# Patient Record
Sex: FEMALE | Race: WHITE | Employment: OTHER
[De-identification: names, ages, dates, MRNs, and addresses within clinical notes are randomized per-mention and may not be internally consistent; named-entity substitution may affect disease eponyms.]

---

## 2017-05-16 ENCOUNTER — TELEPHONE (OUTPATIENT)
Dept: OTHER | Facility: CLINIC | Age: 82
End: 2017-05-16

## 2020-01-01 ENCOUNTER — HOSPITAL ENCOUNTER (OUTPATIENT)
Age: 85
Discharge: HOME | End: 2020-07-18
Payer: MEDICARE

## 2020-01-01 ENCOUNTER — HOSPITAL ENCOUNTER (OUTPATIENT)
Age: 85
Discharge: HOME | End: 2020-09-16

## 2020-01-01 ENCOUNTER — HOSPITAL ENCOUNTER (OUTPATIENT)
Age: 85
Discharge: HOME | End: 2020-10-29
Payer: MEDICARE

## 2020-01-01 ENCOUNTER — APPOINTMENT (OUTPATIENT)
Dept: GENERAL RADIOLOGY | Age: 85
DRG: 872 | End: 2020-01-01
Payer: MEDICARE

## 2020-01-01 ENCOUNTER — HOSPITAL ENCOUNTER (OUTPATIENT)
Age: 85
Discharge: HOME | End: 2020-10-18
Payer: MEDICARE

## 2020-01-01 ENCOUNTER — HOSPITAL ENCOUNTER (OUTPATIENT)
Age: 85
Discharge: HOME | End: 2020-07-16
Payer: MEDICARE

## 2020-01-01 ENCOUNTER — HOSPITAL ENCOUNTER (OUTPATIENT)
Age: 85
Discharge: HOME | End: 2020-10-08
Payer: MEDICARE

## 2020-01-01 ENCOUNTER — HOSPITAL ENCOUNTER (INPATIENT)
Age: 85
LOS: 10 days | Discharge: OTHER INSTITUTION | DRG: 641 | End: 2020-08-28
Attending: EMERGENCY MEDICINE | Admitting: INTERNAL MEDICINE
Payer: MEDICARE

## 2020-01-01 ENCOUNTER — HOSPITAL ENCOUNTER (OUTPATIENT)
Age: 85
Discharge: HOME | End: 2020-09-06

## 2020-01-01 ENCOUNTER — HOSPITAL ENCOUNTER (OUTPATIENT)
Age: 85
Discharge: HOME | End: 2020-10-04
Payer: MEDICARE

## 2020-01-01 ENCOUNTER — HOSPITAL ENCOUNTER (OUTPATIENT)
Age: 85
Discharge: HOME | End: 2020-09-10

## 2020-01-01 ENCOUNTER — HOSPITAL ENCOUNTER (OUTPATIENT)
Age: 85
Discharge: HOME | End: 2020-08-13
Payer: MEDICARE

## 2020-01-01 ENCOUNTER — APPOINTMENT (OUTPATIENT)
Dept: CT IMAGING | Age: 85
DRG: 872 | End: 2020-01-01
Payer: MEDICARE

## 2020-01-01 ENCOUNTER — HOSPITAL ENCOUNTER (OUTPATIENT)
Age: 85
Discharge: HOME | End: 2020-11-01
Payer: MEDICARE

## 2020-01-01 ENCOUNTER — HOSPITAL ENCOUNTER (INPATIENT)
Age: 85
LOS: 3 days | Discharge: NURSING, REHAB OR LONG-TERM CARE | DRG: 872 | End: 2020-12-31
Attending: EMERGENCY MEDICINE | Admitting: INTERNAL MEDICINE
Payer: MEDICARE

## 2020-01-01 ENCOUNTER — HOSPITAL ENCOUNTER (OUTPATIENT)
Age: 85
Discharge: HOME | End: 2020-10-11
Payer: MEDICARE

## 2020-01-01 ENCOUNTER — HOSPITAL ENCOUNTER (OUTPATIENT)
Age: 85
Discharge: HOME | End: 2020-09-25
Payer: MEDICARE

## 2020-01-01 ENCOUNTER — ANESTHESIA EVENT (OUTPATIENT)
Dept: ENDOSCOPY | Age: 85
DRG: 641 | End: 2020-01-01
Payer: MEDICARE

## 2020-01-01 ENCOUNTER — APPOINTMENT (OUTPATIENT)
Dept: CT IMAGING | Age: 85
DRG: 641 | End: 2020-01-01
Payer: MEDICARE

## 2020-01-01 ENCOUNTER — HOSPITAL ENCOUNTER (OUTPATIENT)
Age: 85
Discharge: HOME | End: 2020-08-20
Payer: MEDICARE

## 2020-01-01 ENCOUNTER — ANESTHESIA (OUTPATIENT)
Dept: ENDOSCOPY | Age: 85
DRG: 641 | End: 2020-01-01
Payer: MEDICARE

## 2020-01-01 ENCOUNTER — HOSPITAL ENCOUNTER (OUTPATIENT)
Age: 85
Discharge: HOME | End: 2020-09-03

## 2020-01-01 VITALS
OXYGEN SATURATION: 99 % | RESPIRATION RATE: 5 BRPM | DIASTOLIC BLOOD PRESSURE: 52 MMHG | SYSTOLIC BLOOD PRESSURE: 89 MMHG

## 2020-01-01 VITALS
HEIGHT: 63 IN | WEIGHT: 172 LBS | RESPIRATION RATE: 18 BRPM | TEMPERATURE: 97.5 F | BODY MASS INDEX: 30.48 KG/M2 | SYSTOLIC BLOOD PRESSURE: 110 MMHG | OXYGEN SATURATION: 94 % | DIASTOLIC BLOOD PRESSURE: 56 MMHG | HEART RATE: 95 BPM

## 2020-01-01 VITALS
OXYGEN SATURATION: 95 % | RESPIRATION RATE: 16 BRPM | BODY MASS INDEX: 30.65 KG/M2 | SYSTOLIC BLOOD PRESSURE: 110 MMHG | DIASTOLIC BLOOD PRESSURE: 60 MMHG | WEIGHT: 173 LBS | HEIGHT: 63 IN | HEART RATE: 76 BPM | TEMPERATURE: 98.6 F

## 2020-01-01 DIAGNOSIS — R41.82 ALTERED MENTAL STATUS, UNSPECIFIED ALTERED MENTAL STATUS TYPE: ICD-10-CM

## 2020-01-01 DIAGNOSIS — N30.00 ACUTE CYSTITIS WITHOUT HEMATURIA: Primary | ICD-10-CM

## 2020-01-01 LAB
ALBUMIN SERPL-MCNC: 2.6 G/DL (ref 3.5–5.2)
ALBUMIN SERPL-MCNC: 2.7 G/DL (ref 3.5–5.2)
ALBUMIN SERPL-MCNC: 2.7 G/DL (ref 3.5–5.2)
ALBUMIN SERPL-MCNC: 2.8 G/DL (ref 3.5–5.2)
ALBUMIN SERPL-MCNC: 2.9 G/DL (ref 3.5–5.2)
ALBUMIN SERPL-MCNC: 2.9 G/DL (ref 3.5–5.2)
ALBUMIN SERPL-MCNC: 3 G/DL (ref 3.5–5.2)
ALBUMIN SERPL-MCNC: 3 G/DL (ref 3.5–5.2)
ALBUMIN SERPL-MCNC: 3.1 G/DL (ref 3.5–5.2)
ALBUMIN SERPL-MCNC: 3.3 G/DL (ref 3.5–5.2)
ALBUMIN SERPL-MCNC: 3.5 G/DL (ref 3.5–5.2)
ALBUMIN SERPL-MCNC: 3.8 G/DL (ref 3.5–5.2)
ALBUMIN SERPL-MCNC: 3.9 G/DL (ref 3.5–5.2)
ALBUMIN SERPL-MCNC: 4 G/DL (ref 3.5–5.2)
ALP BLD-CCNC: 100 U/L (ref 35–104)
ALP BLD-CCNC: 101 U/L (ref 35–104)
ALP BLD-CCNC: 101 U/L (ref 35–104)
ALP BLD-CCNC: 102 U/L (ref 35–104)
ALP BLD-CCNC: 106 U/L (ref 35–104)
ALP BLD-CCNC: 111 U/L (ref 35–104)
ALP BLD-CCNC: 87 U/L (ref 35–104)
ALP BLD-CCNC: 91 U/L (ref 35–104)
ALP BLD-CCNC: 91 U/L (ref 35–104)
ALP BLD-CCNC: 92 U/L (ref 35–104)
ALP BLD-CCNC: 92 U/L (ref 35–104)
ALP BLD-CCNC: 93 U/L (ref 35–104)
ALP BLD-CCNC: 98 U/L (ref 35–104)
ALP BLD-CCNC: 98 U/L (ref 35–104)
ALT SERPL-CCNC: 10 U/L (ref 0–32)
ALT SERPL-CCNC: 11 U/L (ref 0–32)
ALT SERPL-CCNC: 12 U/L (ref 0–32)
ALT SERPL-CCNC: 12 U/L (ref 0–32)
ALT SERPL-CCNC: 13 U/L (ref 0–32)
ALT SERPL-CCNC: 14 U/L (ref 0–32)
ALT SERPL-CCNC: 14 U/L (ref 0–32)
ALT SERPL-CCNC: 16 U/L (ref 0–32)
ALT SERPL-CCNC: 16 U/L (ref 0–32)
ALT SERPL-CCNC: 18 U/L (ref 0–32)
ALT SERPL-CCNC: 19 U/L (ref 0–32)
ALT SERPL-CCNC: 37 U/L (ref 0–32)
ANION GAP SERPL CALCULATED.3IONS-SCNC: 10 MMOL/L (ref 7–16)
ANION GAP SERPL CALCULATED.3IONS-SCNC: 12 MMOL/L (ref 7–16)
ANION GAP SERPL CALCULATED.3IONS-SCNC: 13 MMOL/L (ref 7–16)
ANION GAP SERPL CALCULATED.3IONS-SCNC: 14 MMOL/L (ref 7–16)
ANION GAP SERPL CALCULATED.3IONS-SCNC: 14 MMOL/L (ref 7–16)
ANION GAP SERPL CALCULATED.3IONS-SCNC: 15 MMOL/L (ref 7–16)
ANION GAP SERPL CALCULATED.3IONS-SCNC: 16 MMOL/L (ref 7–16)
ANION GAP SERPL CALCULATED.3IONS-SCNC: 17 MMOL/L (ref 7–16)
ANION GAP SERPL CALCULATED.3IONS-SCNC: 18 MMOL/L (ref 7–16)
ANION GAP SERPL CALCULATED.3IONS-SCNC: 20 MMOL/L (ref 7–16)
ANION GAP SERPL CALCULATED.3IONS-SCNC: 20 MMOL/L (ref 7–16)
ANION GAP SERPL CALCULATED.3IONS-SCNC: 21 MMOL/L (ref 7–16)
ANION GAP SERPL CALCULATED.3IONS-SCNC: 22 MMOL/L (ref 7–16)
ANION GAP SERPL CALCULATED.3IONS-SCNC: 24 MMOL/L (ref 7–16)
ANION GAP SERPL CALCULATED.3IONS-SCNC: 24 MMOL/L (ref 7–16)
ANISOCYTOSIS: ABNORMAL
AST SERPL-CCNC: 15 U/L (ref 0–31)
AST SERPL-CCNC: 16 U/L (ref 0–31)
AST SERPL-CCNC: 17 U/L (ref 0–31)
AST SERPL-CCNC: 19 U/L (ref 0–31)
AST SERPL-CCNC: 19 U/L (ref 0–31)
AST SERPL-CCNC: 22 U/L (ref 0–31)
AST SERPL-CCNC: 26 U/L (ref 0–31)
AST SERPL-CCNC: 27 U/L (ref 0–31)
AST SERPL-CCNC: 27 U/L (ref 0–31)
AST SERPL-CCNC: 29 U/L (ref 0–31)
AST SERPL-CCNC: 30 U/L (ref 0–31)
AST SERPL-CCNC: 31 U/L (ref 0–31)
AST SERPL-CCNC: 36 U/L (ref 0–31)
AST SERPL-CCNC: 37 U/L (ref 0–31)
B.E.: -7.8 MMOL/L (ref -3–3)
BACTERIA: ABNORMAL /HPF
BASOPHILS ABSOLUTE: 0 E9/L (ref 0–0.2)
BASOPHILS ABSOLUTE: 0.02 E9/L (ref 0–0.2)
BASOPHILS ABSOLUTE: 0.03 E9/L (ref 0–0.2)
BASOPHILS ABSOLUTE: 0.04 E9/L (ref 0–0.2)
BASOPHILS ABSOLUTE: 0.05 E9/L (ref 0–0.2)
BASOPHILS RELATIVE PERCENT: 0.2 % (ref 0–2)
BASOPHILS RELATIVE PERCENT: 0.2 % (ref 0–2)
BASOPHILS RELATIVE PERCENT: 0.3 % (ref 0–2)
BASOPHILS RELATIVE PERCENT: 0.3 % (ref 0–2)
BASOPHILS RELATIVE PERCENT: 0.4 % (ref 0–2)
BASOPHILS RELATIVE PERCENT: 0.5 % (ref 0–2)
BASOPHILS RELATIVE PERCENT: 0.6 % (ref 0–2)
BASOPHILS RELATIVE PERCENT: 0.6 % (ref 0–2)
BASOPHILS RELATIVE PERCENT: 0.7 % (ref 0–2)
BASOPHILS RELATIVE PERCENT: 0.7 % (ref 0–2)
BASOPHILS RELATIVE PERCENT: 0.8 % (ref 0–2)
BASOPHILS RELATIVE PERCENT: 0.8 % (ref 0–2)
BILIRUB SERPL-MCNC: 0.3 MG/DL (ref 0–1.2)
BILIRUB SERPL-MCNC: 0.3 MG/DL (ref 0–1.2)
BILIRUB SERPL-MCNC: 0.4 MG/DL (ref 0–1.2)
BILIRUB SERPL-MCNC: 0.5 MG/DL (ref 0–1.2)
BILIRUB SERPL-MCNC: 0.6 MG/DL (ref 0–1.2)
BILIRUBIN DIRECT: 0.2 MG/DL (ref 0–0.3)
BILIRUBIN DIRECT: <0.2 MG/DL (ref 0–0.3)
BILIRUBIN URINE: NEGATIVE
BILIRUBIN, INDIRECT: 0.3 MG/DL (ref 0–1)
BILIRUBIN, INDIRECT: ABNORMAL MG/DL (ref 0–1)
BLOOD CULTURE, ROUTINE: NORMAL
BLOOD, URINE: ABNORMAL
BLOOD, URINE: ABNORMAL
BLOOD, URINE: NEGATIVE
BLOOD, URINE: NEGATIVE
BUN BLDV-MCNC: 102 MG/DL (ref 8–23)
BUN BLDV-MCNC: 102 MG/DL (ref 8–23)
BUN BLDV-MCNC: 118 MG/DL (ref 8–23)
BUN BLDV-MCNC: 125 MG/DL (ref 8–23)
BUN BLDV-MCNC: 129 MG/DL (ref 8–23)
BUN BLDV-MCNC: 130 MG/DL (ref 8–23)
BUN BLDV-MCNC: 132 MG/DL (ref 8–23)
BUN BLDV-MCNC: 19 MG/DL (ref 8–23)
BUN BLDV-MCNC: 31 MG/DL (ref 8–23)
BUN BLDV-MCNC: 37 MG/DL (ref 8–23)
BUN BLDV-MCNC: 40 MG/DL (ref 8–23)
BUN BLDV-MCNC: 40 MG/DL (ref 8–23)
BUN BLDV-MCNC: 45 MG/DL (ref 8–23)
BUN BLDV-MCNC: 51 MG/DL (ref 8–23)
BUN BLDV-MCNC: 51 MG/DL (ref 8–23)
BUN BLDV-MCNC: 52 MG/DL (ref 8–23)
BUN BLDV-MCNC: 58 MG/DL (ref 8–23)
BUN BLDV-MCNC: 63 MG/DL (ref 8–23)
BUN BLDV-MCNC: 66 MG/DL (ref 8–23)
BUN BLDV-MCNC: 69 MG/DL (ref 8–23)
BUN BLDV-MCNC: 73 MG/DL (ref 8–23)
BUN BLDV-MCNC: 78 MG/DL (ref 8–23)
BUN BLDV-MCNC: 88 MG/DL (ref 8–23)
BUN BLDV-MCNC: 92 MG/DL (ref 8–23)
C-REACTIVE PROTEIN: 1.2 MG/DL (ref 0–0.4)
C-REACTIVE PROTEIN: 1.3 MG/DL (ref 0–0.4)
C-REACTIVE PROTEIN: 1.4 MG/DL (ref 0–0.4)
C-REACTIVE PROTEIN: 1.7 MG/DL (ref 0–0.4)
C-REACTIVE PROTEIN: 1.7 MG/DL (ref 0–0.4)
C-REACTIVE PROTEIN: 4.5 MG/DL (ref 0–0.4)
C-REACTIVE PROTEIN: 5 MG/DL (ref 0–0.4)
C-REACTIVE PROTEIN: 6 MG/DL (ref 0–0.4)
CALCIUM SERPL-MCNC: 10.1 MG/DL (ref 8.6–10.2)
CALCIUM SERPL-MCNC: 10.2 MG/DL (ref 8.6–10.2)
CALCIUM SERPL-MCNC: 10.5 MG/DL (ref 8.6–10.2)
CALCIUM SERPL-MCNC: 10.8 MG/DL (ref 8.6–10.2)
CALCIUM SERPL-MCNC: 10.8 MG/DL (ref 8.6–10.2)
CALCIUM SERPL-MCNC: 11 MG/DL (ref 8.6–10.2)
CALCIUM SERPL-MCNC: 8.2 MG/DL (ref 8.6–10.2)
CALCIUM SERPL-MCNC: 8.3 MG/DL (ref 8.6–10.2)
CALCIUM SERPL-MCNC: 8.3 MG/DL (ref 8.6–10.2)
CALCIUM SERPL-MCNC: 8.4 MG/DL (ref 8.6–10.2)
CALCIUM SERPL-MCNC: 8.5 MG/DL (ref 8.6–10.2)
CALCIUM SERPL-MCNC: 8.5 MG/DL (ref 8.6–10.2)
CALCIUM SERPL-MCNC: 8.6 MG/DL (ref 8.6–10.2)
CALCIUM SERPL-MCNC: 8.6 MG/DL (ref 8.6–10.2)
CALCIUM SERPL-MCNC: 8.7 MG/DL (ref 8.6–10.2)
CALCIUM SERPL-MCNC: 8.9 MG/DL (ref 8.6–10.2)
CALCIUM SERPL-MCNC: 9.3 MG/DL (ref 8.6–10.2)
CALCIUM SERPL-MCNC: 9.4 MG/DL (ref 8.6–10.2)
CALCIUM SERPL-MCNC: 9.5 MG/DL (ref 8.6–10.2)
CALCIUM SERPL-MCNC: 9.8 MG/DL (ref 8.6–10.2)
CALCIUM SERPL-MCNC: 9.9 MG/DL (ref 8.6–10.2)
CALCIUM SERPL-MCNC: 9.9 MG/DL (ref 8.6–10.2)
CHLORIDE BLD-SCNC: 100 MMOL/L (ref 98–107)
CHLORIDE BLD-SCNC: 101 MMOL/L (ref 98–107)
CHLORIDE BLD-SCNC: 102 MMOL/L (ref 98–107)
CHLORIDE BLD-SCNC: 103 MMOL/L (ref 98–107)
CHLORIDE BLD-SCNC: 103 MMOL/L (ref 98–107)
CHLORIDE BLD-SCNC: 104 MMOL/L (ref 98–107)
CHLORIDE BLD-SCNC: 105 MMOL/L (ref 98–107)
CHLORIDE BLD-SCNC: 106 MMOL/L (ref 98–107)
CHLORIDE BLD-SCNC: 106 MMOL/L (ref 98–107)
CHLORIDE BLD-SCNC: 110 MMOL/L (ref 98–107)
CHLORIDE BLD-SCNC: 86 MMOL/L (ref 98–107)
CHLORIDE BLD-SCNC: 88 MMOL/L (ref 98–107)
CHLORIDE BLD-SCNC: 89 MMOL/L (ref 98–107)
CHLORIDE BLD-SCNC: 89 MMOL/L (ref 98–107)
CHLORIDE BLD-SCNC: 90 MMOL/L (ref 98–107)
CHLORIDE BLD-SCNC: 94 MMOL/L (ref 98–107)
CHLORIDE BLD-SCNC: 95 MMOL/L (ref 98–107)
CHLORIDE BLD-SCNC: 97 MMOL/L (ref 98–107)
CHLORIDE BLD-SCNC: 97 MMOL/L (ref 98–107)
CHLORIDE BLD-SCNC: 98 MMOL/L (ref 98–107)
CHLORIDE BLD-SCNC: 98 MMOL/L (ref 98–107)
CHLORIDE BLD-SCNC: 99 MMOL/L (ref 98–107)
CHLORIDE URINE RANDOM: 55 MMOL/L
CHOLESTEROL, TOTAL: 186 MG/DL (ref 0–199)
CLARITY: ABNORMAL
CLARITY: CLEAR
CO2: 14 MMOL/L (ref 22–29)
CO2: 15 MMOL/L (ref 22–29)
CO2: 17 MMOL/L (ref 22–29)
CO2: 18 MMOL/L (ref 22–29)
CO2: 19 MMOL/L (ref 22–29)
CO2: 20 MMOL/L (ref 22–29)
CO2: 21 MMOL/L (ref 22–29)
CO2: 21 MMOL/L (ref 22–29)
CO2: 23 MMOL/L (ref 22–29)
CO2: 23 MMOL/L (ref 22–29)
CO2: 25 MMOL/L (ref 22–29)
CO2: 27 MMOL/L (ref 22–29)
CO2: 27 MMOL/L (ref 22–29)
CO2: 28 MMOL/L (ref 22–29)
CO2: 30 MMOL/L (ref 22–29)
CO2: 30 MMOL/L (ref 22–29)
CO2: 32 MMOL/L (ref 22–29)
COHB: 0.9 % (ref 0–1.5)
COLOR: YELLOW
CREAT SERPL-MCNC: 0.7 MG/DL (ref 0.5–1)
CREAT SERPL-MCNC: 0.8 MG/DL (ref 0.5–1)
CREAT SERPL-MCNC: 0.8 MG/DL (ref 0.5–1)
CREAT SERPL-MCNC: 0.9 MG/DL (ref 0.5–1)
CREAT SERPL-MCNC: 0.9 MG/DL (ref 0.5–1)
CREAT SERPL-MCNC: 1.1 MG/DL (ref 0.5–1)
CREAT SERPL-MCNC: 1.3 MG/DL (ref 0.5–1)
CREAT SERPL-MCNC: 1.4 MG/DL (ref 0.5–1)
CREAT SERPL-MCNC: 1.6 MG/DL (ref 0.5–1)
CREAT SERPL-MCNC: 1.8 MG/DL (ref 0.5–1)
CREAT SERPL-MCNC: 2.1 MG/DL (ref 0.5–1)
CREAT SERPL-MCNC: 2.3 MG/DL (ref 0.5–1)
CREAT SERPL-MCNC: 2.7 MG/DL (ref 0.5–1)
CREAT SERPL-MCNC: 2.9 MG/DL (ref 0.5–1)
CREAT SERPL-MCNC: 3.6 MG/DL (ref 0.5–1)
CREAT SERPL-MCNC: 3.9 MG/DL (ref 0.5–1)
CREAT SERPL-MCNC: 4.5 MG/DL (ref 0.5–1)
CREAT SERPL-MCNC: 5.2 MG/DL (ref 0.5–1)
CREAT SERPL-MCNC: 5.6 MG/DL (ref 0.5–1)
CREAT SERPL-MCNC: 6.5 MG/DL (ref 0.5–1)
CREAT SERPL-MCNC: 6.5 MG/DL (ref 0.5–1)
CREAT SERPL-MCNC: 7 MG/DL (ref 0.5–1)
CREAT SERPL-MCNC: 7.1 MG/DL (ref 0.5–1)
CREAT SERPL-MCNC: 7.1 MG/DL (ref 0.5–1)
CREATININE URINE: 65 MG/DL (ref 29–226)
CRITICAL: ABNORMAL
CRYSTALS, UA: ABNORMAL /HPF
CRYSTALS, UA: ABNORMAL /HPF
CULTURE, BLOOD 2: ABNORMAL
DATE ANALYZED: ABNORMAL
DATE OF COLLECTION: ABNORMAL
EKG ATRIAL RATE: 104 BPM
EKG P AXIS: 42 DEGREES
EKG P-R INTERVAL: 182 MS
EKG Q-T INTERVAL: 362 MS
EKG QRS DURATION: 84 MS
EKG QTC CALCULATION (BAZETT): 476 MS
EKG R AXIS: -18 DEGREES
EKG T AXIS: 59 DEGREES
EKG VENTRICULAR RATE: 104 BPM
EOSINOPHILS ABSOLUTE: 0 E9/L (ref 0.05–0.5)
EOSINOPHILS ABSOLUTE: 0.02 E9/L (ref 0.05–0.5)
EOSINOPHILS ABSOLUTE: 0.03 E9/L (ref 0.05–0.5)
EOSINOPHILS ABSOLUTE: 0.04 E9/L (ref 0.05–0.5)
EOSINOPHILS ABSOLUTE: 0.06 E9/L (ref 0.05–0.5)
EOSINOPHILS ABSOLUTE: 0.06 E9/L (ref 0.05–0.5)
EOSINOPHILS ABSOLUTE: 0.07 E9/L (ref 0.05–0.5)
EOSINOPHILS ABSOLUTE: 0.08 E9/L (ref 0.05–0.5)
EOSINOPHILS ABSOLUTE: 0.09 E9/L (ref 0.05–0.5)
EOSINOPHILS ABSOLUTE: 0.09 E9/L (ref 0.05–0.5)
EOSINOPHILS ABSOLUTE: 0.1 E9/L (ref 0.05–0.5)
EOSINOPHILS ABSOLUTE: 0.11 E9/L (ref 0.05–0.5)
EOSINOPHILS ABSOLUTE: 0.13 E9/L (ref 0.05–0.5)
EOSINOPHILS ABSOLUTE: 0.17 E9/L (ref 0.05–0.5)
EOSINOPHILS ABSOLUTE: 0.22 E9/L (ref 0.05–0.5)
EOSINOPHILS RELATIVE PERCENT: 0 % (ref 0–6)
EOSINOPHILS RELATIVE PERCENT: 0.2 % (ref 0–6)
EOSINOPHILS RELATIVE PERCENT: 0.4 % (ref 0–6)
EOSINOPHILS RELATIVE PERCENT: 0.4 % (ref 0–6)
EOSINOPHILS RELATIVE PERCENT: 0.7 % (ref 0–6)
EOSINOPHILS RELATIVE PERCENT: 0.9 % (ref 0–6)
EOSINOPHILS RELATIVE PERCENT: 0.9 % (ref 0–6)
EOSINOPHILS RELATIVE PERCENT: 1.1 % (ref 0–6)
EOSINOPHILS RELATIVE PERCENT: 1.3 % (ref 0–6)
EOSINOPHILS RELATIVE PERCENT: 1.4 % (ref 0–6)
EOSINOPHILS RELATIVE PERCENT: 1.4 % (ref 0–6)
EOSINOPHILS RELATIVE PERCENT: 1.7 % (ref 0–6)
EOSINOPHILS RELATIVE PERCENT: 2.1 % (ref 0–6)
EOSINOPHILS RELATIVE PERCENT: 2.2 % (ref 0–6)
EOSINOPHILS RELATIVE PERCENT: 3.4 % (ref 0–6)
EPITHELIAL CELLS, UA: ABNORMAL /HPF
FERRITIN: 1156 NG/ML
FOLATE: >20 NG/ML (ref 4.8–24.2)
GFR AFRICAN AMERICAN: 11
GFR AFRICAN AMERICAN: 13
GFR AFRICAN AMERICAN: 14
GFR AFRICAN AMERICAN: 19
GFR AFRICAN AMERICAN: 20
GFR AFRICAN AMERICAN: 24
GFR AFRICAN AMERICAN: 27
GFR AFRICAN AMERICAN: 32
GFR AFRICAN AMERICAN: 37
GFR AFRICAN AMERICAN: 43
GFR AFRICAN AMERICAN: 47
GFR AFRICAN AMERICAN: 57
GFR AFRICAN AMERICAN: 7
GFR AFRICAN AMERICAN: 9
GFR AFRICAN AMERICAN: 9
GFR AFRICAN AMERICAN: >60
GFR NON-AFRICAN AMERICAN: 11 ML/MIN/1.73
GFR NON-AFRICAN AMERICAN: 12 ML/MIN/1.73
GFR NON-AFRICAN AMERICAN: 15 ML/MIN/1.73
GFR NON-AFRICAN AMERICAN: 17 ML/MIN/1.73
GFR NON-AFRICAN AMERICAN: 20 ML/MIN/1.73
GFR NON-AFRICAN AMERICAN: 22 ML/MIN/1.73
GFR NON-AFRICAN AMERICAN: 27 ML/MIN/1.73
GFR NON-AFRICAN AMERICAN: 30 ML/MIN/1.73
GFR NON-AFRICAN AMERICAN: 36 ML/MIN/1.73
GFR NON-AFRICAN AMERICAN: 39 ML/MIN/1.73
GFR NON-AFRICAN AMERICAN: 47 ML/MIN/1.73
GFR NON-AFRICAN AMERICAN: 5 ML/MIN/1.73
GFR NON-AFRICAN AMERICAN: 5 ML/MIN/1.73
GFR NON-AFRICAN AMERICAN: 59 ML/MIN/1.73
GFR NON-AFRICAN AMERICAN: 59 ML/MIN/1.73
GFR NON-AFRICAN AMERICAN: 6 ML/MIN/1.73
GFR NON-AFRICAN AMERICAN: 7 ML/MIN/1.73
GFR NON-AFRICAN AMERICAN: 8 ML/MIN/1.73
GFR NON-AFRICAN AMERICAN: 9 ML/MIN/1.73
GFR NON-AFRICAN AMERICAN: >60 ML/MIN/1.73
GLUCOSE BLD-MCNC: 100 MG/DL (ref 74–99)
GLUCOSE BLD-MCNC: 101 MG/DL (ref 74–99)
GLUCOSE BLD-MCNC: 109 MG/DL (ref 74–99)
GLUCOSE BLD-MCNC: 109 MG/DL (ref 74–99)
GLUCOSE BLD-MCNC: 113 MG/DL (ref 74–99)
GLUCOSE BLD-MCNC: 121 MG/DL (ref 74–99)
GLUCOSE BLD-MCNC: 128 MG/DL (ref 74–99)
GLUCOSE BLD-MCNC: 131 MG/DL (ref 74–99)
GLUCOSE BLD-MCNC: 133 MG/DL (ref 74–99)
GLUCOSE BLD-MCNC: 135 MG/DL (ref 74–99)
GLUCOSE BLD-MCNC: 137 MG/DL (ref 74–99)
GLUCOSE BLD-MCNC: 139 MG/DL (ref 74–99)
GLUCOSE BLD-MCNC: 141 MG/DL (ref 74–99)
GLUCOSE BLD-MCNC: 143 MG/DL (ref 74–99)
GLUCOSE BLD-MCNC: 228 MG/DL (ref 74–99)
GLUCOSE BLD-MCNC: 84 MG/DL (ref 74–99)
GLUCOSE BLD-MCNC: 85 MG/DL (ref 74–99)
GLUCOSE BLD-MCNC: 86 MG/DL (ref 74–99)
GLUCOSE BLD-MCNC: 94 MG/DL (ref 74–99)
GLUCOSE BLD-MCNC: 94 MG/DL (ref 74–99)
GLUCOSE BLD-MCNC: 96 MG/DL (ref 74–99)
GLUCOSE BLD-MCNC: 99 MG/DL (ref 74–99)
GLUCOSE URINE: NEGATIVE MG/DL
HBA1C MFR BLD: 5.8 % (ref 4–5.6)
HCO3: 15.5 MMOL/L (ref 22–26)
HCT VFR BLD CALC: 29.3 % (ref 34–48)
HCT VFR BLD CALC: 30 % (ref 34–48)
HCT VFR BLD CALC: 31.2 % (ref 34–48)
HCT VFR BLD CALC: 31.4 % (ref 34–48)
HCT VFR BLD CALC: 31.4 % (ref 34–48)
HCT VFR BLD CALC: 32.5 % (ref 34–48)
HCT VFR BLD CALC: 33.8 % (ref 34–48)
HCT VFR BLD CALC: 34.4 % (ref 34–48)
HCT VFR BLD CALC: 34.6 % (ref 34–48)
HCT VFR BLD CALC: 34.6 % (ref 34–48)
HCT VFR BLD CALC: 34.7 % (ref 34–48)
HCT VFR BLD CALC: 34.8 % (ref 34–48)
HCT VFR BLD CALC: 35.8 % (ref 34–48)
HCT VFR BLD CALC: 36.2 % (ref 34–48)
HCT VFR BLD CALC: 36.8 % (ref 34–48)
HCT VFR BLD CALC: 38.8 % (ref 34–48)
HCT VFR BLD CALC: 39.1 % (ref 34–48)
HDLC SERPL-MCNC: 27 MG/DL
HEMOGLOBIN: 10.2 G/DL (ref 11.5–15.5)
HEMOGLOBIN: 10.2 G/DL (ref 11.5–15.5)
HEMOGLOBIN: 10.5 G/DL (ref 11.5–15.5)
HEMOGLOBIN: 11 G/DL (ref 11.5–15.5)
HEMOGLOBIN: 11 G/DL (ref 11.5–15.5)
HEMOGLOBIN: 11.1 G/DL (ref 11.5–15.5)
HEMOGLOBIN: 11.1 G/DL (ref 11.5–15.5)
HEMOGLOBIN: 11.2 G/DL (ref 11.5–15.5)
HEMOGLOBIN: 11.5 G/DL (ref 11.5–15.5)
HEMOGLOBIN: 11.6 G/DL (ref 11.5–15.5)
HEMOGLOBIN: 11.7 G/DL (ref 11.5–15.5)
HEMOGLOBIN: 12.4 G/DL (ref 11.5–15.5)
HEMOGLOBIN: 12.7 G/DL (ref 11.5–15.5)
HEMOGLOBIN: 9.8 G/DL (ref 11.5–15.5)
HEMOGLOBIN: 9.9 G/DL (ref 11.5–15.5)
HHB: 4.5 % (ref 0–5)
IMMATURE GRANULOCYTES #: 0.01 E9/L
IMMATURE GRANULOCYTES #: 0.02 E9/L
IMMATURE GRANULOCYTES #: 0.03 E9/L
IMMATURE GRANULOCYTES #: 0.04 E9/L
IMMATURE GRANULOCYTES #: 0.05 E9/L
IMMATURE GRANULOCYTES #: 0.07 E9/L
IMMATURE GRANULOCYTES #: 0.07 E9/L
IMMATURE GRANULOCYTES %: 0.1 % (ref 0–5)
IMMATURE GRANULOCYTES %: 0.2 % (ref 0–5)
IMMATURE GRANULOCYTES %: 0.3 % (ref 0–5)
IMMATURE GRANULOCYTES %: 0.4 % (ref 0–5)
IMMATURE GRANULOCYTES %: 0.5 % (ref 0–5)
IMMATURE GRANULOCYTES %: 0.6 % (ref 0–5)
IRON SATURATION: 41 % (ref 15–50)
IRON: 112 MCG/DL (ref 37–145)
KETONES, URINE: NEGATIVE MG/DL
LAB: ABNORMAL
LACTIC ACID, SEPSIS: 0.7 MMOL/L (ref 0.5–1.9)
LACTIC ACID, SEPSIS: 0.7 MMOL/L (ref 0.5–1.9)
LACTIC ACID, SEPSIS: 0.9 MMOL/L (ref 0.5–1.9)
LACTIC ACID, SEPSIS: 0.9 MMOL/L (ref 0.5–1.9)
LACTIC ACID, SEPSIS: 1 MMOL/L (ref 0.5–1.9)
LACTIC ACID, SEPSIS: 1 MMOL/L (ref 0.5–1.9)
LACTIC ACID, SEPSIS: 1.1 MMOL/L (ref 0.5–1.9)
LACTIC ACID, SEPSIS: 1.4 MMOL/L (ref 0.5–1.9)
LACTIC ACID, SEPSIS: 1.4 MMOL/L (ref 0.5–1.9)
LACTIC ACID, SEPSIS: 2 MMOL/L (ref 0.5–1.9)
LACTIC ACID, SEPSIS: 2.4 MMOL/L (ref 0.5–1.9)
LDL CHOLESTEROL CALCULATED: 123 MG/DL (ref 0–99)
LEUKOCYTE ESTERASE, URINE: ABNORMAL
LV EF: 65 %
LVEF MODALITY: NORMAL
LYMPHOCYTES ABSOLUTE: 1.84 E9/L (ref 1.5–4)
LYMPHOCYTES ABSOLUTE: 2.01 E9/L (ref 1.5–4)
LYMPHOCYTES ABSOLUTE: 2.26 E9/L (ref 1.5–4)
LYMPHOCYTES ABSOLUTE: 2.27 E9/L (ref 1.5–4)
LYMPHOCYTES ABSOLUTE: 2.39 E9/L (ref 1.5–4)
LYMPHOCYTES ABSOLUTE: 2.41 E9/L (ref 1.5–4)
LYMPHOCYTES ABSOLUTE: 2.49 E9/L (ref 1.5–4)
LYMPHOCYTES ABSOLUTE: 2.59 E9/L (ref 1.5–4)
LYMPHOCYTES ABSOLUTE: 2.64 E9/L (ref 1.5–4)
LYMPHOCYTES ABSOLUTE: 2.75 E9/L (ref 1.5–4)
LYMPHOCYTES ABSOLUTE: 2.8 E9/L (ref 1.5–4)
LYMPHOCYTES ABSOLUTE: 2.82 E9/L (ref 1.5–4)
LYMPHOCYTES ABSOLUTE: 2.88 E9/L (ref 1.5–4)
LYMPHOCYTES ABSOLUTE: 2.91 E9/L (ref 1.5–4)
LYMPHOCYTES ABSOLUTE: 3.09 E9/L (ref 1.5–4)
LYMPHOCYTES ABSOLUTE: 3.1 E9/L (ref 1.5–4)
LYMPHOCYTES ABSOLUTE: 3.45 E9/L (ref 1.5–4)
LYMPHOCYTES RELATIVE PERCENT: 19.8 % (ref 20–42)
LYMPHOCYTES RELATIVE PERCENT: 25.2 % (ref 20–42)
LYMPHOCYTES RELATIVE PERCENT: 28.7 % (ref 20–42)
LYMPHOCYTES RELATIVE PERCENT: 28.7 % (ref 20–42)
LYMPHOCYTES RELATIVE PERCENT: 30.6 % (ref 20–42)
LYMPHOCYTES RELATIVE PERCENT: 31.4 % (ref 20–42)
LYMPHOCYTES RELATIVE PERCENT: 32.5 % (ref 20–42)
LYMPHOCYTES RELATIVE PERCENT: 33.3 % (ref 20–42)
LYMPHOCYTES RELATIVE PERCENT: 34.8 % (ref 20–42)
LYMPHOCYTES RELATIVE PERCENT: 35.1 % (ref 20–42)
LYMPHOCYTES RELATIVE PERCENT: 37 % (ref 20–42)
LYMPHOCYTES RELATIVE PERCENT: 37.5 % (ref 20–42)
LYMPHOCYTES RELATIVE PERCENT: 37.5 % (ref 20–42)
LYMPHOCYTES RELATIVE PERCENT: 37.6 % (ref 20–42)
LYMPHOCYTES RELATIVE PERCENT: 42.1 % (ref 20–42)
LYMPHOCYTES RELATIVE PERCENT: 43 % (ref 20–42)
LYMPHOCYTES RELATIVE PERCENT: 45.1 % (ref 20–42)
Lab: ABNORMAL
MAGNESIUM: 1 MG/DL (ref 1.6–2.6)
MAGNESIUM: 1.2 MG/DL (ref 1.6–2.6)
MAGNESIUM: 1.5 MG/DL (ref 1.6–2.6)
MAGNESIUM: 1.5 MG/DL (ref 1.6–2.6)
MAGNESIUM: 1.7 MG/DL (ref 1.6–2.6)
MAGNESIUM: 1.7 MG/DL (ref 1.6–2.6)
MAGNESIUM: 2 MG/DL (ref 1.6–2.6)
MAGNESIUM: 2.2 MG/DL (ref 1.6–2.6)
MAGNESIUM: 2.3 MG/DL (ref 1.6–2.6)
MAGNESIUM: 2.5 MG/DL (ref 1.6–2.6)
MCH RBC QN AUTO: 33.6 PG (ref 26–35)
MCH RBC QN AUTO: 34.1 PG (ref 26–35)
MCH RBC QN AUTO: 34.1 PG (ref 26–35)
MCH RBC QN AUTO: 34.2 PG (ref 26–35)
MCH RBC QN AUTO: 34.2 PG (ref 26–35)
MCH RBC QN AUTO: 34.3 PG (ref 26–35)
MCH RBC QN AUTO: 34.4 PG (ref 26–35)
MCH RBC QN AUTO: 34.5 PG (ref 26–35)
MCH RBC QN AUTO: 34.6 PG (ref 26–35)
MCH RBC QN AUTO: 34.8 PG (ref 26–35)
MCH RBC QN AUTO: 34.9 PG (ref 26–35)
MCH RBC QN AUTO: 35.6 PG (ref 26–35)
MCH RBC QN AUTO: 35.8 PG (ref 26–35)
MCH RBC QN AUTO: 36.4 PG (ref 26–35)
MCH RBC QN AUTO: 36.8 PG (ref 26–35)
MCHC RBC AUTO-ENTMCNC: 31.8 % (ref 32–34.5)
MCHC RBC AUTO-ENTMCNC: 31.9 % (ref 32–34.5)
MCHC RBC AUTO-ENTMCNC: 32.1 % (ref 32–34.5)
MCHC RBC AUTO-ENTMCNC: 32.4 % (ref 32–34.5)
MCHC RBC AUTO-ENTMCNC: 32.5 % (ref 32–34.5)
MCHC RBC AUTO-ENTMCNC: 32.7 % (ref 32–34.5)
MCHC RBC AUTO-ENTMCNC: 33 % (ref 32–34.5)
MCHC RBC AUTO-ENTMCNC: 33.1 % (ref 32–34.5)
MCHC RBC AUTO-ENTMCNC: 33.4 % (ref 32–34.5)
MCHC RBC AUTO-ENTMCNC: 33.7 % (ref 32–34.5)
MCHC RBC AUTO-ENTMCNC: 33.7 % (ref 32–34.5)
MCHC RBC AUTO-ENTMCNC: 34 % (ref 32–34.5)
MCHC RBC AUTO-ENTMCNC: 34.5 % (ref 32–34.5)
MCHC RBC AUTO-ENTMCNC: 35.1 % (ref 32–34.5)
MCV RBC AUTO: 100.3 FL (ref 80–99.9)
MCV RBC AUTO: 102.2 FL (ref 80–99.9)
MCV RBC AUTO: 103 FL (ref 80–99.9)
MCV RBC AUTO: 103.8 FL (ref 80–99.9)
MCV RBC AUTO: 103.9 FL (ref 80–99.9)
MCV RBC AUTO: 104.3 FL (ref 80–99.9)
MCV RBC AUTO: 104.9 FL (ref 80–99.9)
MCV RBC AUTO: 105 FL (ref 80–99.9)
MCV RBC AUTO: 105.5 FL (ref 80–99.9)
MCV RBC AUTO: 105.8 FL (ref 80–99.9)
MCV RBC AUTO: 106.4 FL (ref 80–99.9)
MCV RBC AUTO: 106.9 FL (ref 80–99.9)
MCV RBC AUTO: 107.2 FL (ref 80–99.9)
MCV RBC AUTO: 107.4 FL (ref 80–99.9)
MCV RBC AUTO: 107.5 FL (ref 80–99.9)
MCV RBC AUTO: 107.5 FL (ref 80–99.9)
MCV RBC AUTO: 110.2 FL (ref 80–99.9)
METHB: 0.2 % (ref 0–1.5)
MODE: ABNORMAL
MONOCYTES ABSOLUTE: 0.32 E9/L (ref 0.1–0.95)
MONOCYTES ABSOLUTE: 0.32 E9/L (ref 0.1–0.95)
MONOCYTES ABSOLUTE: 0.33 E9/L (ref 0.1–0.95)
MONOCYTES ABSOLUTE: 0.34 E9/L (ref 0.1–0.95)
MONOCYTES ABSOLUTE: 0.35 E9/L (ref 0.1–0.95)
MONOCYTES ABSOLUTE: 0.42 E9/L (ref 0.1–0.95)
MONOCYTES ABSOLUTE: 0.42 E9/L (ref 0.1–0.95)
MONOCYTES ABSOLUTE: 0.43 E9/L (ref 0.1–0.95)
MONOCYTES ABSOLUTE: 0.43 E9/L (ref 0.1–0.95)
MONOCYTES ABSOLUTE: 0.44 E9/L (ref 0.1–0.95)
MONOCYTES ABSOLUTE: 0.46 E9/L (ref 0.1–0.95)
MONOCYTES ABSOLUTE: 0.48 E9/L (ref 0.1–0.95)
MONOCYTES ABSOLUTE: 0.48 E9/L (ref 0.1–0.95)
MONOCYTES ABSOLUTE: 0.5 E9/L (ref 0.1–0.95)
MONOCYTES ABSOLUTE: 0.52 E9/L (ref 0.1–0.95)
MONOCYTES ABSOLUTE: 0.57 E9/L (ref 0.1–0.95)
MONOCYTES ABSOLUTE: 0.73 E9/L (ref 0.1–0.95)
MONOCYTES RELATIVE PERCENT: 4 % (ref 2–12)
MONOCYTES RELATIVE PERCENT: 4.3 % (ref 2–12)
MONOCYTES RELATIVE PERCENT: 4.9 % (ref 2–12)
MONOCYTES RELATIVE PERCENT: 5 % (ref 2–12)
MONOCYTES RELATIVE PERCENT: 5.2 % (ref 2–12)
MONOCYTES RELATIVE PERCENT: 5.3 % (ref 2–12)
MONOCYTES RELATIVE PERCENT: 5.3 % (ref 2–12)
MONOCYTES RELATIVE PERCENT: 5.6 % (ref 2–12)
MONOCYTES RELATIVE PERCENT: 5.9 % (ref 2–12)
MONOCYTES RELATIVE PERCENT: 6.3 % (ref 2–12)
MONOCYTES RELATIVE PERCENT: 6.4 % (ref 2–12)
MONOCYTES RELATIVE PERCENT: 6.5 % (ref 2–12)
MONOCYTES RELATIVE PERCENT: 6.6 % (ref 2–12)
MONOCYTES RELATIVE PERCENT: 6.6 % (ref 2–12)
MONOCYTES RELATIVE PERCENT: 6.7 % (ref 2–12)
NEUTROPHILS ABSOLUTE: 2.28 E9/L (ref 1.8–7.3)
NEUTROPHILS ABSOLUTE: 3.03 E9/L (ref 1.8–7.3)
NEUTROPHILS ABSOLUTE: 3.4 E9/L (ref 1.8–7.3)
NEUTROPHILS ABSOLUTE: 3.43 E9/L (ref 1.8–7.3)
NEUTROPHILS ABSOLUTE: 4.1 E9/L (ref 1.8–7.3)
NEUTROPHILS ABSOLUTE: 4.1 E9/L (ref 1.8–7.3)
NEUTROPHILS ABSOLUTE: 4.18 E9/L (ref 1.8–7.3)
NEUTROPHILS ABSOLUTE: 4.24 E9/L (ref 1.8–7.3)
NEUTROPHILS ABSOLUTE: 4.54 E9/L (ref 1.8–7.3)
NEUTROPHILS ABSOLUTE: 4.71 E9/L (ref 1.8–7.3)
NEUTROPHILS ABSOLUTE: 4.77 E9/L (ref 1.8–7.3)
NEUTROPHILS ABSOLUTE: 4.88 E9/L (ref 1.8–7.3)
NEUTROPHILS ABSOLUTE: 5.04 E9/L (ref 1.8–7.3)
NEUTROPHILS ABSOLUTE: 5.09 E9/L (ref 1.8–7.3)
NEUTROPHILS ABSOLUTE: 6.88 E9/L (ref 1.8–7.3)
NEUTROPHILS ABSOLUTE: 7.01 E9/L (ref 1.8–7.3)
NEUTROPHILS ABSOLUTE: 8.36 E9/L (ref 1.8–7.3)
NEUTROPHILS RELATIVE PERCENT: 45.3 % (ref 43–80)
NEUTROPHILS RELATIVE PERCENT: 46.1 % (ref 43–80)
NEUTROPHILS RELATIVE PERCENT: 51 % (ref 43–80)
NEUTROPHILS RELATIVE PERCENT: 53 % (ref 43–80)
NEUTROPHILS RELATIVE PERCENT: 53.3 % (ref 43–80)
NEUTROPHILS RELATIVE PERCENT: 53.8 % (ref 43–80)
NEUTROPHILS RELATIVE PERCENT: 55.1 % (ref 43–80)
NEUTROPHILS RELATIVE PERCENT: 57.6 % (ref 43–80)
NEUTROPHILS RELATIVE PERCENT: 59.3 % (ref 43–80)
NEUTROPHILS RELATIVE PERCENT: 60.9 % (ref 43–80)
NEUTROPHILS RELATIVE PERCENT: 61 % (ref 43–80)
NEUTROPHILS RELATIVE PERCENT: 61.6 % (ref 43–80)
NEUTROPHILS RELATIVE PERCENT: 62 % (ref 43–80)
NEUTROPHILS RELATIVE PERCENT: 64.7 % (ref 43–80)
NEUTROPHILS RELATIVE PERCENT: 65 % (ref 43–80)
NEUTROPHILS RELATIVE PERCENT: 67.9 % (ref 43–80)
NEUTROPHILS RELATIVE PERCENT: 73.9 % (ref 43–80)
NITRITE, URINE: POSITIVE
O2 SATURATION: 95.7 % (ref 92–98.5)
O2HB: 94.4 % (ref 94–97)
OPERATOR ID: 925
ORGANISM: ABNORMAL
PATIENT TEMP: 37 C
PCO2: 26 MMHG (ref 35–45)
PDW BLD-RTO: 13.3 FL (ref 11.5–15)
PDW BLD-RTO: 13.5 FL (ref 11.5–15)
PDW BLD-RTO: 13.6 FL (ref 11.5–15)
PDW BLD-RTO: 13.7 FL (ref 11.5–15)
PDW BLD-RTO: 13.8 FL (ref 11.5–15)
PDW BLD-RTO: 14.6 FL (ref 11.5–15)
PDW BLD-RTO: 14.7 FL (ref 11.5–15)
PDW BLD-RTO: 14.7 FL (ref 11.5–15)
PDW BLD-RTO: 15.3 FL (ref 11.5–15)
PDW BLD-RTO: 16 FL (ref 11.5–15)
PDW BLD-RTO: 16 FL (ref 11.5–15)
PH BLOOD GAS: 7.39 (ref 7.35–7.45)
PH UA: 6 (ref 5–9)
PH UA: 7 (ref 5–9)
PH UA: 7.5 (ref 5–9)
PH UA: >=9 (ref 5–9)
PHOSPHORUS: 2 MG/DL (ref 2.5–4.5)
PHOSPHORUS: 2.9 MG/DL (ref 2.5–4.5)
PHOSPHORUS: 2.9 MG/DL (ref 2.5–4.5)
PHOSPHORUS: 3 MG/DL (ref 2.5–4.5)
PHOSPHORUS: 3.5 MG/DL (ref 2.5–4.5)
PHOSPHORUS: 3.5 MG/DL (ref 2.5–4.5)
PHOSPHORUS: 3.7 MG/DL (ref 2.5–4.5)
PHOSPHORUS: 3.8 MG/DL (ref 2.5–4.5)
PHOSPHORUS: 3.9 MG/DL (ref 2.5–4.5)
PHOSPHORUS: 4.3 MG/DL (ref 2.5–4.5)
PHOSPHORUS: 4.3 MG/DL (ref 2.5–4.5)
PHOSPHORUS: 5.9 MG/DL (ref 2.5–4.5)
PLATELET # BLD: 226 E9/L (ref 130–450)
PLATELET # BLD: 237 E9/L (ref 130–450)
PLATELET # BLD: 252 E9/L (ref 130–450)
PLATELET # BLD: 256 E9/L (ref 130–450)
PLATELET # BLD: 267 E9/L (ref 130–450)
PLATELET # BLD: 282 E9/L (ref 130–450)
PLATELET # BLD: 286 E9/L (ref 130–450)
PLATELET # BLD: 301 E9/L (ref 130–450)
PLATELET # BLD: 337 E9/L (ref 130–450)
PLATELET # BLD: 398 E9/L (ref 130–450)
PLATELET # BLD: 418 E9/L (ref 130–450)
PLATELET # BLD: 429 E9/L (ref 130–450)
PLATELET # BLD: 465 E9/L (ref 130–450)
PLATELET # BLD: 483 E9/L (ref 130–450)
PLATELET # BLD: 505 E9/L (ref 130–450)
PLATELET # BLD: 512 E9/L (ref 130–450)
PLATELET # BLD: 535 E9/L (ref 130–450)
PMV BLD AUTO: 10 FL (ref 7–12)
PMV BLD AUTO: 10.1 FL (ref 7–12)
PMV BLD AUTO: 10.2 FL (ref 7–12)
PMV BLD AUTO: 10.2 FL (ref 7–12)
PMV BLD AUTO: 10.3 FL (ref 7–12)
PMV BLD AUTO: 10.4 FL (ref 7–12)
PMV BLD AUTO: 10.6 FL (ref 7–12)
PMV BLD AUTO: 10.6 FL (ref 7–12)
PMV BLD AUTO: 9.6 FL (ref 7–12)
PMV BLD AUTO: 9.9 FL (ref 7–12)
PO2: 78.2 MMHG (ref 75–100)
POLYCHROMASIA: ABNORMAL
POTASSIUM REFLEX MAGNESIUM: 3 MMOL/L (ref 3.5–5)
POTASSIUM REFLEX MAGNESIUM: 3.3 MMOL/L (ref 3.5–5)
POTASSIUM SERPL-SCNC: 2.6 MMOL/L (ref 3.5–5)
POTASSIUM SERPL-SCNC: 3.1 MMOL/L (ref 3.5–5)
POTASSIUM SERPL-SCNC: 3.1 MMOL/L (ref 3.5–5)
POTASSIUM SERPL-SCNC: 3.2 MMOL/L (ref 3.5–5)
POTASSIUM SERPL-SCNC: 3.3 MMOL/L (ref 3.5–5)
POTASSIUM SERPL-SCNC: 3.4 MMOL/L (ref 3.5–5)
POTASSIUM SERPL-SCNC: 3.5 MMOL/L (ref 3.5–5)
POTASSIUM SERPL-SCNC: 3.6 MMOL/L (ref 3.5–5)
POTASSIUM SERPL-SCNC: 3.8 MMOL/L (ref 3.5–5)
POTASSIUM SERPL-SCNC: 3.9 MMOL/L (ref 3.5–5)
POTASSIUM SERPL-SCNC: 4 MMOL/L (ref 3.5–5)
POTASSIUM SERPL-SCNC: 4.6 MMOL/L (ref 3.5–5)
POTASSIUM, UR: 25.7 MMOL/L
PRO-BNP: 331 PG/ML (ref 0–450)
PRO-BNP: 760 PG/ML (ref 0–450)
PROCALCITONIN: 0.4 NG/ML (ref 0–0.08)
PROTEIN UA: 30 MG/DL
PROTEIN UA: ABNORMAL MG/DL
PROTEIN UA: NEGATIVE MG/DL
PROTEIN UA: NEGATIVE MG/DL
RBC # BLD: 2.82 E12/L (ref 3.5–5.5)
RBC # BLD: 2.85 E12/L (ref 3.5–5.5)
RBC # BLD: 2.89 E12/L (ref 3.5–5.5)
RBC # BLD: 2.95 E12/L (ref 3.5–5.5)
RBC # BLD: 2.95 E12/L (ref 3.5–5.5)
RBC # BLD: 3.21 E12/L (ref 3.5–5.5)
RBC # BLD: 3.22 E12/L (ref 3.5–5.5)
RBC # BLD: 3.24 E12/L (ref 3.5–5.5)
RBC # BLD: 3.3 E12/L (ref 3.5–5.5)
RBC # BLD: 3.35 E12/L (ref 3.5–5.5)
RBC # BLD: 3.37 E12/L (ref 3.5–5.5)
RBC # BLD: 3.45 E12/L (ref 3.5–5.5)
RBC # BLD: 3.6 E12/L (ref 3.5–5.5)
RBC # BLD: 3.64 E12/L (ref 3.5–5.5)
RBC # BLD: 3.72 E12/L (ref 3.5–5.5)
RBC UA: ABNORMAL /HPF (ref 0–2)
REASON FOR REJECTION: NORMAL
REJECTED TEST: NORMAL
SARS-COV-2, NAAT: NOT DETECTED
SARS-COV-2: NOT DETECTED
SEDIMENTATION RATE, ERYTHROCYTE: 102 MM/HR (ref 0–20)
SEDIMENTATION RATE, ERYTHROCYTE: 57 MM/HR (ref 0–20)
SEDIMENTATION RATE, ERYTHROCYTE: 81 MM/HR (ref 0–20)
SEDIMENTATION RATE, ERYTHROCYTE: 81 MM/HR (ref 0–20)
SEDIMENTATION RATE, ERYTHROCYTE: 85 MM/HR (ref 0–20)
SEDIMENTATION RATE, ERYTHROCYTE: 86 MM/HR (ref 0–20)
SEDIMENTATION RATE, ERYTHROCYTE: 88 MM/HR (ref 0–20)
SEDIMENTATION RATE, ERYTHROCYTE: 91 MM/HR (ref 0–20)
SEDIMENTATION RATE, ERYTHROCYTE: 96 MM/HR (ref 0–20)
SEDIMENTATION RATE, ERYTHROCYTE: 97 MM/HR (ref 0–20)
SODIUM BLD-SCNC: 130 MMOL/L (ref 132–146)
SODIUM BLD-SCNC: 131 MMOL/L (ref 132–146)
SODIUM BLD-SCNC: 133 MMOL/L (ref 132–146)
SODIUM BLD-SCNC: 133 MMOL/L (ref 132–146)
SODIUM BLD-SCNC: 135 MMOL/L (ref 132–146)
SODIUM BLD-SCNC: 136 MMOL/L (ref 132–146)
SODIUM BLD-SCNC: 136 MMOL/L (ref 132–146)
SODIUM BLD-SCNC: 137 MMOL/L (ref 132–146)
SODIUM BLD-SCNC: 138 MMOL/L (ref 132–146)
SODIUM BLD-SCNC: 138 MMOL/L (ref 132–146)
SODIUM BLD-SCNC: 139 MMOL/L (ref 132–146)
SODIUM BLD-SCNC: 139 MMOL/L (ref 132–146)
SODIUM BLD-SCNC: 141 MMOL/L (ref 132–146)
SODIUM BLD-SCNC: 141 MMOL/L (ref 132–146)
SODIUM BLD-SCNC: 142 MMOL/L (ref 132–146)
SODIUM BLD-SCNC: 142 MMOL/L (ref 132–146)
SODIUM BLD-SCNC: 143 MMOL/L (ref 132–146)
SODIUM BLD-SCNC: 144 MMOL/L (ref 132–146)
SODIUM BLD-SCNC: 145 MMOL/L (ref 132–146)
SODIUM BLD-SCNC: 147 MMOL/L (ref 132–146)
SODIUM URINE: 73 MMOL/L
SOURCE, BLOOD GAS: ABNORMAL
SOURCE: 1
SOURCE: NORMAL
SPECIFIC GRAVITY UA: 1.01 (ref 1–1.03)
SPECIFIC GRAVITY UA: <=1.005 (ref 1–1.03)
THB: 13.4 G/DL (ref 11.5–16.5)
TIME ANALYZED: 1509
TOTAL CK: 101 U/L (ref 20–180)
TOTAL CK: 25 U/L (ref 20–180)
TOTAL CK: 27 U/L (ref 20–180)
TOTAL CK: 28 U/L (ref 20–180)
TOTAL CK: 308 U/L (ref 20–180)
TOTAL CK: 31 U/L (ref 20–180)
TOTAL CK: 45 U/L (ref 20–180)
TOTAL CK: 53 U/L (ref 20–180)
TOTAL CK: 60 U/L (ref 20–180)
TOTAL CK: 88 U/L (ref 20–180)
TOTAL IRON BINDING CAPACITY: 270 MCG/DL (ref 250–450)
TOTAL PROTEIN: 5.7 G/DL (ref 6.4–8.3)
TOTAL PROTEIN: 5.9 G/DL (ref 6.4–8.3)
TOTAL PROTEIN: 6.2 G/DL (ref 6.4–8.3)
TOTAL PROTEIN: 6.3 G/DL (ref 6.4–8.3)
TOTAL PROTEIN: 6.3 G/DL (ref 6.4–8.3)
TOTAL PROTEIN: 6.4 G/DL (ref 6.4–8.3)
TOTAL PROTEIN: 6.4 G/DL (ref 6.4–8.3)
TOTAL PROTEIN: 6.5 G/DL (ref 6.4–8.3)
TOTAL PROTEIN: 6.5 G/DL (ref 6.4–8.3)
TOTAL PROTEIN: 6.7 G/DL (ref 6.4–8.3)
TOTAL PROTEIN: 7.2 G/DL (ref 6.4–8.3)
TOTAL PROTEIN: 7.2 G/DL (ref 6.4–8.3)
TOTAL PROTEIN: 7.6 G/DL (ref 6.4–8.3)
TOTAL PROTEIN: 7.7 G/DL (ref 6.4–8.3)
TRIGL SERPL-MCNC: 181 MG/DL (ref 0–149)
TROPONIN: <0.01 NG/ML (ref 0–0.03)
TSH SERPL DL<=0.05 MIU/L-ACNC: 2.24 UIU/ML (ref 0.27–4.2)
TSH SERPL DL<=0.05 MIU/L-ACNC: 4.2 UIU/ML (ref 0.27–4.2)
URIC ACID, SERUM: 13.2 MG/DL (ref 2.4–5.7)
URIC ACID, SERUM: 7.2 MG/DL (ref 2.4–5.7)
URINE CULTURE, ROUTINE: ABNORMAL
UROBILINOGEN, URINE: 0.2 E.U./DL
UROBILINOGEN, URINE: 1 E.U./DL
VITAMIN B-12: 1547 PG/ML (ref 211–946)
VITAMIN B-12: 1583 PG/ML (ref 211–946)
VITAMIN B-12: >2000 PG/ML (ref 211–946)
VITAMIN D 25-HYDROXY: 30 NG/ML (ref 30–100)
VLDLC SERPL CALC-MCNC: 36 MG/DL
WBC # BLD: 10.8 E9/L (ref 4.5–11.5)
WBC # BLD: 12.3 E9/L (ref 4.5–11.5)
WBC # BLD: 5 E9/L (ref 4.5–11.5)
WBC # BLD: 6.4 E9/L (ref 4.5–11.5)
WBC # BLD: 6.4 E9/L (ref 4.5–11.5)
WBC # BLD: 6.6 E9/L (ref 4.5–11.5)
WBC # BLD: 7 E9/L (ref 4.5–11.5)
WBC # BLD: 7 E9/L (ref 4.5–11.5)
WBC # BLD: 7.4 E9/L (ref 4.5–11.5)
WBC # BLD: 7.7 E9/L (ref 4.5–11.5)
WBC # BLD: 7.9 E9/L (ref 4.5–11.5)
WBC # BLD: 8 E9/L (ref 4.5–11.5)
WBC # BLD: 8.1 E9/L (ref 4.5–11.5)
WBC # BLD: 8.2 E9/L (ref 4.5–11.5)
WBC # BLD: 8.3 E9/L (ref 4.5–11.5)
WBC # BLD: 8.3 E9/L (ref 4.5–11.5)
WBC # BLD: 9.3 E9/L (ref 4.5–11.5)
WBC UA: >20 /HPF (ref 0–5)
WBC UA: >20 /HPF (ref 0–5)
WBC UA: ABNORMAL /HPF (ref 0–5)
WBC UA: ABNORMAL /HPF (ref 0–5)

## 2020-01-01 PROCEDURE — 99232 SBSQ HOSP IP/OBS MODERATE 35: CPT | Performed by: FAMILY MEDICINE

## 2020-01-01 PROCEDURE — 97535 SELF CARE MNGMENT TRAINING: CPT

## 2020-01-01 PROCEDURE — 6360000002 HC RX W HCPCS: Performed by: CLINICAL NURSE SPECIALIST

## 2020-01-01 PROCEDURE — 6370000000 HC RX 637 (ALT 250 FOR IP): Performed by: INTERNAL MEDICINE

## 2020-01-01 PROCEDURE — U0003 INFECTIOUS AGENT DETECTION BY NUCLEIC ACID (DNA OR RNA); SEVERE ACUTE RESPIRATORY SYNDROME CORONAVIRUS 2 (SARS-COV-2) (CORONAVIRUS DISEASE [COVID-19]), AMPLIFIED PROBE TECHNIQUE, MAKING USE OF HIGH THROUGHPUT TECHNOLOGIES AS DESCRIBED BY CMS-2020-01-R: HCPCS

## 2020-01-01 PROCEDURE — 87077 CULTURE AEROBIC IDENTIFY: CPT

## 2020-01-01 PROCEDURE — 36415 COLL VENOUS BLD VENIPUNCTURE: CPT

## 2020-01-01 PROCEDURE — 83605 ASSAY OF LACTIC ACID: CPT

## 2020-01-01 PROCEDURE — 83735 ASSAY OF MAGNESIUM: CPT

## 2020-01-01 PROCEDURE — G0378 HOSPITAL OBSERVATION PER HR: HCPCS

## 2020-01-01 PROCEDURE — 80048 BASIC METABOLIC PNL TOTAL CA: CPT

## 2020-01-01 PROCEDURE — 82306 VITAMIN D 25 HYDROXY: CPT

## 2020-01-01 PROCEDURE — 80053 COMPREHEN METABOLIC PANEL: CPT

## 2020-01-01 PROCEDURE — 99231 SBSQ HOSP IP/OBS SF/LOW 25: CPT | Performed by: PHYSICIAN ASSISTANT

## 2020-01-01 PROCEDURE — 84443 ASSAY THYROID STIM HORMONE: CPT

## 2020-01-01 PROCEDURE — 2580000003 HC RX 258: Performed by: SURGERY

## 2020-01-01 PROCEDURE — 2140000000 HC CCU INTERMEDIATE R&B

## 2020-01-01 PROCEDURE — 84300 ASSAY OF URINE SODIUM: CPT

## 2020-01-01 PROCEDURE — 6360000002 HC RX W HCPCS: Performed by: GENERAL PRACTICE

## 2020-01-01 PROCEDURE — 82805 BLOOD GASES W/O2 SATURATION: CPT

## 2020-01-01 PROCEDURE — 82550 ASSAY OF CK (CPK): CPT

## 2020-01-01 PROCEDURE — 3609012400 HC EGD TRANSORAL BIOPSY SINGLE/MULTIPLE: Performed by: SURGERY

## 2020-01-01 PROCEDURE — 97110 THERAPEUTIC EXERCISES: CPT

## 2020-01-01 PROCEDURE — 83880 ASSAY OF NATRIURETIC PEPTIDE: CPT

## 2020-01-01 PROCEDURE — 6370000000 HC RX 637 (ALT 250 FOR IP): Performed by: PHYSICIAN ASSISTANT

## 2020-01-01 PROCEDURE — 86140 C-REACTIVE PROTEIN: CPT

## 2020-01-01 PROCEDURE — 2580000003 HC RX 258: Performed by: GENERAL PRACTICE

## 2020-01-01 PROCEDURE — 85025 COMPLETE CBC W/AUTO DIFF WBC: CPT

## 2020-01-01 PROCEDURE — 84484 ASSAY OF TROPONIN QUANT: CPT

## 2020-01-01 PROCEDURE — 85651 RBC SED RATE NONAUTOMATED: CPT

## 2020-01-01 PROCEDURE — 70450 CT HEAD/BRAIN W/O DYE: CPT

## 2020-01-01 PROCEDURE — 80076 HEPATIC FUNCTION PANEL: CPT

## 2020-01-01 PROCEDURE — 87040 BLOOD CULTURE FOR BACTERIA: CPT

## 2020-01-01 PROCEDURE — 6370000000 HC RX 637 (ALT 250 FOR IP): Performed by: FAMILY MEDICINE

## 2020-01-01 PROCEDURE — 2580000003 HC RX 258: Performed by: INTERNAL MEDICINE

## 2020-01-01 PROCEDURE — 2580000003 HC RX 258: Performed by: NURSE PRACTITIONER

## 2020-01-01 PROCEDURE — 84100 ASSAY OF PHOSPHORUS: CPT

## 2020-01-01 PROCEDURE — 82248 BILIRUBIN DIRECT: CPT

## 2020-01-01 PROCEDURE — 2500000003 HC RX 250 WO HCPCS: Performed by: SURGERY

## 2020-01-01 PROCEDURE — 97161 PT EVAL LOW COMPLEX 20 MIN: CPT

## 2020-01-01 PROCEDURE — 6360000002 HC RX W HCPCS: Performed by: INTERNAL MEDICINE

## 2020-01-01 PROCEDURE — 82607 VITAMIN B-12: CPT

## 2020-01-01 PROCEDURE — 6360000002 HC RX W HCPCS: Performed by: NURSE PRACTITIONER

## 2020-01-01 PROCEDURE — 74176 CT ABD & PELVIS W/O CONTRAST: CPT

## 2020-01-01 PROCEDURE — 97530 THERAPEUTIC ACTIVITIES: CPT

## 2020-01-01 PROCEDURE — 84550 ASSAY OF BLOOD/URIC ACID: CPT

## 2020-01-01 PROCEDURE — 2500000003 HC RX 250 WO HCPCS: Performed by: INTERNAL MEDICINE

## 2020-01-01 PROCEDURE — 1200000000 HC SEMI PRIVATE

## 2020-01-01 PROCEDURE — 93306 TTE W/DOPPLER COMPLETE: CPT

## 2020-01-01 PROCEDURE — 6370000000 HC RX 637 (ALT 250 FOR IP): Performed by: SURGERY

## 2020-01-01 PROCEDURE — 96376 TX/PRO/DX INJ SAME DRUG ADON: CPT

## 2020-01-01 PROCEDURE — 87088 URINE BACTERIA CULTURE: CPT

## 2020-01-01 PROCEDURE — 6360000002 HC RX W HCPCS: Performed by: SURGERY

## 2020-01-01 PROCEDURE — 96361 HYDRATE IV INFUSION ADD-ON: CPT

## 2020-01-01 PROCEDURE — 84145 PROCALCITONIN (PCT): CPT

## 2020-01-01 PROCEDURE — 82436 ASSAY OF URINE CHLORIDE: CPT

## 2020-01-01 PROCEDURE — 3609013300 HC EGD TUBE PLACEMENT: Performed by: SURGERY

## 2020-01-01 PROCEDURE — 6370000000 HC RX 637 (ALT 250 FOR IP): Performed by: NURSE PRACTITIONER

## 2020-01-01 PROCEDURE — C9113 INJ PANTOPRAZOLE SODIUM, VIA: HCPCS | Performed by: INTERNAL MEDICINE

## 2020-01-01 PROCEDURE — 74018 RADEX ABDOMEN 1 VIEW: CPT

## 2020-01-01 PROCEDURE — 83550 IRON BINDING TEST: CPT

## 2020-01-01 PROCEDURE — 6360000002 HC RX W HCPCS: Performed by: EMERGENCY MEDICINE

## 2020-01-01 PROCEDURE — 87186 SC STD MICRODIL/AGAR DIL: CPT

## 2020-01-01 PROCEDURE — 96372 THER/PROPH/DIAG INJ SC/IM: CPT

## 2020-01-01 PROCEDURE — 3700000000 HC ANESTHESIA ATTENDED CARE: Performed by: SURGERY

## 2020-01-01 PROCEDURE — 97112 NEUROMUSCULAR REEDUCATION: CPT

## 2020-01-01 PROCEDURE — 81001 URINALYSIS AUTO W/SCOPE: CPT

## 2020-01-01 PROCEDURE — 99221 1ST HOSP IP/OBS SF/LOW 40: CPT | Performed by: PHYSICIAN ASSISTANT

## 2020-01-01 PROCEDURE — 84133 ASSAY OF URINE POTASSIUM: CPT

## 2020-01-01 PROCEDURE — 88305 TISSUE EXAM BY PATHOLOGIST: CPT

## 2020-01-01 PROCEDURE — 2580000003 HC RX 258: Performed by: EMERGENCY MEDICINE

## 2020-01-01 PROCEDURE — 82728 ASSAY OF FERRITIN: CPT

## 2020-01-01 PROCEDURE — 2500000003 HC RX 250 WO HCPCS: Performed by: NURSE PRACTITIONER

## 2020-01-01 PROCEDURE — 2580000003 HC RX 258: Performed by: NURSE ANESTHETIST, CERTIFIED REGISTERED

## 2020-01-01 PROCEDURE — 0DB98ZX EXCISION OF DUODENUM, VIA NATURAL OR ARTIFICIAL OPENING ENDOSCOPIC, DIAGNOSTIC: ICD-10-PCS | Performed by: INTERNAL MEDICINE

## 2020-01-01 PROCEDURE — 2709999900 HC NON-CHARGEABLE SUPPLY: Performed by: SURGERY

## 2020-01-01 PROCEDURE — 99285 EMERGENCY DEPT VISIT HI MDM: CPT

## 2020-01-01 PROCEDURE — 82746 ASSAY OF FOLIC ACID SERUM: CPT

## 2020-01-01 PROCEDURE — 6360000002 HC RX W HCPCS: Performed by: NURSE ANESTHETIST, CERTIFIED REGISTERED

## 2020-01-01 PROCEDURE — 83036 HEMOGLOBIN GLYCOSYLATED A1C: CPT

## 2020-01-01 PROCEDURE — 51702 INSERT TEMP BLADDER CATH: CPT

## 2020-01-01 PROCEDURE — 80061 LIPID PANEL: CPT

## 2020-01-01 PROCEDURE — 7100000000 HC PACU RECOVERY - FIRST 15 MIN: Performed by: SURGERY

## 2020-01-01 PROCEDURE — 2700000000 HC OXYGEN THERAPY PER DAY

## 2020-01-01 PROCEDURE — 82570 ASSAY OF URINE CREATININE: CPT

## 2020-01-01 PROCEDURE — 3700000001 HC ADD 15 MINUTES (ANESTHESIA): Performed by: SURGERY

## 2020-01-01 PROCEDURE — 96374 THER/PROPH/DIAG INJ IV PUSH: CPT

## 2020-01-01 PROCEDURE — 2500000003 HC RX 250 WO HCPCS: Performed by: CLINICAL NURSE SPECIALIST

## 2020-01-01 PROCEDURE — 99284 EMERGENCY DEPT VISIT MOD MDM: CPT

## 2020-01-01 PROCEDURE — 93010 ELECTROCARDIOGRAM REPORT: CPT | Performed by: INTERNAL MEDICINE

## 2020-01-01 PROCEDURE — 71045 X-RAY EXAM CHEST 1 VIEW: CPT

## 2020-01-01 PROCEDURE — 2580000003 HC RX 258

## 2020-01-01 PROCEDURE — 99283 EMERGENCY DEPT VISIT LOW MDM: CPT

## 2020-01-01 PROCEDURE — 97166 OT EVAL MOD COMPLEX 45 MIN: CPT

## 2020-01-01 PROCEDURE — 93005 ELECTROCARDIOGRAM TRACING: CPT | Performed by: GENERAL PRACTICE

## 2020-01-01 PROCEDURE — 6360000004 HC RX CONTRAST MEDICATION: Performed by: INTERNAL MEDICINE

## 2020-01-01 PROCEDURE — 97165 OT EVAL LOW COMPLEX 30 MIN: CPT

## 2020-01-01 PROCEDURE — U0002 COVID-19 LAB TEST NON-CDC: HCPCS

## 2020-01-01 PROCEDURE — 7100000001 HC PACU RECOVERY - ADDTL 15 MIN: Performed by: SURGERY

## 2020-01-01 PROCEDURE — 83540 ASSAY OF IRON: CPT

## 2020-01-01 PROCEDURE — 2580000003 HC RX 258: Performed by: CLINICAL NURSE SPECIALIST

## 2020-01-01 PROCEDURE — 0DH63UZ INSERTION OF FEEDING DEVICE INTO STOMACH, PERCUTANEOUS APPROACH: ICD-10-PCS | Performed by: INTERNAL MEDICINE

## 2020-01-01 RX ORDER — 0.9 % SODIUM CHLORIDE 0.9 %
500 INTRAVENOUS SOLUTION INTRAVENOUS ONCE
Status: COMPLETED | OUTPATIENT
Start: 2020-01-01 | End: 2020-01-01

## 2020-01-01 RX ORDER — SODIUM CHLORIDE AND POTASSIUM CHLORIDE .9; .15 G/100ML; G/100ML
SOLUTION INTRAVENOUS CONTINUOUS
Status: DISCONTINUED | OUTPATIENT
Start: 2020-01-01 | End: 2020-01-01

## 2020-01-01 RX ORDER — SODIUM CHLORIDE 0.9 % (FLUSH) 0.9 %
SYRINGE (ML) INJECTION
Status: COMPLETED
Start: 2020-01-01 | End: 2020-01-01

## 2020-01-01 RX ORDER — HEPARIN SODIUM 10000 [USP'U]/ML
5000 INJECTION, SOLUTION INTRAVENOUS; SUBCUTANEOUS EVERY 8 HOURS
Status: DISCONTINUED | OUTPATIENT
Start: 2020-01-01 | End: 2020-01-01

## 2020-01-01 RX ORDER — RISPERIDONE 0.5 MG/1
0.5 TABLET, FILM COATED ORAL 2 TIMES DAILY
Status: DISCONTINUED | OUTPATIENT
Start: 2020-01-01 | End: 2020-01-01 | Stop reason: HOSPADM

## 2020-01-01 RX ORDER — MAGNESIUM SULFATE IN WATER 40 MG/ML
4 INJECTION, SOLUTION INTRAVENOUS ONCE
Status: COMPLETED | OUTPATIENT
Start: 2020-01-01 | End: 2020-01-01

## 2020-01-01 RX ORDER — SENNA AND DOCUSATE SODIUM 50; 8.6 MG/1; MG/1
2 TABLET, FILM COATED ORAL 2 TIMES DAILY
DISCHARGE
Start: 2020-01-01 | End: 2020-01-01

## 2020-01-01 RX ORDER — RISPERIDONE 0.5 MG/1
1 TABLET, FILM COATED ORAL 2 TIMES DAILY
COMMUNITY

## 2020-01-01 RX ORDER — MIDODRINE HYDROCHLORIDE 5 MG/1
5 TABLET ORAL
Status: DISCONTINUED | OUTPATIENT
Start: 2020-01-01 | End: 2020-01-01

## 2020-01-01 RX ORDER — ONDANSETRON 2 MG/ML
4 INJECTION INTRAMUSCULAR; INTRAVENOUS EVERY 6 HOURS PRN
Status: DISCONTINUED | OUTPATIENT
Start: 2020-01-01 | End: 2020-01-01 | Stop reason: HOSPADM

## 2020-01-01 RX ORDER — POLYETHYLENE GLYCOL 3350 17 G/17G
17 POWDER, FOR SOLUTION ORAL DAILY PRN
COMMUNITY

## 2020-01-01 RX ORDER — PROPOFOL 10 MG/ML
INJECTION, EMULSION INTRAVENOUS PRN
Status: DISCONTINUED | OUTPATIENT
Start: 2020-01-01 | End: 2020-01-01 | Stop reason: SDUPTHER

## 2020-01-01 RX ORDER — SPIRONOLACTONE AND HYDROCHLOROTHIAZIDE 25; 25 MG/1; MG/1
1 TABLET ORAL DAILY
Status: ON HOLD | COMMUNITY
End: 2020-01-01 | Stop reason: HOSPADM

## 2020-01-01 RX ORDER — SODIUM CHLORIDE 9 MG/ML
10 INJECTION INTRAVENOUS ONCE
Status: DISCONTINUED | OUTPATIENT
Start: 2020-01-01 | End: 2020-01-01 | Stop reason: ALTCHOICE

## 2020-01-01 RX ORDER — CEPHALEXIN 500 MG/1
500 CAPSULE ORAL 2 TIMES DAILY
Refills: 0 | DISCHARGE
Start: 2020-01-01 | End: 2021-01-01

## 2020-01-01 RX ORDER — SODIUM CHLORIDE 0.9 % (FLUSH) 0.9 %
10 SYRINGE (ML) INJECTION EVERY 12 HOURS SCHEDULED
Status: DISCONTINUED | OUTPATIENT
Start: 2020-01-01 | End: 2020-01-01 | Stop reason: SDUPTHER

## 2020-01-01 RX ORDER — HYDROCODONE BITARTRATE AND ACETAMINOPHEN 5; 325 MG/1; MG/1
1 TABLET ORAL EVERY 6 HOURS PRN
Status: DISCONTINUED | OUTPATIENT
Start: 2020-01-01 | End: 2020-01-01 | Stop reason: HOSPADM

## 2020-01-01 RX ORDER — SODIUM CHLORIDE 9 MG/ML
INJECTION, SOLUTION INTRAVENOUS CONTINUOUS
Status: DISCONTINUED | OUTPATIENT
Start: 2020-01-01 | End: 2020-01-01 | Stop reason: HOSPADM

## 2020-01-01 RX ORDER — POTASSIUM CHLORIDE 20 MEQ/1
20 TABLET, EXTENDED RELEASE ORAL DAILY
Status: ON HOLD | COMMUNITY
End: 2021-01-01 | Stop reason: HOSPADM

## 2020-01-01 RX ORDER — RIVASTIGMINE 4.6 MG/24H
1 PATCH, EXTENDED RELEASE TRANSDERMAL DAILY
Status: DISCONTINUED | OUTPATIENT
Start: 2020-01-01 | End: 2020-01-01 | Stop reason: HOSPADM

## 2020-01-01 RX ORDER — SENNA AND DOCUSATE SODIUM 50; 8.6 MG/1; MG/1
2 TABLET, FILM COATED ORAL 2 TIMES DAILY
Status: DISCONTINUED | OUTPATIENT
Start: 2020-01-01 | End: 2020-01-01

## 2020-01-01 RX ORDER — ALLOPURINOL 100 MG/1
100 TABLET ORAL DAILY
Status: DISCONTINUED | OUTPATIENT
Start: 2020-01-01 | End: 2020-01-01

## 2020-01-01 RX ORDER — SODIUM CHLORIDE 9 MG/ML
INJECTION, SOLUTION INTRAVENOUS CONTINUOUS PRN
Status: DISCONTINUED | OUTPATIENT
Start: 2020-01-01 | End: 2020-01-01 | Stop reason: SDUPTHER

## 2020-01-01 RX ORDER — SODIUM BICARBONATE 650 MG/1
650 TABLET ORAL DAILY
Qty: 30 TABLET | Refills: 0 | DISCHARGE
Start: 2020-01-01 | End: 2020-01-01

## 2020-01-01 RX ORDER — MAGNESIUM SULFATE 1 G/100ML
1 INJECTION INTRAVENOUS ONCE
Status: DISCONTINUED | OUTPATIENT
Start: 2020-01-01 | End: 2020-01-01

## 2020-01-01 RX ORDER — ERGOCALCIFEROL 1.25 MG/1
50000 CAPSULE ORAL
Status: DISCONTINUED | OUTPATIENT
Start: 2021-01-01 | End: 2020-01-01 | Stop reason: HOSPADM

## 2020-01-01 RX ORDER — RIVASTIGMINE 13.3 MG/24H
1 PATCH, EXTENDED RELEASE TRANSDERMAL DAILY
COMMUNITY

## 2020-01-01 RX ORDER — POTASSIUM CHLORIDE 7.45 MG/ML
10 INJECTION INTRAVENOUS
Status: COMPLETED | OUTPATIENT
Start: 2020-01-01 | End: 2020-01-01

## 2020-01-01 RX ORDER — ATENOLOL 50 MG/1
50 TABLET ORAL DAILY
Status: DISCONTINUED | OUTPATIENT
Start: 2020-01-01 | End: 2020-01-01

## 2020-01-01 RX ORDER — METOPROLOL SUCCINATE 50 MG/1
50 TABLET, EXTENDED RELEASE ORAL DAILY
Status: ON HOLD | COMMUNITY
End: 2020-01-01 | Stop reason: HOSPADM

## 2020-01-01 RX ORDER — SODIUM CHLORIDE 9 MG/ML
INJECTION, SOLUTION INTRAVENOUS CONTINUOUS
Status: DISCONTINUED | OUTPATIENT
Start: 2020-01-01 | End: 2020-01-01 | Stop reason: SDUPTHER

## 2020-01-01 RX ORDER — HYDROCODONE BITARTRATE AND ACETAMINOPHEN 5; 325 MG/1; MG/1
1 TABLET ORAL EVERY 6 HOURS PRN
Status: DISCONTINUED | OUTPATIENT
Start: 2020-01-01 | End: 2020-01-01

## 2020-01-01 RX ORDER — MAGNESIUM SULFATE 1 G/100ML
1 INJECTION INTRAVENOUS ONCE
Status: COMPLETED | OUTPATIENT
Start: 2020-01-01 | End: 2020-01-01

## 2020-01-01 RX ORDER — METOPROLOL SUCCINATE 50 MG/1
50 TABLET, EXTENDED RELEASE ORAL DAILY
Status: DISCONTINUED | OUTPATIENT
Start: 2020-01-01 | End: 2020-01-01 | Stop reason: HOSPADM

## 2020-01-01 RX ORDER — METOPROLOL SUCCINATE 50 MG/1
50 TABLET, EXTENDED RELEASE ORAL DAILY
Status: ON HOLD | COMMUNITY
End: 2021-01-01 | Stop reason: HOSPADM

## 2020-01-01 RX ORDER — SODIUM CHLORIDE, SODIUM LACTATE, POTASSIUM CHLORIDE, AND CALCIUM CHLORIDE .6; .31; .03; .02 G/100ML; G/100ML; G/100ML; G/100ML
1000 INJECTION, SOLUTION INTRAVENOUS ONCE
Status: COMPLETED | OUTPATIENT
Start: 2020-01-01 | End: 2020-01-01

## 2020-01-01 RX ORDER — SODIUM CHLORIDE 0.9 % (FLUSH) 0.9 %
10 SYRINGE (ML) INJECTION PRN
Status: DISCONTINUED | OUTPATIENT
Start: 2020-01-01 | End: 2020-01-01 | Stop reason: HOSPADM

## 2020-01-01 RX ORDER — SODIUM CHLORIDE 450 MG/100ML
INJECTION, SOLUTION INTRAVENOUS CONTINUOUS
Status: DISCONTINUED | OUTPATIENT
Start: 2020-01-01 | End: 2020-01-01

## 2020-01-01 RX ORDER — MAGNESIUM SULFATE IN WATER 40 MG/ML
2 INJECTION, SOLUTION INTRAVENOUS ONCE
Status: COMPLETED | OUTPATIENT
Start: 2020-01-01 | End: 2020-01-01

## 2020-01-01 RX ORDER — RISPERIDONE 0.5 MG/1
0.5 TABLET, FILM COATED ORAL DAILY
Status: DISCONTINUED | OUTPATIENT
Start: 2020-01-01 | End: 2020-01-01

## 2020-01-01 RX ORDER — RISPERIDONE 1 MG/1
1 TABLET, FILM COATED ORAL DAILY
Status: DISCONTINUED | OUTPATIENT
Start: 2020-01-01 | End: 2020-01-01

## 2020-01-01 RX ORDER — MIDODRINE HYDROCHLORIDE 5 MG/1
5 TABLET ORAL
Status: DISCONTINUED | OUTPATIENT
Start: 2020-01-01 | End: 2020-01-01 | Stop reason: HOSPADM

## 2020-01-01 RX ORDER — RISPERIDONE 0.5 MG/1
0.5 TABLET, FILM COATED ORAL ONCE
Status: DISCONTINUED | OUTPATIENT
Start: 2020-01-01 | End: 2020-01-01 | Stop reason: HOSPADM

## 2020-01-01 RX ORDER — ERGOCALCIFEROL 1.25 MG/1
50000 CAPSULE ORAL
COMMUNITY

## 2020-01-01 RX ORDER — PANTOPRAZOLE SODIUM 40 MG/10ML
40 INJECTION, POWDER, LYOPHILIZED, FOR SOLUTION INTRAVENOUS DAILY
Status: DISCONTINUED | OUTPATIENT
Start: 2020-01-01 | End: 2020-01-01 | Stop reason: HOSPADM

## 2020-01-01 RX ORDER — POTASSIUM CHLORIDE 20 MEQ/1
40 TABLET, EXTENDED RELEASE ORAL 2 TIMES DAILY WITH MEALS
Status: DISCONTINUED | OUTPATIENT
Start: 2020-01-01 | End: 2020-01-01

## 2020-01-01 RX ORDER — DIPHENHYDRAMINE HCL 25 MG
25 TABLET ORAL EVERY 6 HOURS PRN
DISCHARGE
Start: 2020-01-01 | End: 2020-01-01

## 2020-01-01 RX ORDER — MIRTAZAPINE 15 MG/1
15 TABLET, FILM COATED ORAL NIGHTLY
COMMUNITY
End: 2020-01-01

## 2020-01-01 RX ORDER — SODIUM BICARBONATE 650 MG/1
650 TABLET ORAL DAILY
Status: DISCONTINUED | OUTPATIENT
Start: 2020-01-01 | End: 2020-01-01 | Stop reason: HOSPADM

## 2020-01-01 RX ORDER — ACETAMINOPHEN 325 MG/1
650 TABLET ORAL EVERY 4 HOURS PRN
Status: DISCONTINUED | OUTPATIENT
Start: 2020-01-01 | End: 2020-01-01 | Stop reason: HOSPADM

## 2020-01-01 RX ORDER — SODIUM CHLORIDE 9 MG/ML
10 INJECTION INTRAVENOUS DAILY
Status: DISCONTINUED | OUTPATIENT
Start: 2020-01-01 | End: 2020-01-01 | Stop reason: HOSPADM

## 2020-01-01 RX ORDER — FERROUS SULFATE 325(65) MG
325 TABLET ORAL
Status: DISCONTINUED | OUTPATIENT
Start: 2020-01-01 | End: 2020-01-01

## 2020-01-01 RX ORDER — PETROLATUM 42 G/100G
OINTMENT TOPICAL
Refills: 0 | DISCHARGE
Start: 2020-01-01 | End: 2020-01-01

## 2020-01-01 RX ORDER — FERROUS SULFATE 325(65) MG
325 TABLET ORAL 2 TIMES DAILY
Status: DISCONTINUED | OUTPATIENT
Start: 2020-01-01 | End: 2020-01-01 | Stop reason: HOSPADM

## 2020-01-01 RX ORDER — PANTOPRAZOLE SODIUM 40 MG/1
40 TABLET, DELAYED RELEASE ORAL DAILY
Status: DISCONTINUED | OUTPATIENT
Start: 2020-01-01 | End: 2020-01-01 | Stop reason: HOSPADM

## 2020-01-01 RX ORDER — DIPHENHYDRAMINE HCL 25 MG
25 TABLET ORAL EVERY 6 HOURS PRN
Status: DISCONTINUED | OUTPATIENT
Start: 2020-01-01 | End: 2020-01-01

## 2020-01-01 RX ORDER — POLYETHYLENE GLYCOL 3350 17 G/17G
17 POWDER, FOR SOLUTION ORAL DAILY PRN
Status: DISCONTINUED | OUTPATIENT
Start: 2020-01-01 | End: 2020-01-01 | Stop reason: HOSPADM

## 2020-01-01 RX ORDER — LIDOCAINE HYDROCHLORIDE 10 MG/ML
INJECTION, SOLUTION INFILTRATION; PERINEURAL PRN
Status: DISCONTINUED | OUTPATIENT
Start: 2020-01-01 | End: 2020-01-01 | Stop reason: ALTCHOICE

## 2020-01-01 RX ORDER — BISACODYL 10 MG
10 SUPPOSITORY, RECTAL RECTAL DAILY PRN
Status: DISCONTINUED | OUTPATIENT
Start: 2020-01-01 | End: 2020-01-01 | Stop reason: HOSPADM

## 2020-01-01 RX ORDER — PHENOL 1.4 %
2 AEROSOL, SPRAY (ML) MUCOUS MEMBRANE DAILY
Status: ON HOLD | COMMUNITY
End: 2020-01-01 | Stop reason: HOSPADM

## 2020-01-01 RX ORDER — FERROUS SULFATE 325(65) MG
325 TABLET ORAL 2 TIMES DAILY
Status: ON HOLD | COMMUNITY
End: 2021-01-01 | Stop reason: HOSPADM

## 2020-01-01 RX ORDER — ALLOPURINOL 100 MG/1
100 TABLET ORAL DAILY
Qty: 30 TABLET | Refills: 0 | DISCHARGE
Start: 2020-01-01 | End: 2020-01-01

## 2020-01-01 RX ORDER — POTASSIUM CHLORIDE 7.45 MG/ML
10 INJECTION INTRAVENOUS
Status: DISPENSED | OUTPATIENT
Start: 2020-01-01 | End: 2020-01-01

## 2020-01-01 RX ORDER — ACETAMINOPHEN 325 MG/1
650 TABLET ORAL EVERY 4 HOURS PRN
Status: DISCONTINUED | OUTPATIENT
Start: 2020-01-01 | End: 2020-01-01

## 2020-01-01 RX ORDER — MINERAL OIL 100 G/100G
1 OIL RECTAL ONCE
Status: COMPLETED | OUTPATIENT
Start: 2020-01-01 | End: 2020-01-01

## 2020-01-01 RX ORDER — SENNA AND DOCUSATE SODIUM 50; 8.6 MG/1; MG/1
2 TABLET, FILM COATED ORAL 2 TIMES DAILY
Status: DISCONTINUED | OUTPATIENT
Start: 2020-01-01 | End: 2020-01-01 | Stop reason: HOSPADM

## 2020-01-01 RX ORDER — POTASSIUM CHLORIDE 750 MG/1
10 TABLET, EXTENDED RELEASE ORAL DAILY
Status: ON HOLD | COMMUNITY
End: 2020-01-01 | Stop reason: HOSPADM

## 2020-01-01 RX ORDER — PROMETHAZINE HYDROCHLORIDE 25 MG/1
12.5 TABLET ORAL EVERY 6 HOURS PRN
Status: DISCONTINUED | OUTPATIENT
Start: 2020-01-01 | End: 2020-01-01 | Stop reason: HOSPADM

## 2020-01-01 RX ORDER — FERROUS SULFATE 325(65) MG
325 TABLET ORAL
Status: DISCONTINUED | OUTPATIENT
Start: 2020-01-01 | End: 2020-01-01 | Stop reason: HOSPADM

## 2020-01-01 RX ORDER — SODIUM CHLORIDE 9 MG/ML
INJECTION, SOLUTION INTRAVENOUS CONTINUOUS
Status: DISCONTINUED | OUTPATIENT
Start: 2020-01-01 | End: 2020-01-01

## 2020-01-01 RX ORDER — PETROLATUM 42 G/100G
OINTMENT TOPICAL 2 TIMES DAILY PRN
Status: DISCONTINUED | OUTPATIENT
Start: 2020-01-01 | End: 2020-01-01 | Stop reason: HOSPADM

## 2020-01-01 RX ORDER — SODIUM CHLORIDE 0.9 % (FLUSH) 0.9 %
10 SYRINGE (ML) INJECTION PRN
Status: DISCONTINUED | OUTPATIENT
Start: 2020-01-01 | End: 2020-01-01 | Stop reason: SDUPTHER

## 2020-01-01 RX ORDER — POTASSIUM CHLORIDE 20 MEQ/1
20 TABLET, EXTENDED RELEASE ORAL DAILY
Status: DISCONTINUED | OUTPATIENT
Start: 2020-01-01 | End: 2020-01-01

## 2020-01-01 RX ORDER — PANTOPRAZOLE SODIUM 40 MG/1
40 TABLET, DELAYED RELEASE ORAL
Status: DISCONTINUED | OUTPATIENT
Start: 2020-01-01 | End: 2020-01-01

## 2020-01-01 RX ORDER — PETROLATUM 42 G/100G
OINTMENT TOPICAL
Refills: 0 | DISCHARGE
Start: 2020-01-01 | End: 2021-01-01

## 2020-01-01 RX ORDER — RISPERIDONE 1 MG/1
1 TABLET, FILM COATED ORAL DAILY
Status: DISCONTINUED | OUTPATIENT
Start: 2020-01-01 | End: 2020-01-01 | Stop reason: HOSPADM

## 2020-01-01 RX ORDER — SODIUM CHLORIDE 0.9 % (FLUSH) 0.9 %
10 SYRINGE (ML) INJECTION EVERY 12 HOURS SCHEDULED
Status: DISCONTINUED | OUTPATIENT
Start: 2020-01-01 | End: 2020-01-01 | Stop reason: HOSPADM

## 2020-01-01 RX ORDER — METOPROLOL SUCCINATE 50 MG/1
50 TABLET, EXTENDED RELEASE ORAL DAILY
Status: DISCONTINUED | OUTPATIENT
Start: 2020-01-01 | End: 2020-01-01

## 2020-01-01 RX ORDER — CEFAZOLIN SODIUM 1 G/3ML
1 INJECTION, POWDER, FOR SOLUTION INTRAMUSCULAR; INTRAVENOUS ONCE
Status: DISCONTINUED | OUTPATIENT
Start: 2020-01-01 | End: 2020-01-01 | Stop reason: SDUPTHER

## 2020-01-01 RX ORDER — BISACODYL 10 MG
10 SUPPOSITORY, RECTAL RECTAL ONCE
Status: COMPLETED | OUTPATIENT
Start: 2020-01-01 | End: 2020-01-01

## 2020-01-01 RX ORDER — ALLOPURINOL 100 MG/1
100 TABLET ORAL DAILY
Status: DISCONTINUED | OUTPATIENT
Start: 2020-01-01 | End: 2020-01-01 | Stop reason: HOSPADM

## 2020-01-01 RX ORDER — DIPHENHYDRAMINE HCL 25 MG
25 TABLET ORAL EVERY 6 HOURS PRN
Status: DISCONTINUED | OUTPATIENT
Start: 2020-01-01 | End: 2020-01-01 | Stop reason: HOSPADM

## 2020-01-01 RX ORDER — MIDODRINE HYDROCHLORIDE 5 MG/1
5 TABLET ORAL
Qty: 90 TABLET | Refills: 0 | DISCHARGE
Start: 2020-01-01 | End: 2020-01-01

## 2020-01-01 RX ORDER — HYDROCODONE BITARTRATE AND ACETAMINOPHEN 5; 325 MG/1; MG/1
1 TABLET ORAL EVERY 12 HOURS PRN
Status: ON HOLD | COMMUNITY
End: 2020-01-01 | Stop reason: HOSPADM

## 2020-01-01 RX ORDER — FERROUS SULFATE 325(65) MG
325 TABLET ORAL
Qty: 30 TABLET | Refills: 0 | DISCHARGE
Start: 2020-01-01 | End: 2020-01-01

## 2020-01-01 RX ADMIN — HEPARIN SODIUM 5000 UNITS: 10000 INJECTION INTRAVENOUS; SUBCUTANEOUS at 14:30

## 2020-01-01 RX ADMIN — SODIUM CHLORIDE, PRESERVATIVE FREE 10 ML: 5 INJECTION INTRAVENOUS at 20:16

## 2020-01-01 RX ADMIN — METOPROLOL SUCCINATE 50 MG: 50 TABLET, EXTENDED RELEASE ORAL at 12:04

## 2020-01-01 RX ADMIN — CEFAZOLIN 1 G: 1 INJECTION, POWDER, FOR SOLUTION INTRAMUSCULAR; INTRAVENOUS at 07:05

## 2020-01-01 RX ADMIN — FERROUS SULFATE TAB 325 MG (65 MG ELEMENTAL FE) 325 MG: 325 (65 FE) TAB at 09:25

## 2020-01-01 RX ADMIN — CALCIUM GLUCONATE 1 G: 98 INJECTION, SOLUTION INTRAVENOUS at 17:54

## 2020-01-01 RX ADMIN — POTASSIUM CHLORIDE 20 MEQ: 1500 TABLET, EXTENDED RELEASE ORAL at 09:54

## 2020-01-01 RX ADMIN — FERROUS SULFATE TAB 325 MG (65 MG ELEMENTAL FE) 325 MG: 325 (65 FE) TAB at 09:53

## 2020-01-01 RX ADMIN — ENOXAPARIN SODIUM 40 MG: 40 INJECTION SUBCUTANEOUS at 09:56

## 2020-01-01 RX ADMIN — MIDODRINE HYDROCHLORIDE 5 MG: 5 TABLET ORAL at 16:17

## 2020-01-01 RX ADMIN — PANTOPRAZOLE SODIUM 40 MG: 40 TABLET, DELAYED RELEASE ORAL at 05:50

## 2020-01-01 RX ADMIN — ALLOPURINOL 100 MG: 100 TABLET ORAL at 10:06

## 2020-01-01 RX ADMIN — METOPROLOL SUCCINATE 50 MG: 50 TABLET, EXTENDED RELEASE ORAL at 11:01

## 2020-01-01 RX ADMIN — HEPARIN SODIUM 5000 UNITS: 10000 INJECTION INTRAVENOUS; SUBCUTANEOUS at 14:10

## 2020-01-01 RX ADMIN — SODIUM CHLORIDE: 9 INJECTION, SOLUTION INTRAVENOUS at 14:56

## 2020-01-01 RX ADMIN — MAGNESIUM SULFATE 2 G: 2 INJECTION INTRAVENOUS at 14:11

## 2020-01-01 RX ADMIN — FERROUS SULFATE TAB 325 MG (65 MG ELEMENTAL FE) 325 MG: 325 (65 FE) TAB at 10:06

## 2020-01-01 RX ADMIN — SODIUM CHLORIDE 500 ML: 9 INJECTION, SOLUTION INTRAVENOUS at 10:36

## 2020-01-01 RX ADMIN — PANTOPRAZOLE SODIUM 40 MG: 40 TABLET, DELAYED RELEASE ORAL at 08:49

## 2020-01-01 RX ADMIN — SODIUM CHLORIDE, POTASSIUM CHLORIDE, SODIUM LACTATE AND CALCIUM CHLORIDE 1000 ML: 600; 310; 30; 20 INJECTION, SOLUTION INTRAVENOUS at 14:43

## 2020-01-01 RX ADMIN — HEPARIN SODIUM 5000 UNITS: 10000 INJECTION INTRAVENOUS; SUBCUTANEOUS at 05:37

## 2020-01-01 RX ADMIN — POTASSIUM CHLORIDE 10 MEQ: 10 INJECTION, SOLUTION INTRAVENOUS at 16:21

## 2020-01-01 RX ADMIN — RISPERIDONE 1 MG: 1 TABLET, FILM COATED ORAL at 11:33

## 2020-01-01 RX ADMIN — FERROUS SULFATE TAB 325 MG (65 MG ELEMENTAL FE) 325 MG: 325 (65 FE) TAB at 12:04

## 2020-01-01 RX ADMIN — MIDODRINE HYDROCHLORIDE 5 MG: 5 TABLET ORAL at 10:01

## 2020-01-01 RX ADMIN — SODIUM CHLORIDE: 4.5 INJECTION, SOLUTION INTRAVENOUS at 13:03

## 2020-01-01 RX ADMIN — METOPROLOL SUCCINATE 50 MG: 50 TABLET, EXTENDED RELEASE ORAL at 09:25

## 2020-01-01 RX ADMIN — FERROUS SULFATE TAB 325 MG (65 MG ELEMENTAL FE) 325 MG: 325 (65 FE) TAB at 10:01

## 2020-01-01 RX ADMIN — SODIUM CHLORIDE: 4.5 INJECTION, SOLUTION INTRAVENOUS at 03:22

## 2020-01-01 RX ADMIN — METOPROLOL TARTRATE 25 MG: 25 TABLET, FILM COATED ORAL at 09:39

## 2020-01-01 RX ADMIN — METOPROLOL TARTRATE 25 MG: 25 TABLET, FILM COATED ORAL at 11:32

## 2020-01-01 RX ADMIN — HYDROCODONE BITARTRATE AND ACETAMINOPHEN 1 TABLET: 5; 325 TABLET ORAL at 13:20

## 2020-01-01 RX ADMIN — HEPARIN SODIUM 5000 UNITS: 10000 INJECTION INTRAVENOUS; SUBCUTANEOUS at 13:53

## 2020-01-01 RX ADMIN — POTASSIUM CHLORIDE 40 MEQ: 1500 TABLET, EXTENDED RELEASE ORAL at 18:23

## 2020-01-01 RX ADMIN — DOCUSATE SODIUM 50 MG AND SENNOSIDES 8.6 MG 2 TABLET: 8.6; 5 TABLET, FILM COATED ORAL at 18:02

## 2020-01-01 RX ADMIN — METOPROLOL SUCCINATE 50 MG: 50 TABLET, EXTENDED RELEASE ORAL at 08:02

## 2020-01-01 RX ADMIN — FERROUS SULFATE TAB 325 MG (65 MG ELEMENTAL FE) 325 MG: 325 (65 FE) TAB at 21:56

## 2020-01-01 RX ADMIN — MIDODRINE HYDROCHLORIDE 5 MG: 5 TABLET ORAL at 13:03

## 2020-01-01 RX ADMIN — SODIUM CHLORIDE, PRESERVATIVE FREE 10 ML: 5 INJECTION INTRAVENOUS at 09:25

## 2020-01-01 RX ADMIN — FERROUS SULFATE TAB 325 MG (65 MG ELEMENTAL FE) 325 MG: 325 (65 FE) TAB at 08:02

## 2020-01-01 RX ADMIN — POTASSIUM CHLORIDE AND SODIUM CHLORIDE: 900; 150 INJECTION, SOLUTION INTRAVENOUS at 05:39

## 2020-01-01 RX ADMIN — SODIUM BICARBONATE 650 MG: 650 TABLET ORAL at 11:33

## 2020-01-01 RX ADMIN — RISPERIDONE 0.5 MG: 0.5 TABLET, FILM COATED ORAL at 09:21

## 2020-01-01 RX ADMIN — MIDODRINE HYDROCHLORIDE 5 MG: 5 TABLET ORAL at 13:57

## 2020-01-01 RX ADMIN — SODIUM CHLORIDE: 9 INJECTION, SOLUTION INTRAVENOUS at 03:46

## 2020-01-01 RX ADMIN — METOPROLOL SUCCINATE 50 MG: 50 TABLET, EXTENDED RELEASE ORAL at 11:48

## 2020-01-01 RX ADMIN — MAGNESIUM GLUCONATE 500 MG ORAL TABLET 400 MG: 500 TABLET ORAL at 09:39

## 2020-01-01 RX ADMIN — DOCUSATE SODIUM 50MG AND SENNOSIDES 8.6MG 2 TABLET: 8.6; 5 TABLET, FILM COATED ORAL at 10:00

## 2020-01-01 RX ADMIN — PANTOPRAZOLE SODIUM 40 MG: 40 TABLET, DELAYED RELEASE ORAL at 06:15

## 2020-01-01 RX ADMIN — POTASSIUM PHOSPHATE, MONOBASIC AND POTASSIUM PHOSPHATE, DIBASIC 30 MMOL: 224; 236 INJECTION, SOLUTION, CONCENTRATE INTRAVENOUS at 16:09

## 2020-01-01 RX ADMIN — PANTOPRAZOLE SODIUM 40 MG: 40 TABLET, DELAYED RELEASE ORAL at 09:21

## 2020-01-01 RX ADMIN — HYDROCODONE BITARTRATE AND ACETAMINOPHEN 1 TABLET: 5; 325 TABLET ORAL at 03:02

## 2020-01-01 RX ADMIN — MIDODRINE HYDROCHLORIDE 5 MG: 5 TABLET ORAL at 08:49

## 2020-01-01 RX ADMIN — POTASSIUM CHLORIDE 10 MEQ: 10 INJECTION, SOLUTION INTRAVENOUS at 18:18

## 2020-01-01 RX ADMIN — MIDODRINE HYDROCHLORIDE 5 MG: 5 TABLET ORAL at 11:53

## 2020-01-01 RX ADMIN — POTASSIUM BICARBONATE 40 MEQ: 782 TABLET, EFFERVESCENT ORAL at 14:47

## 2020-01-01 RX ADMIN — FERROUS SULFATE TAB 325 MG (65 MG ELEMENTAL FE) 325 MG: 325 (65 FE) TAB at 09:21

## 2020-01-01 RX ADMIN — SODIUM CHLORIDE: 9 INJECTION, SOLUTION INTRAVENOUS at 22:07

## 2020-01-01 RX ADMIN — PANTOPRAZOLE SODIUM 40 MG: 40 TABLET, DELAYED RELEASE ORAL at 06:07

## 2020-01-01 RX ADMIN — POTASSIUM CHLORIDE 10 MEQ: 10 INJECTION, SOLUTION INTRAVENOUS at 10:19

## 2020-01-01 RX ADMIN — SKIN PROTECTANT: 44 OINTMENT TOPICAL at 17:00

## 2020-01-01 RX ADMIN — RISPERIDONE 0.5 MG: 0.5 TABLET, FILM COATED ORAL at 11:12

## 2020-01-01 RX ADMIN — HEPARIN SODIUM 5000 UNITS: 10000 INJECTION INTRAVENOUS; SUBCUTANEOUS at 06:15

## 2020-01-01 RX ADMIN — POTASSIUM PHOSPHATE, MONOBASIC AND POTASSIUM PHOSPHATE, DIBASIC 15 MMOL: 224; 236 INJECTION, SOLUTION, CONCENTRATE INTRAVENOUS at 20:17

## 2020-01-01 RX ADMIN — ALLOPURINOL 100 MG: 100 TABLET ORAL at 09:39

## 2020-01-01 RX ADMIN — DOCUSATE SODIUM 50 MG AND SENNOSIDES 8.6 MG 2 TABLET: 8.6; 5 TABLET, FILM COATED ORAL at 21:23

## 2020-01-01 RX ADMIN — SODIUM CHLORIDE: 9 INJECTION, SOLUTION INTRAVENOUS at 17:06

## 2020-01-01 RX ADMIN — MAGNESIUM GLUCONATE 500 MG ORAL TABLET 400 MG: 500 TABLET ORAL at 21:23

## 2020-01-01 RX ADMIN — RISPERIDONE 1 MG: 1 TABLET, FILM COATED ORAL at 09:39

## 2020-01-01 RX ADMIN — RISPERIDONE 0.5 MG: 0.5 TABLET, FILM COATED ORAL at 21:50

## 2020-01-01 RX ADMIN — ALLOPURINOL 100 MG: 100 TABLET ORAL at 08:02

## 2020-01-01 RX ADMIN — ALLOPURINOL 100 MG: 100 TABLET ORAL at 10:01

## 2020-01-01 RX ADMIN — PANTOPRAZOLE SODIUM 40 MG: 40 TABLET, DELAYED RELEASE ORAL at 05:43

## 2020-01-01 RX ADMIN — ALLOPURINOL 100 MG: 100 TABLET ORAL at 11:52

## 2020-01-01 RX ADMIN — HEPARIN SODIUM 5000 UNITS: 10000 INJECTION INTRAVENOUS; SUBCUTANEOUS at 22:15

## 2020-01-01 RX ADMIN — SODIUM BICARBONATE: 84 INJECTION, SOLUTION INTRAVENOUS at 14:10

## 2020-01-01 RX ADMIN — ENOXAPARIN SODIUM 40 MG: 40 INJECTION SUBCUTANEOUS at 09:21

## 2020-01-01 RX ADMIN — HEPARIN SODIUM 5000 UNITS: 10000 INJECTION INTRAVENOUS; SUBCUTANEOUS at 22:09

## 2020-01-01 RX ADMIN — MINERAL OIL 1 ENEMA: 100 ENEMA RECTAL at 18:24

## 2020-01-01 RX ADMIN — HEPARIN SODIUM 5000 UNITS: 10000 INJECTION INTRAVENOUS; SUBCUTANEOUS at 22:50

## 2020-01-01 RX ADMIN — SODIUM CHLORIDE 10 ML: 9 INJECTION, SOLUTION INTRAMUSCULAR; INTRAVENOUS; SUBCUTANEOUS at 09:39

## 2020-01-01 RX ADMIN — MIDODRINE HYDROCHLORIDE 5 MG: 5 TABLET ORAL at 16:50

## 2020-01-01 RX ADMIN — FERROUS SULFATE TAB 325 MG (65 MG ELEMENTAL FE) 325 MG: 325 (65 FE) TAB at 09:40

## 2020-01-01 RX ADMIN — DIATRIZOATE MEGLUMINE AND DIATRIZOATE SODIUM 30 ML: 600; 100 SOLUTION ORAL; RECTAL at 22:14

## 2020-01-01 RX ADMIN — METOPROLOL SUCCINATE 50 MG: 50 TABLET, EXTENDED RELEASE ORAL at 09:21

## 2020-01-01 RX ADMIN — METOPROLOL TARTRATE 25 MG: 25 TABLET, FILM COATED ORAL at 21:24

## 2020-01-01 RX ADMIN — MIDODRINE HYDROCHLORIDE 5 MG: 5 TABLET ORAL at 09:39

## 2020-01-01 RX ADMIN — PHENYLEPHRINE HYDROCHLORIDE 100 MCG: 10 INJECTION INTRAVENOUS at 07:10

## 2020-01-01 RX ADMIN — PANTOPRAZOLE SODIUM 40 MG: 40 TABLET, DELAYED RELEASE ORAL at 09:55

## 2020-01-01 RX ADMIN — ENOXAPARIN SODIUM 40 MG: 40 INJECTION SUBCUTANEOUS at 17:06

## 2020-01-01 RX ADMIN — RISPERIDONE 0.5 MG: 0.5 TABLET, FILM COATED ORAL at 21:27

## 2020-01-01 RX ADMIN — MIDODRINE HYDROCHLORIDE 5 MG: 5 TABLET ORAL at 08:02

## 2020-01-01 RX ADMIN — SODIUM BICARBONATE 650 MG: 650 TABLET ORAL at 11:00

## 2020-01-01 RX ADMIN — PANTOPRAZOLE SODIUM 40 MG: 40 INJECTION, POWDER, FOR SOLUTION INTRAVENOUS at 11:32

## 2020-01-01 RX ADMIN — DOCUSATE SODIUM 50MG AND SENNOSIDES 8.6MG 2 TABLET: 8.6; 5 TABLET, FILM COATED ORAL at 17:51

## 2020-01-01 RX ADMIN — POTASSIUM BICARBONATE 20 MEQ: 782 TABLET, EFFERVESCENT ORAL at 21:24

## 2020-01-01 RX ADMIN — SODIUM CHLORIDE: 9 INJECTION, SOLUTION INTRAVENOUS at 16:07

## 2020-01-01 RX ADMIN — SODIUM CHLORIDE: 4.5 INJECTION, SOLUTION INTRAVENOUS at 04:13

## 2020-01-01 RX ADMIN — MAGNESIUM SULFATE HEPTAHYDRATE 1 G: 1 INJECTION, SOLUTION INTRAVENOUS at 02:36

## 2020-01-01 RX ADMIN — FERROUS SULFATE TAB 325 MG (65 MG ELEMENTAL FE) 325 MG: 325 (65 FE) TAB at 21:27

## 2020-01-01 RX ADMIN — PANTOPRAZOLE SODIUM 40 MG: 40 TABLET, DELAYED RELEASE ORAL at 09:25

## 2020-01-01 RX ADMIN — PANTOPRAZOLE SODIUM 40 MG: 40 TABLET, DELAYED RELEASE ORAL at 05:37

## 2020-01-01 RX ADMIN — RISPERIDONE 0.5 MG: 0.5 TABLET, FILM COATED ORAL at 09:25

## 2020-01-01 RX ADMIN — SODIUM CHLORIDE 10 ML: 9 INJECTION, SOLUTION INTRAMUSCULAR; INTRAVENOUS; SUBCUTANEOUS at 11:32

## 2020-01-01 RX ADMIN — MIDODRINE HYDROCHLORIDE 5 MG: 5 TABLET ORAL at 13:19

## 2020-01-01 RX ADMIN — CEFTRIAXONE SODIUM 1 G: 1 INJECTION, POWDER, FOR SOLUTION INTRAMUSCULAR; INTRAVENOUS at 17:12

## 2020-01-01 RX ADMIN — SODIUM CHLORIDE: 4.5 INJECTION, SOLUTION INTRAVENOUS at 04:27

## 2020-01-01 RX ADMIN — MIDODRINE HYDROCHLORIDE 5 MG: 5 TABLET ORAL at 12:00

## 2020-01-01 RX ADMIN — HEPARIN SODIUM 5000 UNITS: 10000 INJECTION INTRAVENOUS; SUBCUTANEOUS at 01:01

## 2020-01-01 RX ADMIN — RISPERIDONE 1 MG: 1 TABLET, FILM COATED ORAL at 11:53

## 2020-01-01 RX ADMIN — MIDODRINE HYDROCHLORIDE 5 MG: 5 TABLET ORAL at 17:51

## 2020-01-01 RX ADMIN — FERROUS SULFATE TAB 325 MG (65 MG ELEMENTAL FE) 325 MG: 325 (65 FE) TAB at 09:55

## 2020-01-01 RX ADMIN — PROPOFOL 80 MG: 10 INJECTION, EMULSION INTRAVENOUS at 07:04

## 2020-01-01 RX ADMIN — DOCUSATE SODIUM 50 MG AND SENNOSIDES 8.6 MG 2 TABLET: 8.6; 5 TABLET, FILM COATED ORAL at 09:39

## 2020-01-01 RX ADMIN — HEPARIN SODIUM 5000 UNITS: 10000 INJECTION INTRAVENOUS; SUBCUTANEOUS at 06:02

## 2020-01-01 RX ADMIN — BISACODYL 10 MG: 10 SUPPOSITORY RECTAL at 22:00

## 2020-01-01 RX ADMIN — FERROUS SULFATE TAB 325 MG (65 MG ELEMENTAL FE) 325 MG: 325 (65 FE) TAB at 11:32

## 2020-01-01 RX ADMIN — HEPARIN SODIUM 5000 UNITS: 10000 INJECTION INTRAVENOUS; SUBCUTANEOUS at 05:50

## 2020-01-01 RX ADMIN — MIDODRINE HYDROCHLORIDE 5 MG: 5 TABLET ORAL at 20:00

## 2020-01-01 RX ADMIN — DOCUSATE SODIUM 50MG AND SENNOSIDES 8.6MG 2 TABLET: 8.6; 5 TABLET, FILM COATED ORAL at 16:17

## 2020-01-01 RX ADMIN — POTASSIUM BICARBONATE 20 MEQ: 782 TABLET, EFFERVESCENT ORAL at 22:05

## 2020-01-01 RX ADMIN — PANTOPRAZOLE SODIUM 40 MG: 40 INJECTION, POWDER, FOR SOLUTION INTRAVENOUS at 09:39

## 2020-01-01 RX ADMIN — DOCUSATE SODIUM 50 MG AND SENNOSIDES 8.6 MG 2 TABLET: 8.6; 5 TABLET, FILM COATED ORAL at 11:32

## 2020-01-01 RX ADMIN — POTASSIUM BICARBONATE 20 MEQ: 782 TABLET, EFFERVESCENT ORAL at 08:49

## 2020-01-01 RX ADMIN — ALLOPURINOL 100 MG: 100 TABLET ORAL at 11:33

## 2020-01-01 RX ADMIN — SODIUM CHLORIDE: 4.5 INJECTION, SOLUTION INTRAVENOUS at 15:45

## 2020-01-01 RX ADMIN — HEPARIN SODIUM 5000 UNITS: 10000 INJECTION INTRAVENOUS; SUBCUTANEOUS at 14:15

## 2020-01-01 RX ADMIN — ALLOPURINOL 100 MG: 100 TABLET ORAL at 08:49

## 2020-01-01 RX ADMIN — CEFTRIAXONE SODIUM 1 G: 1 INJECTION, POWDER, FOR SOLUTION INTRAMUSCULAR; INTRAVENOUS at 15:58

## 2020-01-01 RX ADMIN — METOPROLOL SUCCINATE 50 MG: 50 TABLET, EXTENDED RELEASE ORAL at 08:49

## 2020-01-01 RX ADMIN — FERROUS SULFATE TAB 325 MG (65 MG ELEMENTAL FE) 325 MG: 325 (65 FE) TAB at 08:49

## 2020-01-01 RX ADMIN — METOPROLOL SUCCINATE 50 MG: 50 TABLET, EXTENDED RELEASE ORAL at 09:55

## 2020-01-01 RX ADMIN — POTASSIUM BICARBONATE 20 MEQ: 782 TABLET, EFFERVESCENT ORAL at 09:39

## 2020-01-01 RX ADMIN — RISPERIDONE 1 MG: 1 TABLET, FILM COATED ORAL at 10:01

## 2020-01-01 RX ADMIN — POTASSIUM BICARBONATE 40 MEQ: 782 TABLET, EFFERVESCENT ORAL at 21:50

## 2020-01-01 RX ADMIN — MAGNESIUM GLUCONATE 500 MG ORAL TABLET 400 MG: 500 TABLET ORAL at 17:22

## 2020-01-01 RX ADMIN — ALLOPURINOL 100 MG: 100 TABLET ORAL at 09:53

## 2020-01-01 RX ADMIN — HEPARIN SODIUM 5000 UNITS: 10000 INJECTION INTRAVENOUS; SUBCUTANEOUS at 13:20

## 2020-01-01 RX ADMIN — ENOXAPARIN SODIUM 40 MG: 40 INJECTION SUBCUTANEOUS at 09:25

## 2020-01-01 RX ADMIN — PANTOPRAZOLE SODIUM 40 MG: 40 TABLET, DELAYED RELEASE ORAL at 12:01

## 2020-01-01 RX ADMIN — MIDODRINE HYDROCHLORIDE 5 MG: 5 TABLET ORAL at 11:33

## 2020-01-01 RX ADMIN — RISPERIDONE 0.5 MG: 0.5 TABLET, FILM COATED ORAL at 09:55

## 2020-01-01 RX ADMIN — CEFTRIAXONE SODIUM 1 G: 1 INJECTION, POWDER, FOR SOLUTION INTRAMUSCULAR; INTRAVENOUS at 16:07

## 2020-01-01 RX ADMIN — RISPERIDONE 0.5 MG: 0.5 TABLET, FILM COATED ORAL at 08:49

## 2020-01-01 RX ADMIN — POTASSIUM CHLORIDE AND SODIUM CHLORIDE: 900; 150 INJECTION, SOLUTION INTRAVENOUS at 09:53

## 2020-01-01 RX ADMIN — METOPROLOL SUCCINATE 50 MG: 50 TABLET, EXTENDED RELEASE ORAL at 09:53

## 2020-01-01 RX ADMIN — HEPARIN SODIUM 5000 UNITS: 10000 INJECTION INTRAVENOUS; SUBCUTANEOUS at 22:08

## 2020-01-01 RX ADMIN — SODIUM CHLORIDE: 9 INJECTION, SOLUTION INTRAVENOUS at 07:00

## 2020-01-01 RX ADMIN — MAGNESIUM SULFATE HEPTAHYDRATE 4 G: 40 INJECTION, SOLUTION INTRAVENOUS at 13:03

## 2020-01-01 RX ADMIN — DOCUSATE SODIUM 50MG AND SENNOSIDES 8.6MG 2 TABLET: 8.6; 5 TABLET, FILM COATED ORAL at 12:04

## 2020-01-01 RX ADMIN — SODIUM BICARBONATE: 84 INJECTION, SOLUTION INTRAVENOUS at 22:50

## 2020-01-01 RX ADMIN — MIDODRINE HYDROCHLORIDE 5 MG: 5 TABLET ORAL at 17:22

## 2020-01-01 RX ADMIN — HEPARIN SODIUM 5000 UNITS: 10000 INJECTION INTRAVENOUS; SUBCUTANEOUS at 05:05

## 2020-01-01 RX ADMIN — HEPARIN SODIUM 5000 UNITS: 10000 INJECTION INTRAVENOUS; SUBCUTANEOUS at 21:20

## 2020-01-01 ASSESSMENT — PAIN SCALES - PAIN ASSESSMENT IN ADVANCED DEMENTIA (PAINAD)
BODYLANGUAGE: 0
NEGVOCALIZATION: 0
BREATHING: 0
TOTALSCORE: 0
BODYLANGUAGE: 0
FACIALEXPRESSION: 0
CONSOLABILITY: 0
BREATHING: 0
TOTALSCORE: 0
CONSOLABILITY: 0
CONSOLABILITY: 0
BODYLANGUAGE: 0
BODYLANGUAGE: 0
FACIALEXPRESSION: 0
NEGVOCALIZATION: 0
BREATHING: 0
BODYLANGUAGE: 0
CONSOLABILITY: 0
NEGVOCALIZATION: 0
BODYLANGUAGE: 0
FACIALEXPRESSION: 0
TOTALSCORE: 0
CONSOLABILITY: 0
TOTALSCORE: 0
NEGVOCALIZATION: 0
FACIALEXPRESSION: 0
BREATHING: 0
FACIALEXPRESSION: 0
TOTALSCORE: 0
FACIALEXPRESSION: 0
CONSOLABILITY: 0
CONSOLABILITY: 0
BREATHING: 0
NEGVOCALIZATION: 0
BREATHING: 0
TOTALSCORE: 0
NEGVOCALIZATION: 0
FACIALEXPRESSION: 0
NEGVOCALIZATION: 0
TOTALSCORE: 0
BREATHING: 0
BODYLANGUAGE: 0

## 2020-01-01 ASSESSMENT — PAIN SCALES - GENERAL
PAINLEVEL_OUTOF10: 0
PAINLEVEL_OUTOF10: 3
PAINLEVEL_OUTOF10: 0
PAINLEVEL_OUTOF10: 3
PAINLEVEL_OUTOF10: 0

## 2020-03-10 ENCOUNTER — HOSPITAL ENCOUNTER (OUTPATIENT)
Age: 85
Discharge: HOME | End: 2020-03-12
Payer: MEDICARE

## 2020-03-10 LAB
ANION GAP SERPL CALCULATED.3IONS-SCNC: 16 MMOL/L (ref 7–16)
BUN BLDV-MCNC: 29 MG/DL (ref 8–23)
CALCIUM SERPL-MCNC: 9.1 MG/DL (ref 8.6–10.2)
CHLORIDE BLD-SCNC: 98 MMOL/L (ref 98–107)
CO2: 24 MMOL/L (ref 22–29)
CREAT SERPL-MCNC: 0.7 MG/DL (ref 0.5–1)
GFR AFRICAN AMERICAN: >60
GFR NON-AFRICAN AMERICAN: >60 ML/MIN/1.73
GLUCOSE BLD-MCNC: 81 MG/DL (ref 74–99)
POTASSIUM SERPL-SCNC: 3.8 MMOL/L (ref 3.5–5)
SODIUM BLD-SCNC: 138 MMOL/L (ref 132–146)

## 2020-03-10 PROCEDURE — 36415 COLL VENOUS BLD VENIPUNCTURE: CPT

## 2020-03-10 PROCEDURE — 80048 BASIC METABOLIC PNL TOTAL CA: CPT

## 2020-04-14 ENCOUNTER — HOSPITAL ENCOUNTER (OUTPATIENT)
Age: 85
Discharge: HOME | End: 2020-04-16
Payer: MEDICARE

## 2020-04-14 PROCEDURE — 85025 COMPLETE CBC W/AUTO DIFF WBC: CPT

## 2020-04-14 PROCEDURE — 36415 COLL VENOUS BLD VENIPUNCTURE: CPT

## 2020-04-14 PROCEDURE — 80053 COMPREHEN METABOLIC PANEL: CPT

## 2020-05-19 ENCOUNTER — HOSPITAL ENCOUNTER (OUTPATIENT)
Age: 85
Discharge: HOME | End: 2020-05-21
Payer: MEDICARE

## 2020-05-19 LAB
ANION GAP SERPL CALCULATED.3IONS-SCNC: 13 MMOL/L (ref 7–16)
BUN BLDV-MCNC: 25 MG/DL (ref 8–23)
CALCIUM SERPL-MCNC: 9.1 MG/DL (ref 8.6–10.2)
CHLORIDE BLD-SCNC: 97 MMOL/L (ref 98–107)
CO2: 26 MMOL/L (ref 22–29)
CREAT SERPL-MCNC: 0.7 MG/DL (ref 0.5–1)
GFR AFRICAN AMERICAN: >60
GFR NON-AFRICAN AMERICAN: >60 ML/MIN/1.73
GLUCOSE BLD-MCNC: 102 MG/DL (ref 74–99)
POTASSIUM SERPL-SCNC: 3.6 MMOL/L (ref 3.5–5)
SODIUM BLD-SCNC: 136 MMOL/L (ref 132–146)

## 2020-05-19 PROCEDURE — 80048 BASIC METABOLIC PNL TOTAL CA: CPT

## 2020-05-19 PROCEDURE — 36415 COLL VENOUS BLD VENIPUNCTURE: CPT

## 2020-05-22 ENCOUNTER — HOSPITAL ENCOUNTER (OUTPATIENT)
Age: 85
Discharge: HOME | End: 2020-05-24
Payer: MEDICARE

## 2020-05-22 LAB
BACTERIA: ABNORMAL /HPF
BILIRUBIN URINE: NEGATIVE
BLOOD, URINE: ABNORMAL
CLARITY: ABNORMAL
COLOR: YELLOW
GLUCOSE URINE: NEGATIVE MG/DL
KETONES, URINE: NEGATIVE MG/DL
LEUKOCYTE ESTERASE, URINE: ABNORMAL
NITRITE, URINE: NEGATIVE
PH UA: 6.5 (ref 5–9)
PROTEIN UA: NEGATIVE MG/DL
RBC UA: ABNORMAL /HPF (ref 0–2)
SPECIFIC GRAVITY UA: 1.02 (ref 1–1.03)
UROBILINOGEN, URINE: 1 E.U./DL
WBC UA: ABNORMAL /HPF (ref 0–5)

## 2020-05-22 PROCEDURE — 81001 URINALYSIS AUTO W/SCOPE: CPT

## 2020-05-22 PROCEDURE — 87077 CULTURE AEROBIC IDENTIFY: CPT

## 2020-05-22 PROCEDURE — 87186 SC STD MICRODIL/AGAR DIL: CPT

## 2020-05-22 PROCEDURE — 87088 URINE BACTERIA CULTURE: CPT

## 2020-05-24 LAB
ORGANISM: ABNORMAL
URINE CULTURE, ROUTINE: ABNORMAL

## 2020-05-27 ENCOUNTER — HOSPITAL ENCOUNTER (OUTPATIENT)
Age: 85
Discharge: HOME | End: 2020-05-29
Payer: MEDICARE

## 2020-05-27 PROCEDURE — U0003 INFECTIOUS AGENT DETECTION BY NUCLEIC ACID (DNA OR RNA); SEVERE ACUTE RESPIRATORY SYNDROME CORONAVIRUS 2 (SARS-COV-2) (CORONAVIRUS DISEASE [COVID-19]), AMPLIFIED PROBE TECHNIQUE, MAKING USE OF HIGH THROUGHPUT TECHNOLOGIES AS DESCRIBED BY CMS-2020-01-R: HCPCS

## 2020-05-28 ENCOUNTER — HOSPITAL ENCOUNTER (OUTPATIENT)
Age: 85
Discharge: HOME | End: 2020-05-30
Payer: MEDICARE

## 2020-05-28 LAB
SARS-COV-2: DETECTED
SOURCE: ABNORMAL

## 2020-05-28 PROCEDURE — U0003 INFECTIOUS AGENT DETECTION BY NUCLEIC ACID (DNA OR RNA); SEVERE ACUTE RESPIRATORY SYNDROME CORONAVIRUS 2 (SARS-COV-2) (CORONAVIRUS DISEASE [COVID-19]), AMPLIFIED PROBE TECHNIQUE, MAKING USE OF HIGH THROUGHPUT TECHNOLOGIES AS DESCRIBED BY CMS-2020-01-R: HCPCS

## 2020-05-31 LAB
SARS-COV-2: DETECTED
SOURCE: ABNORMAL

## 2020-06-05 ENCOUNTER — HOSPITAL ENCOUNTER (OUTPATIENT)
Age: 85
Discharge: HOME | End: 2020-06-07
Payer: MEDICARE

## 2020-06-05 LAB
ALBUMIN SERPL-MCNC: 3.7 G/DL (ref 3.5–5.2)
ALP BLD-CCNC: 240 U/L (ref 35–104)
ALT SERPL-CCNC: 66 U/L (ref 0–32)
AMMONIA: 43 UMOL/L (ref 11–51)
ANION GAP SERPL CALCULATED.3IONS-SCNC: 15 MMOL/L (ref 7–16)
AST SERPL-CCNC: 100 U/L (ref 0–31)
BASOPHILS ABSOLUTE: 0.03 E9/L (ref 0–0.2)
BASOPHILS RELATIVE PERCENT: 0.5 % (ref 0–2)
BILIRUB SERPL-MCNC: 0.8 MG/DL (ref 0–1.2)
BUN BLDV-MCNC: 20 MG/DL (ref 8–23)
CALCIUM SERPL-MCNC: 9.3 MG/DL (ref 8.6–10.2)
CHLORIDE BLD-SCNC: 98 MMOL/L (ref 98–107)
CO2: 27 MMOL/L (ref 22–29)
CREAT SERPL-MCNC: 0.6 MG/DL (ref 0.5–1)
EOSINOPHILS ABSOLUTE: 0.06 E9/L (ref 0.05–0.5)
EOSINOPHILS RELATIVE PERCENT: 0.9 % (ref 0–6)
GFR AFRICAN AMERICAN: >60
GFR NON-AFRICAN AMERICAN: >60 ML/MIN/1.73
GLUCOSE BLD-MCNC: 98 MG/DL (ref 74–99)
HCT VFR BLD CALC: 39.8 % (ref 34–48)
HEMOGLOBIN: 13.3 G/DL (ref 11.5–15.5)
IMMATURE GRANULOCYTES #: 0.02 E9/L
IMMATURE GRANULOCYTES %: 0.3 % (ref 0–5)
LYMPHOCYTES ABSOLUTE: 2.32 E9/L (ref 1.5–4)
LYMPHOCYTES RELATIVE PERCENT: 34.8 % (ref 20–42)
MCH RBC QN AUTO: 35 PG (ref 26–35)
MCHC RBC AUTO-ENTMCNC: 33.4 % (ref 32–34.5)
MCV RBC AUTO: 104.7 FL (ref 80–99.9)
MONOCYTES ABSOLUTE: 0.49 E9/L (ref 0.1–0.95)
MONOCYTES RELATIVE PERCENT: 7.4 % (ref 2–12)
NEUTROPHILS ABSOLUTE: 3.74 E9/L (ref 1.8–7.3)
NEUTROPHILS RELATIVE PERCENT: 56.1 % (ref 43–80)
PDW BLD-RTO: 14.4 FL (ref 11.5–15)
PLATELET # BLD: 387 E9/L (ref 130–450)
PMV BLD AUTO: 10.3 FL (ref 7–12)
POTASSIUM SERPL-SCNC: 3.5 MMOL/L (ref 3.5–5)
RBC # BLD: 3.8 E12/L (ref 3.5–5.5)
SODIUM BLD-SCNC: 140 MMOL/L (ref 132–146)
TOTAL PROTEIN: 7.3 G/DL (ref 6.4–8.3)
WBC # BLD: 6.7 E9/L (ref 4.5–11.5)

## 2020-06-05 PROCEDURE — 36415 COLL VENOUS BLD VENIPUNCTURE: CPT

## 2020-06-05 PROCEDURE — 80053 COMPREHEN METABOLIC PANEL: CPT

## 2020-06-05 PROCEDURE — 85025 COMPLETE CBC W/AUTO DIFF WBC: CPT

## 2020-06-05 PROCEDURE — 82140 ASSAY OF AMMONIA: CPT

## 2020-06-12 ENCOUNTER — HOSPITAL ENCOUNTER (OUTPATIENT)
Age: 85
Discharge: HOME | End: 2020-06-14
Payer: MEDICARE

## 2020-06-12 PROCEDURE — U0003 INFECTIOUS AGENT DETECTION BY NUCLEIC ACID (DNA OR RNA); SEVERE ACUTE RESPIRATORY SYNDROME CORONAVIRUS 2 (SARS-COV-2) (CORONAVIRUS DISEASE [COVID-19]), AMPLIFIED PROBE TECHNIQUE, MAKING USE OF HIGH THROUGHPUT TECHNOLOGIES AS DESCRIBED BY CMS-2020-01-R: HCPCS

## 2020-06-16 ENCOUNTER — HOSPITAL ENCOUNTER (OUTPATIENT)
Age: 85
Discharge: HOME | End: 2020-06-18
Payer: MEDICARE

## 2020-06-16 LAB
ANION GAP SERPL CALCULATED.3IONS-SCNC: 12 MMOL/L (ref 7–16)
BUN BLDV-MCNC: 15 MG/DL (ref 8–23)
CALCIUM SERPL-MCNC: 9.2 MG/DL (ref 8.6–10.2)
CHLORIDE BLD-SCNC: 98 MMOL/L (ref 98–107)
CO2: 26 MMOL/L (ref 22–29)
CREAT SERPL-MCNC: 0.7 MG/DL (ref 0.5–1)
GFR AFRICAN AMERICAN: >60
GFR NON-AFRICAN AMERICAN: >60 ML/MIN/1.73
GLUCOSE BLD-MCNC: 102 MG/DL (ref 74–99)
POTASSIUM SERPL-SCNC: 4 MMOL/L (ref 3.5–5)
SARS-COV-2: NOT DETECTED
SODIUM BLD-SCNC: 136 MMOL/L (ref 132–146)
SOURCE: NORMAL

## 2020-06-16 PROCEDURE — 80048 BASIC METABOLIC PNL TOTAL CA: CPT

## 2020-06-16 PROCEDURE — U0003 INFECTIOUS AGENT DETECTION BY NUCLEIC ACID (DNA OR RNA); SEVERE ACUTE RESPIRATORY SYNDROME CORONAVIRUS 2 (SARS-COV-2) (CORONAVIRUS DISEASE [COVID-19]), AMPLIFIED PROBE TECHNIQUE, MAKING USE OF HIGH THROUGHPUT TECHNOLOGIES AS DESCRIBED BY CMS-2020-01-R: HCPCS

## 2020-06-16 PROCEDURE — 36415 COLL VENOUS BLD VENIPUNCTURE: CPT

## 2020-06-19 LAB
SARS-COV-2: NOT DETECTED
SOURCE: NORMAL

## 2020-08-18 PROBLEM — N17.9 AKI (ACUTE KIDNEY INJURY) (HCC): Status: ACTIVE | Noted: 2020-01-01

## 2020-08-18 NOTE — PROGRESS NOTES
Dr Shelby Doherty called the floor to check on Audrey Holloway. Labs reviewed, I updated him that currently Audrey Holloway is very confused and has pulled out her IV and removed her heart monitor repeatedly. Order obtained for bilat soft wrist restraints for patient safety, garcia catheter and IV fluid orders as well.

## 2020-08-18 NOTE — ED PROVIDER NOTES
Department of Emergency Medicine   ED  Provider Note  Admit Date/RoomTime: 8/18/2020  2:12 PM  ED Room: 01/01          History of Present Illness:  8/18/20, Time: 2:27 PM EDT  Chief Complaint   Patient presents with    Abnormal Lab     sent for abnormal creat and BUN, NH states nephrology has no appt                Jackie Bourne is a 80 y.o. female presenting to the ED for renal failure, beginning prior to arrival.  The complaint has been constant, severe in severity, and worsened by nothing. Sent for evaluation of elevated BUN and creatinine. Labs from 5 AM this morning show a BUN of 132 and creatinine of 7.1. She was sent here for further evaluation. She is confused but awake and alert. She denies complaints. She denies abdominal pain. Review of Systems:   Pertinent positives and negatives are stated within HPI, all other systems reviewed and are negative.        --------------------------------------------- PAST HISTORY ---------------------------------------------  Past Medical History:  has a past medical history of Bleeding ulcer, Blood circulation, collateral, GERD (gastroesophageal reflux disease), and Hypertension. Past Surgical History:  has a past surgical history that includes Hysterectomy (early 80's); Endoscopy, colon, diagnostic (colonoscopy); Tonsillectomy (as a child); Wrist fracture surgery (Right, as a child); and fracture surgery (Right, 12/7/2013). Social History:  reports that she has never smoked. She has never used smokeless tobacco. She reports that she does not drink alcohol or use drugs. Family History: family history is not on file. . Unless otherwise noted, family history is non contributory    The patients home medications have been reviewed. Allergies: Patient has no known allergies.     I have reviewed the past medical history, past surgical history, social history, and family history    ---------------------------------------------------PHYSICAL EXAM--------------------------------------    Constitutional/General: Alert and oriented x1  Head: Normocephalic and atraumatic  Eyes: PERRL, EOMI, sclera non icteric  Mouth: Oropharynx clear, handling secretions  Neck: Supple, full ROM, no stridor, no meningeal signs  Respiratory: Lungs clear to auscultation bilaterally, no wheezes, rales, or rhonchi. Not in respiratory distress  Cardiovascular:  Regular rate. Regular rhythm. No murmurs, no aortic murmurs, no gallops, or rubs. 2+ distal pulses. Equal extremity pulses. Chest: No chest wall tenderness  Gastrointestinal:  Abdomen Soft, Non tender, Non distended. No rebound, guarding, or rigidity. No pulsatile masses. Musculoskeletal: Moves all extremities x 4. Warm and well perfused, no clubbing, no cyanosis, no edema. Capillary refill <3 seconds  Skin: skin warm and dry. No rashes. Neurologic: GCS 14, no focal deficits, symmetric strength 5/5 in the upper and lower extremities bilaterally  Psychiatric: Normal Affect          -------------------------------------------------- RESULTS -------------------------------------------------  I have personally reviewed all laboratory and imaging results for this patient. Results are listed below. LABS: (Lab results interpreted by me)  No results found for this visit on 08/18/20.,       RADIOLOGY:  Interpreted by Radiologist unless otherwise specified  CT ABDOMEN PELVIS WO CONTRAST Additional Contrast? None    (Results Pending)               ------------------------- NURSING NOTES AND VITALS REVIEWED ---------------------------   The nursing notes within the ED encounter and vital signs as below have been reviewed by myself  BP (!) 134/99   Pulse 81   Temp 97.6 °F (36.4 °C)   Resp 18   Wt 180 lb (81.6 kg)   SpO2 95%   BMI 32.92 kg/m²     Oxygen Saturation Interpretation: Normal    The patients available past medical records and past encounters were reviewed.         ------------------------------ ED

## 2020-08-18 NOTE — ED NOTES
Bed: 01  Expected date:   Expected time:   Means of arrival:   Comments:     Johnny Shannon RN  08/18/20 5297

## 2020-08-18 NOTE — ED NOTES
Patient refused to give urine specimen at this time. Patient states I dont have to pee and I will not now.      Haily Durán RN  08/18/20 6248

## 2020-08-19 PROBLEM — E79.0 HYPERURICEMIA: Status: ACTIVE | Noted: 2020-01-01

## 2020-08-19 PROBLEM — E87.20 METABOLIC ACIDOSIS: Status: ACTIVE | Noted: 2020-01-01

## 2020-08-19 NOTE — CARE COORDINATION
SOCIAL WORK/DISCHARGE PLANNING;  Pt is a resident at [facility]. She is a long term resident. Per liaison, Hiwot Faith, pt is  private pay there. They will submit for insurance pre-cert but if denied, she can still return there at an ICF level. Pt's brother Pablo Downs is pt's POA and he also resides at [facility]. Per liaison, if something is needed, Pablo Downs can be called at [facility]([phone]). Plan is to return to [facility] at discharge. Envelope in chart.   Calos Mann [facility]  [phone]

## 2020-08-19 NOTE — DISCHARGE INSTR - COC
Continuity of Care Form    Patient Name: Gian Hickey   :  1932  MRN:  [id number]    Admit date:  2020  Discharge date: 2020    Code Status Order: Prior   Advance Directives:   [facility] Documentation     Date/Time Healthcare Directive Type of Healthcare Directive Copy in [address] Agent's Name Healthcare Agent's Phone Number    20  No, patient does not have an advance directive for healthcare treatment -- -- -- -- --          Admitting Physician:  Rakel Burciaga DO  PCP: Dimas Orellana DO    Discharging Nurse: [facility] Unit/Room#: 0022/8595-K  Discharging Unit Phone Number: [phone]    Emergency Contact:   Extended Emergency Contact Information  Primary Emergency Contact: [address] [facility] [address] Phone: [phone]  Relation: Other    Past Surgical History:  Past Surgical History:   Procedure Laterality Date    ENDOSCOPY, COLON, DIAGNOSTIC  colonoscopy    FRACTURE SURGERY Right 2013    ORIF RIGHT FEMUR    HYSTERECTOMY  early     TONSILLECTOMY  as a child    WRIST FRACTURE SURGERY Right as a child       Immunization History:   Immunization History   Administered Date(s) Administered    Influenza Virus Vaccine 2013       Active Problems:  Patient Active Problem List   Diagnosis Code    GI bleed K92.2    Duodenal ulcer K26.9    Hypertension I10    Osteoarthritis M19.90    Anemia due to blood loss     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 ([facility] [address] F02.80    Closed displaced supracondylar fracture with intracondylar extension of lower end of right femur with malunion S72.461P    Osteoarthritis of right knee M17.11    LETHA (acute kidney injury) ([facility] [address] N17.9    PVD (peripheral vascular disease) ([facility] I73.9       Isolation/Infection:   Isolation          Contact        Patient Infection Status     Infection Onset Added Last Indicated Last Indicated By Review Planned Expiration Resolved Resolved By ESBL (Extended Spectrum Beta Lactamase) 20 Culture, Urine        E Coli Urine 20, 20    Resolved    COVID-19 Rule Out 20 Covid-19 Ambulatory (Ordered)   20 Rule-Out Test Resulted    COVID-19 Rule Out 20 Covid-19 Ambulatory (Ordered)   20 Rule-Out Test Resulted    COVID-19 20 Covid-19 Ambulatory   20     COVID-19 Rule Out 20 Covid-19 Ambulatory (Ordered)   20 Rule-Out Test Resulted          Nurse Assessment:  Last Vital Signs: /60   Pulse 94   Temp 97.1 °F (36.2 °C) (Temporal)   Resp 16   Wt 174 lb (78.9 kg)   SpO2 96%   BMI 31.83 kg/m²     Last documented pain score (0-10 scale): Pain Level: 0  Last Weight:   Wt Readings from Last 1 Encounters:   20 174 lb (78.9 kg)     Mental Status:  alert    IV Access:  - None    Nursing Mobility/ADLs:  Walking   Dependent  Transfer  Dependent  Bathing  Dependent  Dressing  Dependent  Toileting  Dependent  Feeding  Dependent  Med Admin  Dependent  Med Delivery   crushed    Wound Care Documentation and Therapy:  Incision 13 Leg Upper; Lower;Right (Active)   Number of days: 2446        Elimination:  Continence:   · Bowel: No  · Bladder: No  Urinary Catheter: None   Colostomy/Ileostomy/Ileal Conduit: No       Date of Last BM: ***    Intake/Output Summary (Last 24 hours) at 2020 0935  Last data filed at 2020 0851  Gross per 24 hour   Intake 60 ml   Output 150 ml   Net -90 ml     I/O last 3 completed shifts: In: 0   Out: 150 [Urine:150]    Safety Concerns:      At Risk for Falls and Aspiration Risk    Impairments/Disabilities:      Vision and Hearing    Nutrition Therapy:  Current Nutrition Therapy:   - Tube Feedings:  Standard with fiber    Routes of Feeding: Gastrostomy Tube, pleasure trays    Liquids: No Restrictions  Daily Fluid Restriction: no  Last Modified Barium Swallow with Video (Video Swallowing Test): not done    Treatments at the Time of Hospital Discharge:   Respiratory Treatments:     Oxygen Therapy:  {Therapy; copd oxygen:47816}  Ventilator:    {MH CC Vent XRYP:859465796}    Rehab Therapies: {THERAPEUTIC INTERVENTION:3773053842}  Weight Bearing Status/Restrictions: [address] Weight Bearin}  Other Medical Equipment (for information only, NOT a DME order):  {EQUIPMENT:428548546}  Other Treatments: ***    Patient's personal belongings (please select all that are sent with patient):  {P DME Belongings:944432902}    RN SIGNATURE:  Electronically signed by En Hines RN on 20 at 11:55 AM EDT    CASE MANAGEMENT/SOCIAL WORK SECTION    Inpatient Status Date: ***    Readmission Risk Assessment Score:  Readmission Risk              Risk of Unplanned Readmission:        14           Discharging to Facility/ Agency   · Name: [facility]  · Address:  · Phone:[phone]  · Fax:    Dialysis Facility (if applicable)   · Name:  · Address:  · Dialysis Schedule:  · Phone:  · Fax:    / signature:Electronically signed by AZUL Marroquin on 2020 at 9:36 AM      PHYSICIAN SECTION    Prognosis: {Prognosis:1175842916}    Condition at Discharge: [address] Patient Condition:699168993}    Rehab Potential (if transferring to Rehab): {Prognosis:4656012379}    Recommended Labs or Other Treatments After Discharge: ***    Physician Certification: I certify the above information and transfer of Jackie Bourne  is necessary for the continuing treatment of the diagnosis listed and that she requires {Admit to Appropriate Level of Care:38236} for {GREATER/LESS:331334077} 30 days.      Update Admission H&P: {CHP DME Changes in [facility]:617706848}    PHYSICIAN SIGNATURE:  Electronically signed by Abel Mcginnis DO on 20 at 12:04 PM EDT

## 2020-08-19 NOTE — PLAN OF CARE
Problem: Falls - Risk of:  Goal: Will remain free from falls  Description: Will remain free from falls  8/19/2020 0134 by Zayra Sanchez RN  Outcome: Met This Shift  8/18/2020 2231 by Zayra Sanchez RN  Outcome: Met This Shift  Goal: Absence of physical injury  Description: Absence of physical injury  Outcome: Met This Shift

## 2020-08-19 NOTE — PROGRESS NOTES
Patient hallucinating that bugs are flying around. She remains confused and trying to pull out IV and garcia. Patient put in bilat soft wrist restraints for safety. Elodia's POA brother Obdulia Garcia notified of need for restraints.

## 2020-08-19 NOTE — PLAN OF CARE
Problem: Restraint Use - Nonviolent/Non-Self-Destructive Behavior:  Goal: Absence of restraint indications  Description: Absence of restraint indications  8/18/2020 2232 by Celia Newton RN  Outcome: Not Met This Shift  8/18/2020 2231 by Celia Newton, RN  Outcome: Met This Shift  Goal: Absence of restraint-related injury  Description: Absence of restraint-related injury  Outcome: Met This Shift

## 2020-08-19 NOTE — PROGRESS NOTES
Patient agitated and continues to pull lines and tubes. Bilateral SWR remain intact at this time. Will continue to monitor.

## 2020-08-19 NOTE — H&P
polyethylene glycol (GLYCOLAX) powder, Take 17 g by mouth daily as needed (constipation)     Allergies:    Patient has no known allergies. Social History:    reports that she has never smoked. She has never used smokeless tobacco. She reports that she does not drink alcohol or use drugs. Family History:   family history is not on file. REVIEW OF SYSTEMS:  As above in the HPI, otherwise negative    PHYSICAL EXAM:    VS: /82   Pulse 93   Temp 98 °F (36.7 °C) (Infrared)   Resp 17   Wt 174 lb (78.9 kg)   SpO2 95%   BMI 31.83 kg/m²     General appearance: Alert, Awake, conversant, pleasantly confused; aware of her name only. No distress  Skin: Warm and dry ; no rashes  Head: Normocephalic. No masses, lesions or tenderness noted  Eyes: Conjunctivae pink, sclera white. PERRL,EOM-I  Ears: External ears normal  Nose/Sinuses: Nares normal. Septum midline. Mucosa normal. No drainage  Oropharynx: Equivocally dry  Neck: Supple. No jugular venous distension, lymphadenopathy or thyromegaly Trachea midline  Lungs: Clear to auscultation bilaterally. No rhonchi, crackles or wheezes  Heart: S1 S2  Regular rate and rhythm. No rub, murmur or gallop  Abdomen: Soft, non-tender. BS normal. No masses, organomegaly; no rebound or guarding  Extremities: Trace edema, Peripheral pulses weak  Musculoskeletal: Muscular strength appears weak  Neuro:  No focal motor defects ; II-XII grossly intact .  LINTON equally  Breast: deferred  Rectal: deferred  Genitalia:  deferred    LABS:  CBC:   Lab Results   Component Value Date    WBC 7.9 08/19/2020    RBC 3.60 08/19/2020    HGB 12.4 08/19/2020    HCT 36.8 08/19/2020    .2 08/19/2020    MCH 34.4 08/19/2020    MCHC 33.7 08/19/2020    RDW 13.5 08/19/2020     08/19/2020    MPV 10.4 08/19/2020     CBC with Differential:    Lab Results   Component Value Date    WBC 7.9 08/19/2020    RBC 3.60 08/19/2020    HGB 12.4 08/19/2020    HCT 36.8 08/19/2020     08/19/2020 .2 08/19/2020    MCH 34.4 08/19/2020    MCHC 33.7 08/19/2020    RDW 13.5 08/19/2020    NRBC 0.9 01/08/2017    SEGSPCT 66 12/06/2013    LYMPHOPCT 33.3 08/19/2020    MONOPCT 5.3 08/19/2020    BASOPCT 0.5 08/19/2020    MONOSABS 0.42 08/19/2020    LYMPHSABS 2.64 08/19/2020    EOSABS 0.10 08/19/2020    BASOSABS 0.04 08/19/2020     Hemoglobin/Hematocrit:    Lab Results   Component Value Date    HGB 12.4 08/19/2020    HCT 36.8 08/19/2020     CMP:    Lab Results   Component Value Date     08/19/2020    K 3.8 08/19/2020     08/19/2020    CO2 14 08/19/2020     08/19/2020    CREATININE 7.0 08/19/2020    GFRAA 7 08/19/2020    LABGLOM 6 08/19/2020    GLUCOSE 109 08/19/2020    GLUCOSE 100 05/21/2012    PROT 7.2 08/19/2020    LABALBU 3.3 08/19/2020    LABALBU 4.2 05/21/2012    CALCIUM 9.9 08/19/2020    BILITOT 0.4 08/19/2020    ALKPHOS 101 08/19/2020    AST 15 08/19/2020    ALT 13 08/19/2020     BMP:    Lab Results   Component Value Date     08/19/2020    K 3.8 08/19/2020     08/19/2020    CO2 14 08/19/2020     08/19/2020    LABALBU 3.3 08/19/2020    LABALBU 4.2 05/21/2012    CREATININE 7.0 08/19/2020    CALCIUM 9.9 08/19/2020    GFRAA 7 08/19/2020    LABGLOM 6 08/19/2020    GLUCOSE 109 08/19/2020    GLUCOSE 100 05/21/2012     Hepatic Function Panel:    Lab Results   Component Value Date    ALKPHOS 101 08/19/2020    ALT 13 08/19/2020    AST 15 08/19/2020    PROT 7.2 08/19/2020    BILITOT 0.4 08/19/2020    BILIDIR <0.2 08/19/2020    IBILI see below 08/19/2020    LABALBU 3.3 08/19/2020    LABALBU 4.2 05/21/2012     Magnesium:    Lab Results   Component Value Date    MG 1.5 08/19/2020     Phosphorus:    Lab Results   Component Value Date    PHOS 4.3 08/19/2020     Uric Acid:    Lab Results   Component Value Date    LABURIC 13.2 08/19/2020     PT/INR:    Lab Results   Component Value Date    PROTIME 11.3 12/06/2013    INR 1.0 12/06/2013     Warfarin PT/INR:  No components found for: labs  Continue oral feedings, renal diet  Soft wrist restraints as needed          Please note that over 50 minutes was spent in evaluating the patient, review of records and results, discussion with staff/family, etc.    [address]  11:33 AM  8/19/2020    Voice recognition software use for dictation    Updated: Hospital Course:  Admitted and treated for LETHA, metabolic acidosis and dehydration. Oral intake remained inadequate and PEG tube placed prior to discharge. Tube feedings tolerated well and renal function approaching baseline. The patient is discharged in improved and stable condition with OP f/u as tolerated. Repeat BMP recommended in 5-7 days.

## 2020-08-19 NOTE — PROGRESS NOTES
Occupational Therapy  OCCUPATIONAL THERAPY INITIAL EVALUATION      Date:2020  Patient Name: Yumi Salas  MRN: [id number]  : 1932  Room: [address] [place] [address]    Evaluating OT: Catherine Berger OTR/L   Referring provider: Bren Preston DO     AM-PAC Daily Activity Raw Score: 10/24  Recommended Adaptive Equipment: continue to assess      Diagnosis: LETHA renal failure    Pertinent Medical History: PVD, LETHA, GERN  Additional information:     Precautions:  Falls, bed alarm, 2pt wrist restaints, confusion     Home Living:  Per chart pt is long term resident at [facility] [place] [place]    Prior Level of Function: unclear pt is poor historian     Pain Level: No c/o pain this session     Cognition: A&O: person only;  Follows simple familiar 1 step directions with cues    Memory: P   Sequencing: F-; through BADLS   Problem solving: P   Judgement/safety: P     Functional Assessment:   Initial Eval Status  Date:  Treatment Status  Date: Short Term Goals  Treatment frequency: 1-3x/week on PRN    Feeding Mod A after setup      Grooming/Hygiene Mod A overall  Min A to comb hair and brush teeth sitting in chair position in bed  Supervision   while seated     UB Dressing Mod A  Supervision after setup  Seated      LB Dressing DEP   Max A      Bathing Max A     Toileting DEP  Max A   Bed Mobility  Supine to sit: DEP  Sit to supine: DEP  DEP to reposition in bed  Max A to long sit in bed  Max A to assist with shea care and dressing   Functional Transfers Sit to stand: NT  Stand to sit: NT  Max A  From varying surfaces with G safety    Functional Mobility NT     Balance Sitting:      Static: NT    Dynamic:NT  *unable to achieve full sitting position before pt refusing-pt able to long sit in bed with Mod A to maintain  Standing: NT     Endurance/Activity Tolerance F-; pt emotionally labile limited by cognition    F+    during 15-20 min ADL task    Visual/  Perceptual Glasses: grossly WFL acuity         UE strengthening    See UE Assessment Below  G tolerance  For BUE AROM/AAROM in all planes to improve overall function for ADL tasks      UE Assessment  Hand dominance: R     Strength ROM  Comments   RUE  Proximal: 3-/5  Distal: 4-/5 Proximal:< 3/4 full range; PROM limited d/t pain and stiffness at shoulder joint   Distal: WFL F  and F FMC/dexterity noted during ADL tasks   LUE Proximal: 3-/5  Distal: 4-/5 Proximal: < 3/4 full range; PROM limited d/t pain and stiffness at shoulder joint   Distal: WFL F  and F FMC/dexterity noted during ADL tasks     Hearing: [facility]  Sensation: not reported   Tone: gen weakness; LE contractures  Edema: moderate in LEs                               Comments:   Upon arrival pt supine in bed, confused and wanting to \"go to sleep\" and to have therapist \"get her in bed\". Pt confused however cooperative with attempts to sit EOB, pt then returned to bed, and repositioned in chair position for grooming task. At end of session, pt left in bed reclined to sleep per pt request, pt left  with all devices within reach, and restraints re-applied, bed alarm on. Overall, patient demonstrates severe difficulties with completion of BADLs and IADLs. Factors contributing to these difficulties include cognition, decreased endurance, and generalized weakness/contractures. Based on patient's functional performance as stated above and level of assistance needed prior to admission, this therapist believes that the patient would benefit from Continued therapy for ADL retraining, strengthening, building endurance/activity tolerance and overall functioning in order to safely return home. Treatment:     ? Bed mobility: Facilitated bed mobility with cues for proper body mechanics and sequencing to prepare for ADL completion at edge of bed  ?  ADL completion: Self-care retraining for the above-mentioned ADLs; training on proper technique, sequencing, and energy conservation techniques during ADLs and while completing bed mobility. ? Cognitive/Perceptual training: retraining exercises to improve attention, mentation, and spatial awareness for ADLs & transfers. ? Skilled positioning: Pt properly positioned using pillows and bed mechanics to improve interaction with environment, overall functioning and decrease/prevent edema and contractures. Eval Complexity:   mod Complexity  · History: Expanded review of medical records and additional review of physical, cognitive, or psychosocial history related to current functional performance  · Exam: 3+ performance deficits  · Assistance/Modification: Min/mod assistance or modifications required to perform tasks. May have comorbidities that affect occupational performance. Assessment of current deficits:   Functional mobility [x]  ADLs [x] Strength [x]  Cognition [x]  Functional transfers  [x] IADLs [x] Safety Awareness [x]  Endurance [x]  Fine Motor Coordination [x] Balance [x] Vision/perception [] Sensation []   Gross Motor Coordination [] ROM [x] Delirium [x]                  Motor Control []    Plan of Care:  ADL retraining [x]   Equipment needs [x]   Neuromuscular re-education [] Energy Conservation Techniques [x]  Functional Transfer training [x] Patient and/or Family Education [x]  Functional Mobility training [x]  Environmental Modifications [x]  Cognitive re-training [x]   Compensatory techniques for ADLs [x]  Splinting Needs []   Positioning to improve overall function [x]   Therapeutic Activity [x]                       Therapeutic Exercise  [x]  Visual/Perceptual: []    Delirium prevention/treatment  [x]   Other:  []    Rehab Potential: Good for established goals    Patient / Family Goal: No goals specifically stated at this time; no family present. Patient and/or family were instructed/educated on diagnosis, prognosis/goals and plan of care. Demonstrated P understanding, further information  needed.     [] Malnutrition indicators have been identified and nursing has been notified to ensure a dietitian consult is ordered. Moderate Evaluation + 25m tx  Time In:1320           Time Out: 1350                Treatment Charges: Mins Units   Ther Ex  20340       Manual Therapy 87439       Thera Activities 94379 10    ADL/Home Mgt 95881  15     Neuro Re-ed 33630       Orthotic manage/training  79303       Non-Billable Time       Total Timed Treatment 25 2      Evaluation time includes thorough review of current medical information, gathering information on past medical history/social history and prior level of function, completion of standardized testing/informal observation of tasks, assessment of data and development of POC/Goals.      Fly Staton, OTR/L   4237

## 2020-08-19 NOTE — PROGRESS NOTES
Physical Therapy  Physical Therapy Initial Assessment     Name: Ta Dodson  : 1932  MRN: [id number]    Referring Provider:  Norman Mosher DO     Date of Service: 2020    Evaluating PT:  Diane Villafana PT, DPT. [id number]    Room #:  1612/4456-A  Diagnosis:  LETHA   Reason for admission:  Abnormal labs   Precautions:  Falls, confusion, 2 pt restraints  Pertinent PMHx: PVD, LETHA   Procedures: none  Equipment Recommendations:  TBD    SUBJECTIVE:  Pt is a poor historian, unable to provide any info. Admitted from NH. Unknown PLOF    OBJECTIVE:   Initial Evaluation  Date:  Treatment   Short Term/ Long Term   Goals   AM-PAC 6 Clicks      Was pt agreeable to Eval/treatment? Yes      Does pt have pain? No report     Bed Mobility  Rolling: Dependent  Supine to sit: NT  Sit to supine: NT  Scooting: Dependent  -pt refused to attempt to sit EOB  ModA   Transfers Sit to stand: NT  Stand to sit: NT  Stand pivot: NT  MaxA   Ambulation    NT    TBD   Stair negotiation: ascended and descended  NT  TBD   ROM BUE:  See OT eval   BLE:  Ankle PF contractures     Strength BUE:  See OT eval   BLE:  knee ext 2/5  Ankle DF 1/5  Increase by 1/3 MMT grade    Balance Sitting EOB:  NT  Dynamic Standing:  NT  Sitting EOB:  Teresa  Dynamic Standing:  MaxA     -Pt is A & O x 1 to self. -Sensation:  unremarkable   -Edema:  unremarkable     Therapeutic Exercises:  functional activity     Patient education  Pt educated on safety, sequencing of transfers, and role of PT    Patient response to education:   Pt verbalized understanding Pt demonstrated skill Pt requires further education in this area   Yes  No  Yes      ASSESSMENT:    Comments:  Pt received supine in bed and agreeable to PT session   Pt initially agreeable to evaluation but when asked to sit up to EOB pt began refusing. unable to encourage or reason with the pt d/t her cognition.  Pt only oriented to self and not understanding logic behind sitting up -- states \"Not tonight, im just not in the mood but I could walk if I wanted to\". Pt with B ankle PF contractures. Rolling and scooting completed in bed for positioning -- pt not assisting and only perseverating on wanting water. Placed in semi upright position to end session. Pt with all needs met and call light in reach. Pt would benefit from continued PT POC to address functional deficits described above. Treatment:  Patient practiced and was instructed in the following treatment:     Patient education provided continuously throughout session for sequencing, safety maintenance, and improving any deficits found during the evaluation.  Bed mobility rolling and scooting with assist as needed and verbal cues to sequence  · Skilled positioning - Pt placed in semi upright position with pillows utilized to facilitate upright posture, joint and skin integrity, and interaction with environment. Pt's/ family goals   1. None stated     Patient and or family understand(s) diagnosis, prognosis, and plan of care. no    PLAN:    PT care will be provided in accordance with the objectives noted above. Exercises and functional mobility practice will be used as well as appropriate assistive devices or modalities to obtain goals. Patient and family education will also be administered as needed. Frequency of treatments: 2-5x/week x 1-2 weeks. Time in  0745  Time out  0800    Total Treatment Time 8 minutes     Evaluation Time includes thorough review of current medical information, gathering information on past medical history/social history and prior level of function, completion of standardized testing/informal observation of tasks, assessment of data and education on plan of care and goals.     CPT codes:  [x] Low Complexity PT evaluation 01743  [] Moderate Complexity PT evaluation 01620  [] High Complexity PT evaluation 73414  [] PT Re-evaluation 32998  [] Gait training 23708 -- minutes  [] Manual therapy 05559 --

## 2020-08-20 NOTE — CARE COORDINATION
SOCIAL WORK/DISCHARGE PLANNING;  Spoke with Meghan argueta. Pt will require a covid test prior to returning to [facility]. Provided unit with wet test. Pt is currently in 2pt restraints and will need to be restraint free prior to return. Will continue to follow.   Lottie Estes [facility]  [phone]

## 2020-08-20 NOTE — PROGRESS NOTES
[facility]  Nephrology Progress Note          SUBJECTIVE:   8/20: Patient confused, in restraints. Palliative care has been consulted.      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 [facility] [address]    Closed displaced supracondylar fracture with intracondylar extension of lower end of right femur with malunion    Osteoarthritis of right knee    LETHA (acute kidney injury) ([facility] [address]    PVD (peripheral vascular disease) ([facility] [address]    GERD (gastroesophageal reflux disease)    History of bleeding ulcers    Metabolic acidosis    Hyperuricemia        PAST MEDICAL HISTORY:    Past Medical History:   Diagnosis Date    LETHA (acute kidney injury) ([facility] [address] 8/18/2020    Bleeding ulcer 2008    Blood circulation, collateral pain to legs    GERD (gastroesophageal reflux disease)     History of bleeding ulcers     Hypertension     Hyperuricemia 6/47/9340    Metabolic acidosis 9/75/7152    PVD (peripheral vascular disease) ([facility] [address]        DIET:    DIET RENAL;     PHYSICAL EXAM:     Patient Vitals for the past 24 hrs:   BP Temp Temp src Pulse Resp SpO2   08/20/20 1200 (!) 118/55 98.2 °F (36.8 °C) -- 83 16 --   08/20/20 0830 (!) 102/51 -- -- 83 16 --   08/20/20 0600 (!) 107/53 97.5 °F (36.4 °C) Infrared 78 16 --   08/20/20 0400 (!) 104/42 97.5 °F (36.4 °C) Infrared 81 16 96 %   08/20/20 0200 112/60 97.2 °F (36.2 °C) Infrared 81 17 95 %   08/20/20 0000 (!) 110/59 97.6 °F (36.4 °C) Infrared 85 18 94 %   08/19/20 2200 115/61 97.8 °F (36.6 °C) Infrared 81 17 96 %   08/19/20 2038 (!) 112/56 98.1 °F (36.7 °C) Infrared 86 16 95 %   08/19/20 1900 121/61 97.8 °F (36.6 °C) Infrared 85 17 --   08/19/20 1800 115/82 98 °F (36.7 °C) Infrared 80 16 96 %   08/19/20 1600 (!) 110/56 97.6 °F (36.4 °C) Infrared 86 16 --   08/19/20 1400 132/76 98.1 °F (36.7 °C) Infrared 90 16 96 %   @      Intake/Output Summary (Last 24 hours) at 8/20/2020 1251  Last data filed at 8/20/2020 0830  Gross per 24 hour   Intake 300 ml   Output 875 ml   Net -575 ml         Wt Readings from Last 3 Encounters:   08/19/20 174 lb (78.9 kg)   01/08/17 180 lb (81.6 kg)   10/14/14 175 lb (79.4 kg)       Constitutional:  Pt is in no acute distress  Head: normocephalic, atraumatic  Neck: no JVD  Cardiovascular: regular rate and rhythm, no murmurs, gallops, or rubs  Respiratory:  No rales, rhochi, or wheezes  Gastrointestinal:  Soft, nontender, nondistended, bowel sounds x 4  Ext: No edema  Skin: dry, no rash  Neuro: confused    MEDS (scheduled):    metoprolol succinate  50 mg Oral Daily    allopurinol  100 mg Oral Daily    ferrous sulfate  325 mg Oral Daily with breakfast    heparin (porcine)  5,000 Units Subcutaneous Q8H    pantoprazole  40 mg Oral QAM AC       MEDS (infusions):   sodium bicarbonate infusion 100 mL/hr at 08/20/20 0400       MEDS (prn):  diphenhydrAMINE, HYDROcodone-acetaminophen, ondansetron, acetaminophen, mineral oil-hydrophilic petrolatum    DATA:    Recent Labs     08/18/20  1441 08/19/20  0555 08/20/20  0433   WBC 8.2 7.9 6.4   HGB 12.7 12.4 11.5   HCT 38.8 36.8 34.7   .3* 102.2* 103.0*    398 337     Recent Labs     08/18/20  1441 08/19/20  0555 08/19/20  2102 08/20/20  0433    143 138 144   K 4.6 3.8 3.3* 3.1*   CL 98 105 101 104   CO2 18* 14* 17* 20*   * 130* 125* 118*   CREATININE 7.1* 7.0* 6.5* 6.5*   LABGLOM 5 6 6 6   GLUCOSE 228* 109* 131* 94   CALCIUM 10.5* 9.9 9.5 9.3   ALT 14 13  --  12   AST 29 15  --  19   BILIDIR  --  <0.2  --  <0.2   BILITOT 0.3 0.4  --  0.3   ALKPHOS 106* 101  --  92   MG  --  1.5*  --  2.0   PHOS  --  4.3  --  3.8       Lab Results   Component Value Date    LABALBU 3.1 (L) 08/20/2020    LABALBU 3.3 (L) 08/19/2020    LABALBU 3.5 08/18/2020     Lab Results   Component Value Date    TSH 2.240 08/19/2020       Iron Studies  No results found for: IRON, TIBC, FERRITIN  Vitamin B-12   Date Value Ref Range Status   08/19/2020 >2000 (H) 211 - 946 pg/mL Final     Folate   Date Value Ref Range Status   08/19/2020 >20.0 4.8 - 24.2 ng/mL Final       Vit D, 25-Hydroxy   Date Value Ref Range Status   07/14/2020 30 30 - 100 ng/mL Final     Comment:     <20 ng/mL. ........... Ramses Michael Deficient  20-30 ng/mL. ......... Philbert Michael Insufficient   ng/mL. ........ Philbert Michael Sufficient  >100 ng/mL. .......... Philbert Michael Toxic       No results found for: PTH    No components found for: URIC    Lab Results   Component Value Date    COLORU Yellow 07/16/2020    NITRU POSITIVE 07/16/2020    GLUCOSEU Negative 07/16/2020    KETUA Negative 07/16/2020    UROBILINOGEN 0.2 07/16/2020    BILIRUBINUR Negative 07/16/2020       No results found for: Lissy Christian      IMPRESSION/RECOMMENDATIONS:      1. Stage III Acute kidney injury-  Likely pre-renal in the setting of poor po intake/ dehydration   Baseline Cr 0.7 in July 2020  Abdominal CT no hydro, atrophic kidneys  FeNa >1%  Continue IVF  Follow labs daily  UO 875mL in last 24 hours  Cr 7.1->7.0->6.5     2. Metabolic acidosis-  In the setting of LETHA  Improving on bicarb drip  Goal CO2 >22     3. Hyperuricemia-  Re-evaluate after IV hydration     4.  Hypomagnesemia with hypokalemia-   Mag 2.0 s/p supplementation  K 3.1- will replace  Follow    TIKA Torres - NP     Patient examined , alert responsive   Po intake seems to be improving     garcia draining clear urine   Patient now making urine   Responding to hydration /acidosis better on bicarb drip   Hypokalemia - will replace K   Dr Vargas Jones

## 2020-08-20 NOTE — PROGRESS NOTES
Physician Progress Note      PATIENTRicki Bruce  CSN #:                  [id number]  :                       1932  ADMIT DATE:       2020 2:12 PM  DISCH DATE:  RESPONDING  PROVIDER #:        Lakesha Sears MD          QUERY TEXT:    Dear Dr Omar Bustillo and associates,    Pt admitted with LETHA. Pt noted to have poor oral intake. If possible, please   document in progress notes and discharge summary the relationship, if any,   between LETHA and dehydration. The medical record reflects the following:  Risk Factors: age poor oral intake dementia  Clinical Indicators: poor skin turgor  hypomagnesemia acidosis hypokalemia  Treatment: monitor I&O's fluid bolus and infusion    Fela Bruce RN BSN [facility]  [phone]  Options provided:  -- LETHA due to dehydration  -- LETHA unrelated to dehydration  -- Other - I will add my own diagnosis  -- Disagree - Not applicable / Not valid  -- Disagree - Clinically unable to determine / Unknown  -- Refer to Clinical Documentation Reviewer    PROVIDER RESPONSE TEXT:    This patient has LETHA due to dehydration.     Query created by: Cuba Kurtz on 2020 2:20 PM      Electronically signed by:  Lakesha Sears MD 2020 2:40 PM

## 2020-08-20 NOTE — PLAN OF CARE
Problem: Restraint Use - Nonviolent/Non-Self-Destructive Behavior:  Goal: Absence of restraint-related injury  Description: Absence of restraint-related injury  8/20/2020 0000 by Marylu Ruiz RN  Outcome: Met This Shift     Problem: Skin Integrity:  Goal: Will show no infection signs and symptoms  Description: Will show no infection signs and symptoms  Outcome: Met This Shift     Problem: Skin Integrity:  Goal: Absence of new skin breakdown  Description: Absence of new skin breakdown  Outcome: Met This Shift     Problem: Restraint Use - Nonviolent/Non-Self-Destructive Behavior:  Goal: Absence of restraint indications  Description: Absence of restraint indications  8/20/2020 0000 by Marylu Ruiz RN  Outcome: Not Met This Shift

## 2020-08-20 NOTE — PROGRESS NOTES
iterative reconstruction. INDICATION:  renal failure, r/o obstruction, r/o hydro renal failure, r/o obstruction, r/o hydro COMPARISON: 2013 FINDINGS: The lung bases demonstrate minimal atelectasis/groundglass opacities. There is coronary artery calcification. Liver is normal. Gallbladder is distended. Spleen, pancreas, and adrenals are normal. The kidneys are somewhat atrophic with the large cystic lesions bilaterally, the largest one in the right kidney measuring 3.5 cm. Degenerative changes are identified in the lumbar spine with a nearly 40% compression deformity of the inferior endplate of I83 without significant change. Pelvis. Bladder is partially distended. There is constipation with a distended thickened rectum. The appendix is not identified. Constipation with thickened distended rectum concerning for proctitis. There is no hydronephrosis or obstructing uropathy. Kidneys are somewhat atrophic. Atelectasis/infiltrates and groundglass densities in the lung bases which may be due to mild CHF and or pneumonia. Ct Head Wo Contrast    Result Date: 2020  Patient MRN:  [id number] : 1932 Age: 80 years Gender: Female Order Date:  2020 4:25 PM EXAM: CT HEAD WO CONTRAST INDICATION:  AMS AMS  COMPARISON: CT head without contrast, 2017 FINDINGS: PARENCHYMA:No mass effect, edema or hemorrhage. Moderate volume loss is seen in the cerebrum with mild to moderate chronic microvascular ischemic changes. VENTRICLES:No hydrocephalus. CALVARIUM:The calvarium is intact without fracture or focal lesion. SINUSES:The visualized paranasal sinuses and mastoids are clear. No acute intracranial abnormality.       Scheduled Meds:   metoprolol succinate  50 mg Oral Daily    allopurinol  100 mg Oral Daily    ferrous sulfate  325 mg Oral Daily with breakfast    heparin (porcine)  5,000 Units Subcutaneous Q8H    pantoprazole  40 mg Oral QAM AC     Continuous Infusions:   sodium bicarbonate infusion 100 mL/hr at 08/20/20 0400     PRN Meds:.diphenhydrAMINE, HYDROcodone-acetaminophen, ondansetron, acetaminophen, mineral oil-hydrophilic petrolatum    I/O last 3 completed shifts: In: 120 [P.O.:120]  Out: 875 [Urine:875]  No intake/output data recorded.     Intake/Output Summary (Last 24 hours) at 8/20/2020 0902  Last data filed at 8/20/2020 0405  Gross per 24 hour   Intake 60 ml   Output 875 ml   Net -815 ml       Assessment:    Active Hospital Problems    Diagnosis    Hypertension [I10]     Priority: Medium    Osteoarthritis [M19.90]     Priority: Medium    Metabolic acidosis [K83.3]    Hyperuricemia [E79.0]    GERD (gastroesophageal reflux disease) [K21.9]    History of bleeding ulcers [Z87.11]    LETHA (acute kidney injury) ([facility] [address] [N17.9]    PVD (peripheral vascular disease) ([facility] [address] [I73.9]    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

## 2020-08-20 NOTE — CONSULTS
brother  Contacts:  Denice Cabezas brother   Debbi Sears [phone], friend    Spiritual assessment: No spiritual distress identified   Bereavement and grief: to be determined    History obtain from EMR and staff    Past Medical History:   Diagnosis Date    LETHA (acute kidney injury) ([facility] 8/18/2020    Bleeding ulcer 2008    Blood circulation, collateral pain to legs    GERD (gastroesophageal reflux disease)     History of bleeding ulcers     Hypertension     Hyperuricemia 9/42/3533    Metabolic acidosis 4/11/8995    PVD (peripheral vascular disease) ([facility]        Past Surgical History:   Procedure Laterality Date    ENDOSCOPY, COLON, DIAGNOSTIC  colonoscopy    FRACTURE SURGERY Right 12/7/2013    ORIF RIGHT FEMUR    HYSTERECTOMY  early 80's    TONSILLECTOMY  as a child    WRIST FRACTURE SURGERY Right as a child       Current Medications:     potassium chloride  40 mEq Oral BID WC    metoprolol succinate  50 mg Oral Daily    allopurinol  100 mg Oral Daily    ferrous sulfate  325 mg Oral Daily with breakfast    heparin (porcine)  5,000 Units Subcutaneous Q8H    pantoprazole  40 mg Oral QAM AC       PRN Medications:  diphenhydrAMINE, HYDROcodone-acetaminophen, ondansetron, acetaminophen, mineral oil-hydrophilic petrolatum    IV Medications:   sodium bicarbonate infusion 100 mL/hr at 08/20/20 0400       Inpatient medications reviewed: yes  Home Medications reviewed: yes    No Known Allergies    History reviewed. No pertinent family history.     Social history:  [place] status: no  Marital status: Single  Living status: nursing home   Work history: retired  Tobacco use: unknown   History of drug use: unknown  History of alcohol use: unknown    Review of Systems:  unable to obtained due to current condition of patient    Objective:   PHYSICAL EXAM:     Vitals:    BP (!) 118/55   Pulse 83   Temp 98.2 °F (36.8 °C)   Resp 16   Wt 174 lb (78.9 kg)   SpO2 96%   BMI 31.83 kg/m²   Gen: elderly, obese, NAD, awake, alert   HEENT: normocephalic, atraumatic, PERRL, EOMI, sclera anicteric, conjunctiva pink and moist  Neck: trachea midline  Lungs: respirations easy and not labored  Heart: regular rate and rhythm  Abdomen: soft, non-tender, non-distended  Musculoskeletal: no gross deformity, moving all extremities, muscle tone good  Extremities: no clubbing, cyanosis or edema, bilateral wrist restraints   Skin: Without rashes, lesions, bruising  Neuro: awake, alert, oriented to self, can follow simple commands    I&O  I/O this shift:  In: 300 [P.O.:300]  Out: -     Intake/Output Summary (Last 24 hours) at 8/20/2020 1318  Last data filed at 8/20/2020 0830  Gross per 24 hour   Intake 300 ml   Output 875 ml   Net -575 ml       Results/Verification of Data Review  Objective data reviewed labs, radiology. Recent Labs     08/18/20  1441 08/19/20  0555 08/20/20  0433   WBC 8.2 7.9 6.4   RBC 3.72 3.60 3.37*   HGB 12.7 12.4 11.5   HCT 38.8 36.8 34.7   .3* 102.2* 103.0*   MCH 34.1 34.4 34.1   MCHC 32.7 33.7 33.1   RDW 13.7 13.5 13.7    398 337   MPV 10.3 10.4 10.3       Recent Labs     08/19/20  0555 08/19/20  2102 08/20/20  0433    138 144   K 3.8 3.3* 3.1*    101 104   CO2 14* 17* 20*   * 125* 118*   CREATININE 7.0* 6.5* 6.5*   GLUCOSE 109* 131* 94   CALCIUM 9.9 9.5 9.3       No results for input(s): POCGLU in the last 72 hours.     CBC with Differential:    Lab Results   Component Value Date    WBC 6.4 08/20/2020    RBC 3.37 08/20/2020    HGB 11.5 08/20/2020    HCT 34.7 08/20/2020     08/20/2020    .0 08/20/2020    MCH 34.1 08/20/2020    MCHC 33.1 08/20/2020    RDW 13.7 08/20/2020    NRBC 0.9 01/08/2017    SEGSPCT 66 12/06/2013    LYMPHOPCT 37.6 08/20/2020    MONOPCT 6.7 08/20/2020    BASOPCT 0.8 08/20/2020    MONOSABS 0.43 08/20/2020    LYMPHSABS 2.41 08/20/2020    EOSABS 0.09 08/20/2020    BASOSABS 0.05 08/20/2020     Hepatic Function Panel:    Lab Results   Component Value Date ALKPHOS 92 2020    ALT 12 2020    AST 19 2020    PROT 6.7 2020    BILITOT 0.3 2020    BILIDIR <0.2 2020    IBILI see below 2020    LABALBU 3.1 2020    LABALBU 4.2 2012       Urine:  UA:  Lab Results   Component Value Date    COLORU Yellow 2020    PHUR 6.0 2020    WBCUA >20 2020    RBCUA NONE 2020    RBCUA 0-1 2013    BACTERIA FEW 2020    CLARITYU Clear 2020    SPECGRAV 1.015 2020    LEUKOCYTESUR MODERATE 2020    UROBILINOGEN 0.2 2020    BILIRUBINUR Negative 2020    BLOODU Negative 2020    GLUCOSEU Negative 2020       Cultures:  Recent Labs     20  1839   BC 24 Hours no growth     Recent Labs     20  1833   BLOODCULT2 This organism was isolated in one set. Susceptibility testing is not routinely done as this  organism frequently represents skin contamination. Additional testing can be ordered by calling the  Microbiology Department. Recent Labs     20  1833   ORG Staphylococcus coagulase-negative*     No results for input(s): [address] in the last 72 hours. No results for input(s): LABURIN in the last 72 hours. No results for input(s): CULTRESP in the last 72 hours. No results for input(s): CXCATHTIP in the last 72 hours. Invalid input(s): STREPPNEUMAGU  No results for input(s): INFLUENA in the last 72 hours. No results for input(s): INFLUENB in the last 72 hours. No results for input(s): FLUBQC in the last 72 hours. No results for input(s): WNDABS in the last 72 hours. Imaging:  Ct Abdomen Pelvis Wo Contrast Additional Contrast? None    Result Date: 2020  Patient MRN:  [id number] : 1932 Age: 80 years Gender: Female Order Date:  2020 4:25 PM EXAM: CT ABDOMEN PELVIS WO CONTRAST number of images 379. Technique: Low-dose CT  acquisition technique included one of following options; 1 . Automated exposure control, 2.  Adjustment of MA and or KV according to patient's size or 3. Use of iterative reconstruction. INDICATION:  renal failure, r/o obstruction, r/o hydro renal failure, r/o obstruction, r/o hydro COMPARISON: 2013 FINDINGS: The lung bases demonstrate minimal atelectasis/groundglass opacities. There is coronary artery calcification. Liver is normal. Gallbladder is distended. Spleen, pancreas, and adrenals are normal. The kidneys are somewhat atrophic with the large cystic lesions bilaterally, the largest one in the right kidney measuring 3.5 cm. Degenerative changes are identified in the lumbar spine with a nearly 40% compression deformity of the inferior endplate of Y70 without significant change. Pelvis. Bladder is partially distended. There is constipation with a distended thickened rectum. The appendix is not identified. Constipation with thickened distended rectum concerning for proctitis. There is no hydronephrosis or obstructing uropathy. Kidneys are somewhat atrophic. Atelectasis/infiltrates and groundglass densities in the lung bases which may be due to mild CHF and or pneumonia. Ct Head Wo Contrast    Result Date: 2020  Patient MRN:  [id number] : 1932 Age: 80 years Gender: Female Order Date:  2020 4:25 PM EXAM: CT HEAD WO CONTRAST INDICATION:  AMS AMS  COMPARISON: CT head without contrast, 2017 FINDINGS: PARENCHYMA:No mass effect, edema or hemorrhage. Moderate volume loss is seen in the cerebrum with mild to moderate chronic microvascular ischemic changes. VENTRICLES:No hydrocephalus. CALVARIUM:The calvarium is intact without fracture or focal lesion. SINUSES:The visualized paranasal sinuses and mastoids are clear. No acute intracranial abnormality.       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 Urinary retention secondary to constipation    Constipation:  -Mineral oil enema followed by suppository  -Patient needs more aggressive bowel regimen likely on discharge    Acute kidney injury:  -Nephrology following    Palliative Care Encounter/Recommendations:  No family at bedside. Reportedly patient's brother is in a dementia unit as well and per facility documentation he is the power of . Patient has a friend Jasmin Contreras diagnosed as a contact as well as a  who is identified as a financial contact. Facility contacted to request a copy of advance directives to determine if there is an alternate identified. Palliative medicine will continue to follow and determine who the patient's decision-maker is currently. Family Meeting:  Participants:No family meeting held today awaiting return call from family  Family meeting was held to discuss:n/a     Controlled Substances Monitoring:      Discharge planning:   discharge to skilled facility    Discussed patient and the plan of care with the Palliative medicine IDT members. Referrals to: none today    Time/Communication  Greater than 51% of time spent, total 30 minutes in counseling and coordination of care at the bedside regarding/over telephone symptom management and see above. Arelis Bar PAMINDI  Palliative Medicine    Thank you for allowing Palliative Medicine to participate in the care of Lynette Parks.     Note: This report was completed using computerize voiced recognition software. Every effort has been made to ensure accuracy; however, inadvertent computerized transcription errors may be present.

## 2020-08-21 NOTE — PLAN OF CARE
Problem: Restraint Use - Nonviolent/Non-Self-Destructive Behavior:  Goal: Absence of restraint indications  Description: Absence of restraint indications  8/21/2020 1816 by Nely Anguiano RN  Outcome: Not Met This Shift  8/21/2020 1816 by Nely Anguiano RN  Outcome: Not Met This Shift  8/21/2020 1645 by Dulce Maria More RN  Outcome: Not Met This Shift  8/21/2020 0601 by Alfa Hassan RN  Outcome: Not Met This Shift

## 2020-08-21 NOTE — PROGRESS NOTES
Palliative Care Department  Progress Note  Provider: Arelis Lainez [facility] days prior to Consult: Hospital Day: 4    Referring Provider:  Francine Davis, DO      Reason for Consult:  [x]? Code status Discussion  [x]? Assist with goals of care  [x]? Psychosocial support  []?   Symptom Management     Chief Complaint: Abnormal creatinine and BUN    Subjective:   Chart reviewed  +BM s/p mineral oil enema/suppository  RN last evening note reviewed, facility reported unable to send Power of  forms  Discussed with social work/case management  See palliative encounter below    Past Medical History:   Diagnosis Date    LETHA [facility]acute kidney injury) ([facility] [address] 8/18/2020    Bleeding ulcer 2008    Blood circulation, collateral pain to legs    GERD (gastroesophageal reflux disease)     History of bleeding ulcers     Hypertension     Hyperuricemia 0/43/0519    Metabolic acidosis 0/83/8941    [facility]        Past Surgical History:   Procedure Laterality Date    ENDOSCOPY, COLON, DIAGNOSTIC  colonoscopy    FRACTURE SURGERY Right 12/7/2013    ORIF RIGHT FEMUR    HYSTERECTOMY  early 80's    TONSILLECTOMY  as a child    WRIST FRACTURE SURGERY Right as a child       Current Medications:     potassium chloride  10 mEq Intravenous Q1H    metoprolol succinate  50 mg Oral Daily    allopurinol  100 mg Oral Daily    ferrous sulfate  325 mg Oral Daily with breakfast    heparin (porcine)  5,000 Units Subcutaneous Q8H    pantoprazole  40 mg Oral QAM AC       PRN Medications:  diphenhydrAMINE, HYDROcodone-acetaminophen, ondansetron, acetaminophen, mineral oil-hydrophilic petrolatum    IV Medications:   0.9% NaCl with KCl 20 mEq 100 mL/hr at 08/21/20 1653     Inpatient medications reviewed: yes    No Known Allergies    Social history:  [place] status: no  Marital status: Single, never , no children  Living status: nursing home   Work history: retired  Tobacco use: unknown History of drug use: unknown  History of alcohol use: unknown      Objective:   Physical Exam    BP (!) 125/52   Pulse 92   Temp 98.2 °F (36.8 °C) (Axillary)   Resp 16   Wt 174 lb (78.9 kg)   SpO2 94%   BMI 31.83 kg/m²     Physical Exam  Constitutional:       General: She is not in acute distress. HENT:      Head: Normocephalic and atraumatic. Cardiovascular:      Rate and Rhythm: Normal rate and regular rhythm. Pulmonary:      Effort: No respiratory distress. Abdominal:      Comments:  8/20          Results/Verification of Data Review  Objective data reviewed: labs, images, records, medication use, vitals and chart    LABS:  Recent Labs     08/19/20  2102 08/20/20  0433 08/21/20  0657    144 143   K 3.3* 3.1* 2.6*    104 97*   CO2 17* 20* 30*   * 118* 102*   CREATININE 6.5* 6.5* 5.2*   GLUCOSE 131* 94 121*   CALCIUM 9.5 9.3 8.6       Recent Labs     08/19/20  0555 08/20/20  0433 08/21/20  0657   WBC 7.9 6.4 6.4   RBC 3.60 3.37* 3.24*   HGB 12.4 11.5 11.2*   HCT 36.8 34.7 32.5*   .2* 103.0* 100.3*   MCH 34.4 34.1 34.6   MCHC 33.7 33.1 34.5   RDW 13.5 13.7 13.3    337 301   MPV 10.4 10.3 10.1       No results for input(s): POCGLU in the last 72 hours.     CBC with Differential:    Lab Results   Component Value Date    WBC 6.4 08/21/2020    RBC 3.24 08/21/2020    HGB 11.2 08/21/2020    HCT 32.5 08/21/2020     08/21/2020    .3 08/21/2020    MCH 34.6 08/21/2020    MCHC 34.5 08/21/2020    RDW 13.3 08/21/2020    NRBC 0.9 01/08/2017    SEGSPCT 66 12/06/2013    LYMPHOPCT 37.5 08/21/2020    MONOPCT 6.6 08/21/2020    BASOPCT 0.5 08/21/2020    MONOSABS 0.42 08/21/2020    LYMPHSABS 2.39 08/21/2020    EOSABS 0.09 08/21/2020    BASOSABS 0.03 08/21/2020     Hepatic Function Panel:    Lab Results   Component Value Date    ALKPHOS 87 08/21/2020    ALT 11 08/21/2020    AST 19 08/21/2020    PROT 6.2 08/21/2020    BILITOT 0.4 08/21/2020    BILIDIR <0.2 08/21/2020 IBILI see below 2020    LABALBU 2.9 2020    LABALBU 4.2 2012       Radiology: Ct Abdomen Pelvis Wo Contrast Additional Contrast? None    Result Date: 2020  Patient MRN:  [id number] : 1932 Age: 80 years Gender: Female Order Date:  2020 4:25 PM EXAM: CT ABDOMEN PELVIS WO CONTRAST number of images 379. Technique: Low-dose CT  acquisition technique included one of following options; 1 . Automated exposure control, 2. Adjustment of MA and or KV according to patient's size or 3. Use of iterative reconstruction. INDICATION:  renal failure, r/o obstruction, r/o hydro renal failure, r/o obstruction, r/o hydro COMPARISON: 2013 FINDINGS: The lung bases demonstrate minimal atelectasis/groundglass opacities. There is coronary artery calcification. Liver is normal. Gallbladder is distended. Spleen, pancreas, and adrenals are normal. The kidneys are somewhat atrophic with the large cystic lesions bilaterally, the largest one in the right kidney measuring 3.5 cm. Degenerative changes are identified in the lumbar spine with a nearly 40% compression deformity of the inferior endplate of I58 without significant change. Pelvis. Bladder is partially distended. There is constipation with a distended thickened rectum. The appendix is not identified. Constipation with thickened distended rectum concerning for proctitis. There is no hydronephrosis or obstructing uropathy. Kidneys are somewhat atrophic. Atelectasis/infiltrates and groundglass densities in the lung bases which may be due to mild CHF and or pneumonia. Ct Head Wo Contrast    Result Date: 2020  Patient MRN:  [id number] : 1932 Age: 80 years Gender: Female Order Date:  2020 4:25 PM EXAM: CT HEAD WO CONTRAST INDICATION:  AMS AMS  COMPARISON: CT head without contrast, 2017 FINDINGS: PARENCHYMA:No mass effect, edema or hemorrhage. Moderate volume loss is seen in the cerebrum with mild to moderate chronic microvascular ischemic changes. VENTRICLES:No hydrocephalus. CALVARIUM:The calvarium is intact without fracture or focal lesion. SINUSES:The visualized paranasal sinuses and mastoids are clear. No acute intracranial abnormality. 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 Staphylococcus coagulase negative    Constipation:  -+ BM would recommend starting senna-s 2 tablets at bedtime routinely     Acute kidney injury:  - improving, nephrology following    Palliative medicine encounter  I attempted to call patient's  on file from her facesheet at [facility] facility, Amber Osler at [phone]. There was no answer and no answering service or machine available. I called patient's friend on file Courtney Gtz at [phone] and was able to speak with her. She is a friend of the patient's since they were in first grade. She reports the patient and her brother were never  and have no kids. The only next of kin she is aware of is a cousin she believes is named Jacqueline. She identified patient's brother Arpita Watson went to the nursing home for assistance as he was just having difficulty completing his ADLs independently and that he does not have dementia and would as far she is aware.   Case management/ called facility to inquire about his mental status and level of consciousness and if he is able to be a decision maker for the patient. I additionally called the  and attempt to get a copy of the power of  forms to see if the patient has an alternate identified. It would likely be beneficial for follow-up with the brother to obtain their cousin Jacqueline's information as a potential next of kin. Additionally will update our palliative medicine . Family Meeting:  Participants:No family meeting held today  Family meeting was held to discuss:advanced care planning    Goals of care: continue current management and to be determined  Advanced Directives: facility reporting they can not access [facility] patient is outside of facility, called  to request, no answer, Living Will, HC-POA and Documents requested    Surrogate/Legal NOK: Extended Family:brother  Contacts:  China Lucas [phone] (number listed on facility face sheet but he is now living at [facility])  [facility]    Spiritual assessment: No spiritual distress identified   Bereavement and grief: to be determined    Greater than 51% of time spent, total 15 minutes in counseling and coordination of care at the bedside and telephone conversation regarding see above. Discharge planning: to be determined    Gurvinder Cloud PA-C  Palliative Medicine      Thank you for allowing Palliative Medicine to participate in the care of Jackie Bourne. Note: This report was completed using computerize voiced recognition software. Every effort has been made to ensure accuracy; however, inadvertent computerized transcription errors may be present.

## 2020-08-21 NOTE — PLAN OF CARE
Problem: Falls - Risk of:  Goal: Will remain free from falls  Description: Will remain free from falls  Outcome: Met This Shift  Goal: Absence of physical injury  Description: Absence of physical injury  Outcome: Met This Shift     Problem: Restraint Use - Nonviolent/Non-Self-Destructive Behavior:  Goal: Absence of restraint indications  Description: Absence of restraint indications  Outcome: Not Met This Shift  Goal: Absence of restraint-related injury  Description: Absence of restraint-related injury  Outcome: Met This Shift     Problem: Injury - Risk of, Physical Injury:  Goal: Will remain free from falls  Description: Will remain free from falls  Outcome: Met This Shift  Goal: Absence of physical injury  Description: Absence of physical injury  Outcome: Met This Shift

## 2020-08-21 NOTE — PROGRESS NOTES
from Last 3 Encounters:   08/19/20 174 lb (78.9 kg)   01/08/17 180 lb (81.6 kg)   10/14/14 175 lb (79.4 kg)       Constitutional:  Pt is in no acute distress  Head: normocephalic, atraumatic  Neck: no JVD  Cardiovascular: regular rate and rhythm, no murmurs, gallops, or rubs  Respiratory:  No rales, rhochi, or wheezes  Gastrointestinal:  Soft, nontender, nondistended, bowel sounds x 4  Ext: No edema  Skin: dry, no rash  Neuro: confused    MEDS (scheduled):    potassium chloride  40 mEq Oral BID WC    metoprolol succinate  50 mg Oral Daily    allopurinol  100 mg Oral Daily    ferrous sulfate  325 mg Oral Daily with breakfast    heparin (porcine)  5,000 Units Subcutaneous Q8H    pantoprazole  40 mg Oral QAM AC       MEDS (infusions):   sodium bicarbonate infusion 100 mL/hr at 08/20/20 2250       MEDS (prn):  diphenhydrAMINE, HYDROcodone-acetaminophen, ondansetron, acetaminophen, mineral oil-hydrophilic petrolatum    DATA:    Recent Labs     08/19/20  0555 08/20/20  0433 08/21/20  0657   WBC 7.9 6.4 6.4   HGB 12.4 11.5 11.2*   HCT 36.8 34.7 32.5*   .2* 103.0* 100.3*    337 301     Recent Labs     08/18/20  1441 08/19/20  0555 08/19/20  2102 08/20/20  0433    143 138 144   K 4.6 3.8 3.3* 3.1*   CL 98 105 101 104   CO2 18* 14* 17* 20*   * 130* 125* 118*   CREATININE 7.1* 7.0* 6.5* 6.5*   LABGLOM 5 6 6 6   GLUCOSE 228* 109* 131* 94   CALCIUM 10.5* 9.9 9.5 9.3   ALT 14 13  --  12   AST 29 15  --  19   BILIDIR  --  <0.2  --  <0.2   BILITOT 0.3 0.4  --  0.3   ALKPHOS 106* 101  --  92   MG  --  1.5*  --  2.0   PHOS  --  4.3  --  3.8       Lab Results   Component Value Date    LABALBU 3.1 (L) 08/20/2020    LABALBU 3.3 (L) 08/19/2020    LABALBU 3.5 08/18/2020     Lab Results   Component Value Date    TSH 2.240 08/19/2020       Iron Studies  No results found for: IRON, TIBC, FERRITIN  Vitamin B-12   Date Value Ref Range Status   08/19/2020 >2000 (H) 211 - 946 pg/mL Final     Folate   Date Value Ref Range Status   08/19/2020 >20.0 4.8 - 24.2 ng/mL Final       Vit D, 25-Hydroxy   Date Value Ref Range Status   07/14/2020 30 30 - 100 ng/mL Final     Comment:     <20 ng/mL. ........... Quinter[place] Quince Deficient  20-30 ng/mL. ......... Quintero Quince Insufficient   ng/mL. ........ Quintero Quince Sufficient  >100 ng/mL. .......... Quintero Quince Toxic       No results found for: PTH    No components found for: URIC    Lab Results   Component Value Date    COLORU Yellow 07/16/2020    NITRU POSITIVE 07/16/2020    GLUCOSEU Negative 07/16/2020    KETUA Negative 07/16/2020    UROBILINOGEN 0.2 07/16/2020    BILIRUBINUR Negative 07/16/2020       No results found for: [place]      IMPRESSION/RECOMMENDATIONS:      1. Stage III Acute kidney injury-  Likely pre-renal in the setting of poor po intake/ dehydration   Baseline Cr 0.7 in July 2020  Abdominal CT no hydro, atrophic kidneys  FeNa >1%  Continue IVF  Follow labs daily  UO 875mL in last 24 hours  Cr 7.1->7.0->6.5     2. Metabolic acidosis-  In the setting of LETHA  Improving on bicarb drip  Goal CO2 >22     3. Hyperuricemia-  Re-evaluate after IV hydration     4.  Hypomagnesemia with hypokalemia-   Mag 2.0 s/p supplementation  K 3.1- will replace  Follow    TIKA Chaparro - CNP     Patient examined , alert responsive   Po intake seems to be improving     garcia draining clear urine   Patient now making urine   Responding to hydration /acidosis better on bicarb drip   Hypokalemia - will replace K   Dr Susan Nunez

## 2020-08-21 NOTE — PROGRESS NOTES
Patient continues to pull at IV lines, and at catheter tubing. Patient remains agitated and verbally abusive toward staff. Patient remains in soft wrist restraints for her safety and the safety of staff.

## 2020-08-21 NOTE — PROGRESS NOTES
[facility]  Nephrology Progress Note    SUBJECTIVE:   8/21: Patient seen and examined. K and PO4 low today, being replaced.  Renal function slowly improving    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 [facility] [address]    Closed displaced supracondylar fracture with intracondylar extension of lower end of right femur with malunion    Osteoarthritis of right knee    LETHA [facility] injury) ([facility] [address] [facility]    PVD (peripheral vascular disease) ([facility] [address]    GERD (gastroesophageal reflux disease)    History of bleeding ulcers    Metabolic acidosis    Hyperuricemia        PAST MEDICAL HISTORY:    Past Medical History:   Diagnosis Date    LETHA (acute kidney injury) ([facility] [address] 8/18/2020    Bleeding ulcer 2008    Blood circulation, collateral pain to legs    GERD (gastroesophageal reflux disease)     History of bleeding ulcers     Hypertension     Hyperuricemia 7/04/6199    Metabolic acidosis 6/72/8955    PVD (peripheral vascular disease) ([facility] [address]        DIET:    DIET RENAL;     PHYSICAL EXAM:     Patient Vitals for the past 24 hrs:   BP Temp Temp src Pulse Resp SpO2   08/21/20 0940 (!) 125/52 -- -- 92 16 --   08/21/20 0600 (!) 95/52 98.2 °F (36.8 °C) Axillary 94 16 94 %   08/21/20 0400 123/80 97.6 °F (36.4 °C) Axillary 86 18 93 %   08/21/20 0200 (!) 129/51 98.2 °F (36.8 °C) Axillary 93 18 94 %   08/21/20 0000 120/84 97.4 °F (36.3 °C) Axillary 88 18 96 %   08/20/20 2200 121/79 97.6 °F (36.4 °C) Infrared 79 16 97 %   08/20/20 2000 122/85 97.5 °F (36.4 °C) Infrared 81 17 95 %   08/20/20 1800 120/87 -- -- 88 18 --   08/20/20 1600 (!) 127/57 -- -- 87 18 --   08/20/20 1400 (!) 124/58 -- -- 84 16 --   @      Intake/Output Summary (Last 24 hours) at 8/21/2020 1326  Last data filed at 8/21/2020 0617  Gross per 24 hour   Intake 890 ml   Output 1250 ml   Net -360 ml         Wt Readings from Last Ref Range Status   08/19/2020 >20.0 4.8 - 24.2 ng/mL Final       Vit D, 25-Hydroxy   Date Value Ref Range Status   07/14/2020 30 30 - 100 ng/mL Final     Comment:     <20 ng/mL. ........... Ellen Simpler Deficient  20-30 ng/mL. ......... Ellen Simpler Insufficient   ng/mL. ........ Ellen Simpler Sufficient  >100 ng/mL. .......... Ellen Simpler Toxic       No results found for: PTH    No components found for: URIC    Lab Results   Component Value Date    COLORU Yellow 07/16/2020    NITRU POSITIVE 07/16/2020    GLUCOSEU Negative 07/16/2020    KETUA Negative 07/16/2020    UROBILINOGEN 0.2 07/16/2020    BILIRUBINUR Negative 07/16/2020       No results found for: Marisa Acevedo      IMPRESSION/RECOMMENDATIONS:      1. Stage III Acute kidney injury-  Likely pre-renal in the setting of poor po intake/ dehydration   Baseline Cr 0.7 in July 2020  Abdominal CT no hydro, atrophic kidneys  FeNa >1%  Continue IVF  Follow labs daily  UO 1250mL in last 24 hours  Cr 7.1->7.0->6.5->5.2     2. Metabolic acidosis-  In the setting of LETHA  Improved on bicarb drip  Bicarb based drip now off  CO2 at goal >22     3. Hyperuricemia-  Re-evaluate after IV hydration     4. Hypomagnesemia with hypokalemia-   Mag 1.7, K 2.6  K and Mag being replaced IV  Follow    5.  Hypophosphatemia   In the setting of the decreased intake  Replace phos today  Repeat PO4 in AM    TIKA Dwyer - NP     Patient seen this am alert responsive doing better   U/o > 1200 cc , renal functions better ,, acidosis resolved     Plan as outlined above      [facility]CALERA [facility]

## 2020-08-21 NOTE — PROGRESS NOTES
Pt remains in restraints due to attempted removal of IV line and garcia catheter. Pt is still oriented only to self and confused x3.

## 2020-08-22 NOTE — PLAN OF CARE
Problem: Falls - Risk of:  Goal: Will remain free from falls  Description: Will remain free from falls  Outcome: Met This Shift  Goal: Absence of physical injury  Description: Absence of physical injury  Outcome: Met This Shift     Problem: Restraint Use - Nonviolent/Non-Self-Destructive Behavior:  Goal: Absence of restraint-related injury  Description: Absence of restraint-related injury  8/22/2020 0423 by Murali Villatoro RN  Outcome: Met This Shift  8/21/2020 1816 by Lazaro Macario RN  Outcome: Met This Shift  8/21/2020 1816 by Lazaro Macario RN  Outcome: Met This Shift  8/21/2020 1645 by Brad Steel RN  Outcome: Met This Shift     Problem: Skin Integrity:  Goal: Will show no infection signs and symptoms  Description: Will show no infection signs and symptoms  Outcome: Met This Shift  Goal: Absence of new skin breakdown  Description: Absence of new skin breakdown  Outcome: Met This Shift     Problem: Injury - Risk of, Physical Injury:  Goal: Will remain free from falls  Description: Will remain free from falls  Outcome: Met This Shift  Goal: Absence of physical injury  Description: Absence of physical injury  Outcome: Met This Shift

## 2020-08-22 NOTE — PROGRESS NOTES
Patient still attempting to pull at lines, unable to redirect or educate at this time. Bilateral soft upper wrist restraints remain in place, will continue to monitor.

## 2020-08-22 NOTE — PLAN OF CARE
Problem: Restraint Use - Nonviolent/Non-Self-Destructive Behavior:  Goal: Absence of restraint-related injury  Description: Absence of restraint-related injury  8/22/2020 1849 by Maeve Cheung RN  Outcome: Met This Shift  8/22/2020 1709 by Dutch Sevilla RN  Outcome: Met This Shift     Problem: Restraint Use - Nonviolent/Non-Self-Destructive Behavior:  Goal: Absence of restraint indications  Description: Absence of restraint indications  8/22/2020 1849 by Maeve Cheung RN  Outcome: Not Met This Shift  8/22/2020 1709 by Dutch Sevilla RN  Outcome: Not Met This Shift

## 2020-08-22 NOTE — PROGRESS NOTES
Chief Complaint:   AMS, renal failure    Subjective: The patient is alert. Confused. Loud and agitated at times per staff. No new problems overnight. Denies chest pain & SOB . Denies abdominal pain. Tolerating diet. No nausea or vomiting. Objective:    Vitals:    20 0600   BP: 98/62   Pulse: 73   Resp: 20   Temp: 97.9 °F (36.6 °C)   SpO2: 93%     Awake, alert and confused, poor skin turgor, NAD  Heart:  RRR, no murmurs, gallops, or rubs. Lungs:  CTA bilaterally, no wheeze, rales or rhonchi  Abd: bowel sounds present, nontender, nondistended, no masses  Extrem:  No clubbing, cyanosis, or edema  Tele: NSR    Lab Results   Component Value Date     2020    K 3.4 2020    CL 98 2020    CO2 28 2020    BUN 88 2020    CREATININE 4.5 2020    CALCIUM 8.3 2020      Lab Results   Component Value Date    WBC 5.0 2020    RBC 3.22 2020    HGB 11.0 2020    HCT 33.8 2020    .0 2020    MCH 34.2 2020    MCHC 32.5 2020    RDW 13.5 2020     2020    MPV 10.4 2020     PT/INR:    Lab Results   Component Value Date    PROTIME 11.3 2013    INR 1.0 2013     No results for input(s): POCGLU in the last 72 hours. Recent Labs     20  0433 20  0657 20  0521    143 143   K 3.1* 2.6* 3.4*    97* 98   CO2 20* 30* 28   * 102* 88*   LABALBU 3.1* 2.9* 3.0*   CREATININE 6.5* 5.2* 4.5*   CALCIUM 9.3 8.6 8.3*   GFRAA 7 9 11   LABGLOM 6 8 9   GLUCOSE 94 121* 94       Ct Abdomen Pelvis Wo Contrast Additional Contrast? None    Result Date: 2020  Patient MRN:  [id number] : 1932 Age: 80 years Gender: Female Order Date:  2020 4:25 PM EXAM: CT ABDOMEN PELVIS WO CONTRAST number of images 379. Technique: Low-dose CT  acquisition technique included one of following options; 1 . Automated exposure control, 2.  Adjustment of MA and or KV according to patient's size or 3. Use of iterative reconstruction. INDICATION:  renal failure, r/o obstruction, r/o hydro renal failure, r/o obstruction, r/o hydro COMPARISON: 2013 FINDINGS: The lung bases demonstrate minimal atelectasis/groundglass opacities. There is coronary artery calcification. Liver is normal. Gallbladder is distended. Spleen, pancreas, and adrenals are normal. The kidneys are somewhat atrophic with the large cystic lesions bilaterally, the largest one in the right kidney measuring 3.5 cm. Degenerative changes are identified in the lumbar spine with a nearly 40% compression deformity of the inferior endplate of X56 without significant change. Pelvis. Bladder is partially distended. There is constipation with a distended thickened rectum. The appendix is not identified. Constipation with thickened distended rectum concerning for proctitis. There is no hydronephrosis or obstructing uropathy. Kidneys are somewhat atrophic. Atelectasis/infiltrates and groundglass densities in the lung bases which may be due to mild CHF and or pneumonia. Ct Head Wo Contrast    Result Date: 2020  Patient MRN:  [id number] : 1932 Age: 80 years Gender: Female Order Date:  2020 4:25 PM EXAM: CT HEAD WO CONTRAST INDICATION:  AMS AMS  COMPARISON: CT head without contrast, 2017 FINDINGS: PARENCHYMA:No mass effect, edema or hemorrhage. Moderate volume loss is seen in the cerebrum with mild to moderate chronic microvascular ischemic changes. VENTRICLES:No hydrocephalus. CALVARIUM:The calvarium is intact without fracture or focal lesion. SINUSES:The visualized paranasal sinuses and mastoids are clear. No acute intracranial abnormality.       Scheduled Meds:   metoprolol succinate  50 mg Oral Daily    allopurinol  100 mg Oral Daily    ferrous sulfate  325 mg Oral Daily with breakfast    heparin (porcine)  5,000 Units Subcutaneous Q8H    pantoprazole  40 mg Oral QAM AC     Continuous Infusions:   0.9% NaCl with KCl 20 mEq 100 mL/hr at 08/22/20 0539     PRN Meds:.diphenhydrAMINE, HYDROcodone-acetaminophen, ondansetron, acetaminophen, mineral oil-hydrophilic petrolatum    I/O last 3 completed shifts: In: 100 [P.O.:100]  Out: 750 [Urine:750]  No intake/output data recorded.     Intake/Output Summary (Last 24 hours) at 8/22/2020 5257  Last data filed at 8/22/2020 1015  Gross per 24 hour   Intake 100 ml   Output 750 ml   Net -650 ml       Assessment:    Active Hospital Problems    Diagnosis    Hypertension [I10]     Priority: Medium    Osteoarthritis [M19.90]     Priority: Medium    Metabolic acidosis [R20.4]    Hyperuricemia [E79.0]    GERD (gastroesophageal reflux disease) [K21.9]    History of bleeding ulcers [Z87.11]    [facility]HA [facility]acute [facility]) ([facility] [address] [N17.9]    PVD (peripheral vascular disease) (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 [address] [F02.80]       Plan:  Continue IVF             Remains confused and agitated             Oral intake remains poor             RFT improving slowly             Renal f/u reviewed              Condition remains serious             Palliative care consult reviewed             Replace low K+             May need to consider PEG if PO intake remains poor                        Claudia Hancock,   8:07 AM  8/22/2020

## 2020-08-22 NOTE — PROGRESS NOTES
[facility]  Nephrology Progress Note    SUBJECTIVE:   8/21: Patient seen and examined. K and PO4 low today, being replaced.  Renal function slowly improving  8/22: pt seen in room    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 ([facility] [address]    Closed displaced supracondylar fracture with intracondylar extension of lower end of right femur with malunion    Osteoarthritis of right knee    LETHA (acute kidney injury) ([facility] [address]    PVD (peripheral vascular disease) ([facility] [address]    GERD (gastroesophageal reflux disease)    History of bleeding ulcers    Metabolic acidosis    Hyperuricemia        PAST MEDICAL HISTORY:    Past Medical History:   Diagnosis Date    LETHA (acute kidney injury) ([facility] [address] 8/18/2020    Bleeding ulcer 2008    Blood circulation, collateral pain to legs    GERD (gastroesophageal reflux disease)     History of bleeding ulcers     Hypertension     Hyperuricemia 4/21/5447    Metabolic acidosis 0/79/8587    PVD (peripheral vascular disease) ([facility] [address]        DIET:    DIET RENAL;     PHYSICAL EXAM:     Patient Vitals for the past 24 hrs:   BP Temp Temp src Pulse Resp SpO2   08/22/20 1000 -- -- -- 79 16 --   08/22/20 0800 94/60 98 °F (36.7 °C) Temporal 75 16 --   08/22/20 0600 98/62 97.9 °F (36.6 °C) Axillary 73 20 93 %   08/22/20 0400 100/62 97.8 °F (36.6 °C) Axillary 85 17 93 %   08/22/20 0200 102/64 98.1 °F (36.7 °C) Axillary 83 16 92 %   08/22/20 0000 98/64 97.2 °F (36.2 °C) Temporal 72 14 94 %   08/21/20 2216 100/60 98.4 °F (36.9 °C) Axillary 93 18 95 %   08/21/20 2000 94/62 97.2 °F (36.2 °C) Temporal 84 20 93 %   08/21/20 1800 96/64 98.2 °F (36.8 °C) Oral 90 18 --   08/21/20 1400 94/60 98.4 °F (36.9 °C) Oral 92 18 --   @      Intake/Output Summary (Last 24 hours) at 8/22/2020 1251  Last data filed at 8/22/2020 0930  Gross per 24 hour   Intake 220 ml   Output 750 ml   Net -530 ml         Wt Readings from Last 3 Encounters:   08/19/20 174 lb (78.9 kg)   01/08/17 180 lb (81.6 kg)   10/14/14 175 lb (79.4 kg)       Constitutional:  Pt is in no acute distress  Head: normocephalic, atraumatic  Neck: no JVD  Cardiovascular: regular rate and rhythm, no murmurs, gallops, or rubs  Respiratory:  No rales, rhochi, or wheezes  Gastrointestinal:  Soft, nontender, nondistended, bowel sounds x 4  Ext: No edema  Skin: dry, no rash  Neuro: confused    MEDS (scheduled):    metoprolol succinate  50 mg Oral Daily    allopurinol  100 mg Oral Daily    ferrous sulfate  325 mg Oral Daily with breakfast    heparin (porcine)  5,000 Units Subcutaneous Q8H    pantoprazole  40 mg Oral QAM AC       MEDS (infusions):   0.9% NaCl with KCl 20 mEq 100 mL/hr at 08/22/20 0539       MEDS (prn):  diphenhydrAMINE, HYDROcodone-acetaminophen, ondansetron, acetaminophen, mineral oil-hydrophilic petrolatum    DATA:    Recent Labs     08/20/20  0433 08/21/20  0657 08/22/20  0521   WBC 6.4 6.4 5.0   HGB 11.5 11.2* 11.0*   HCT 34.7 32.5* 33.8*   .0* 100.3* 105.0*    301 286     Recent Labs     08/20/20  0433 08/21/20  0657 08/22/20  0521    143 143   K 3.1* 2.6* 3.4*    97* 98   CO2 20* 30* 28   * 102* 88*   CREATININE 6.5* 5.2* 4.5*   LABGLOM 6 8 9   GLUCOSE 94 121* 94   CALCIUM 9.3 8.6 8.3*   ALT 12 11 14   AST 19 19 26   BILIDIR <0.2 <0.2 <0.2   BILITOT 0.3 0.4 0.4   ALKPHOS 92 87 91   MG 2.0 1.7 2.0   PHOS 3.8 2.0* 3.9       Lab Results   Component Value Date    LABALBU 3.0 (L) 08/22/2020    LABALBU 2.9 (L) 08/21/2020    LABALBU 3.1 (L) 08/20/2020     Lab Results   Component Value Date    TSH 2.240 08/19/2020       Iron Studies  No results found for: IRON, TIBC, FERRITIN  Vitamin B-12   Date Value Ref Range Status   08/19/2020 >2000 (H) 211 - 946 pg/mL Final     Folate   Date Value Ref Range Status   08/19/2020 >20.0 4.8 - 24.2 ng/mL Final       Vit D, 25-Hydroxy   Date Value Ref Range Status   07/14/2020 30 30 - 100 ng/mL Final     Comment:     <20 ng/mL. ........... Marleny Andino Deficient  20-30 ng/mL. ......... Marleny Webba Insufficient   ng/mL. ........ Marleny Webba Sufficient  >100 ng/mL. .......... Marleny Webba Toxic       No results found for: PTH    No components found for: URIC    Lab Results   Component Value Date    COLORU Yellow 07/16/2020    NITRU POSITIVE 07/16/2020    GLUCOSEU Negative 07/16/2020    KETUA Negative 07/16/2020    UROBILINOGEN 0.2 07/16/2020    BILIRUBINUR Negative 07/16/2020       No results found for: Laly Lang      IMPRESSION/RECOMMENDATIONS:      1. Stage III Acute kidney injury  Likely pre-renal in the setting of poor po intake/ dehydration   Baseline Cr 0.7 in July 2020  Abdominal CT no hydro, atrophic kidneys  FeNa >1%  Continue IVF  Follow labs daily  UO 1250mL in last 24 hours  Cr 7.1->7.0->6.5->5.2>4. 5     2. Metabolic acidosis  In the setting of LETHA  Improved on bicarb drip  Bicarb based drip now off  CO2 at goal >22     3. Hyperuricemia  Re-evaluate after IV hydration     4. Hypomagnesemia with hypokalemia-  K and Mag replace prn  Follow    5.  Hypophosphatemia   In the setting of the decreased intake  Replace prn    Tanner Argueta MD

## 2020-08-23 NOTE — PROGRESS NOTES
Pt calm at this time. Continues to pull at iv sites and oxygen. Soft restraints intact to bilateral wrists. Pt repositioned at this time.  Will continue to monitor

## 2020-08-23 NOTE — PROGRESS NOTES
Chief Complaint:   AMS, renal failure    Subjective: The patient is alert. Confused. Loud and agitated at times per staff. No new problems overnight. Denies chest pain & SOB . Denies abdominal pain. Tolerating diet. No nausea or vomiting. Objective:    Vitals:    20 0745   BP: (!) 143/60   Pulse: 83   Resp: 18   Temp: 97.1 °F (36.2 °C)   SpO2: 93%     Awake, alert and confused, poor skin turgor, NAD  Heart:  RRR, no murmurs, gallops, or rubs. Lungs:  CTA bilaterally, no wheeze, rales or rhonchi  Abd: bowel sounds present, nontender, nondistended, no masses  Extrem:  No clubbing, cyanosis, or edema  Tele: NSR    Lab Results   Component Value Date     2020    K 3.4 2020    CL 98 2020    CO2 28 2020    BUN 88 2020    CREATININE 4.5 2020    CALCIUM 8.3 2020      Lab Results   Component Value Date    WBC 5.0 2020    RBC 3.22 2020    HGB 11.0 2020    HCT 33.8 2020    .0 2020    MCH 34.2 2020    MCHC 32.5 2020    RDW 13.5 2020     2020    MPV 10.4 2020     PT/INR:    Lab Results   Component Value Date    PROTIME 11.3 2013    INR 1.0 2013     No results for input(s): POCGLU in the last 72 hours. Recent Labs     20  0657 20  0521    143   K 2.6* 3.4*   CL 97* 98   CO2 30* 28   * 88*   LABALBU 2.9* 3.0*   CREATININE 5.2* 4.5*   CALCIUM 8.6 8.3*   GFRAA 9 11   LABGLOM 8 9   GLUCOSE 121* 94       Ct Abdomen Pelvis Wo Contrast Additional Contrast? None    Result Date: 2020  Patient MRN:  [id number] : 1932 Age: 80 years Gender: Female Order Date:  2020 4:25 PM EXAM: CT ABDOMEN PELVIS WO CONTRAST number of images 379. Technique: Low-dose CT  acquisition technique included one of following options; 1 . Automated exposure control, 2. Adjustment of MA and or KV according to patient's size or 3. Use of iterative reconstruction.  INDICATION: renal failure, r/o obstruction, r/o hydro renal failure, r/o obstruction, r/o hydro COMPARISON: 2013 FINDINGS: The lung bases demonstrate minimal atelectasis/groundglass opacities. There is coronary artery calcification. Liver is normal. Gallbladder is distended. Spleen, pancreas, and adrenals are normal. The kidneys are somewhat atrophic with the large cystic lesions bilaterally, the largest one in the right kidney measuring 3.5 cm. Degenerative changes are identified in the lumbar spine with a nearly 40% compression deformity of the inferior endplate of G21 without significant change. Pelvis. Bladder is partially distended. There is constipation with a distended thickened rectum. The appendix is not identified. Constipation with thickened distended rectum concerning for proctitis. There is no hydronephrosis or obstructing uropathy. Kidneys are somewhat atrophic. Atelectasis/infiltrates and groundglass densities in the lung bases which may be due to mild CHF and or pneumonia. Ct Head Wo Contrast    Result Date: 2020  Patient MRN:  [id number] : 1932 Age: 80 years Gender: Female Order Date:  2020 4:25 PM EXAM: CT HEAD WO CONTRAST INDICATION:  AMS AMS  COMPARISON: CT head without contrast, 2017 FINDINGS: PARENCHYMA:No mass effect, edema or hemorrhage. Moderate volume loss is seen in the cerebrum with mild to moderate chronic microvascular ischemic changes. VENTRICLES:No hydrocephalus. CALVARIUM:The calvarium is intact without fracture or focal lesion. SINUSES:The visualized paranasal sinuses and mastoids are clear. No acute intracranial abnormality.       Scheduled Meds:   midodrine  5 mg Oral TID WC    metoprolol succinate  50 mg Oral Daily    allopurinol  100 mg Oral Daily    ferrous sulfate  325 mg Oral Daily with breakfast    heparin (porcine)  5,000 Units Subcutaneous Q8H    pantoprazole  40 mg Oral QAM AC     Continuous Infusions:    PRN Meds:.diphenhydrAMINE,

## 2020-08-23 NOTE — PROGRESS NOTES
[facility]  Nephrology Progress Note    SUBJECTIVE:   8/21: Patient seen and examined. K and PO4 low today, being replaced. Renal function slowly improving  8/22: pt seen in room  8/23: pt agitated.      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 ([facility] [address]    Closed displaced supracondylar fracture with intracondylar extension of lower end of right femur with malunion    Osteoarthritis of right knee    LETHA (acute kidney injury) ([facility] [address]    PVD (peripheral vascular disease) ([facility] [address]    GERD (gastroesophageal reflux disease)    History of bleeding ulcers    Metabolic acidosis    Hyperuricemia        PAST MEDICAL HISTORY:    Past Medical History:   Diagnosis Date    LETHA (acute kidney injury) ([facility] [address] 8/18/2020    Bleeding ulcer 2008    Blood circulation, collateral pain to legs    GERD (gastroesophageal reflux disease)     History of bleeding ulcers     Hypertension     Hyperuricemia 3/67/1325    Metabolic acidosis 0/12/3886    PVD (peripheral vascular disease) ([facility] [address]        DIET:    DIET RENAL;     PHYSICAL EXAM:     Patient Vitals for the past 24 hrs:   BP Temp Temp src Pulse Resp SpO2 Height Weight   08/23/20 1145 138/64 96.9 °F (36.1 °C) Temporal 83 12 92 % -- --   08/23/20 1000 (!) 140/66 97 °F (36.1 °C) Temporal 84 18 -- -- --   08/23/20 0745 (!) 143/60 97.1 °F (36.2 °C) Temporal 83 18 93 % -- --   08/23/20 0600 100/62 98.8 °F (37.1 °C) Temporal 88 18 97 % -- --   08/23/20 0400 (!) 98/50 98.7 °F (37.1 °C) Temporal 84 18 95 % -- --   08/23/20 0200 (!) 101/52 98.5 °F (36.9 °C) Temporal 82 18 96 % -- --   08/23/20 0000 (!) 100/50 98.1 °F (36.7 °C) Temporal 80 18 98 % -- --   08/22/20 2315 (!) 102/54 97.7 °F (36.5 °C) Temporal 84 18 96 % -- --   08/22/20 2200 (!) 102/54 97.7 °F (36.5 °C) Temporal 84 18 96 % -- --   08/22/20 2128 -- -- -- -- -- -- 5' 3\" (1.6 m) 172 lb (78 kg)   08/22/20 2000 (!) 112/54 97.8 °F (36.6 °C) Temporal 80 18 98 % -- --   08/22/20 1800 (!) 82/50 97 °F (36.1 °C) Temporal 79 16 -- -- --   08/22/20 1600 (!) 86/52 97.2 °F (36.2 °C) Temporal 75 16 99 % -- --   08/22/20 1400 (!) 88/56 97 °F (36.1 °C) Temporal 79 16 97 % -- --   @      Intake/Output Summary (Last 24 hours) at 8/23/2020 1248  Last data filed at 8/23/2020 1000  Gross per 24 hour   Intake 110 ml   Output 1250 ml   Net -1140 ml         Wt Readings from Last 3 Encounters:   08/22/20 172 lb (78 kg)   01/08/17 180 lb (81.6 kg)   10/14/14 175 lb (79.4 kg)       Constitutional:  Pt is in no acute distress  Head: normocephalic, atraumatic  Neck: no JVD  Cardiovascular: regular rate and rhythm, no murmurs, gallops, or rubs  Respiratory:  No rales, rhochi, or wheezes  Gastrointestinal:  Soft, nontender, nondistended, bowel sounds x 4  Ext: No edema  Skin: dry, no rash  Neuro: confused    MEDS (scheduled):    risperiDONE  0.5 mg Oral Daily    midodrine  5 mg Oral TID WC    metoprolol succinate  50 mg Oral Daily    allopurinol  100 mg Oral Daily    ferrous sulfate  325 mg Oral Daily with breakfast    heparin (porcine)  5,000 Units Subcutaneous Q8H    pantoprazole  40 mg Oral QAM AC       MEDS (infusions):      MEDS (prn):  diphenhydrAMINE, HYDROcodone-acetaminophen, ondansetron, acetaminophen, mineral oil-hydrophilic petrolatum    DATA:    Recent Labs     08/21/20  0657 08/22/20  0521 08/23/20  0758   WBC 6.4 5.0 6.6   HGB 11.2* 11.0* 11.0*   HCT 32.5* 33.8* 34.6   .3* 105.0* 107.5*    286 282     Recent Labs     08/21/20  0657 08/22/20  0521 08/23/20  0758    143 147*   K 2.6* 3.4* 3.5   CL 97* 98 106   CO2 30* 28 25   * 88* 73*   CREATININE 5.2* 4.5* 3.9*   LABGLOM 8 9 11   GLUCOSE 121* 94 84   CALCIUM 8.6 8.3* 8.4*   ALT 11 14 16   AST 19 26 31   BILIDIR <0.2 <0.2 0.2   BILITOT 0.4 0.4 0.5   ALKPHOS 87 91 91   MG 1.7 2.0 1.7   PHOS 2.0* 3.9 3.5       Lab Results

## 2020-08-23 NOTE — FLOWSHEET NOTE
ROM provided at this time. Patient ripped stethoscope from Rn's ears while getting blood pressure. Became verbally aggressive and grabbed Rn's arm. Attempted to pull at her IV site. Will continue BL upper extremity soft restraints.

## 2020-08-23 NOTE — PROGRESS NOTES
Pt confused. Does not answer questions appropriately. Pulling at iv lines and oxygen tubing. Soft restraints intact repositioned as needed.  Will continue to monitor

## 2020-08-23 NOTE — PLAN OF CARE
Problem: Falls - Risk of:  Goal: Will remain free from falls  Description: Will remain free from falls  Outcome: Met This Shift  Goal: Absence of physical injury  Description: Absence of physical injury  Outcome: Met This Shift     Problem: Restraint Use - Nonviolent/Non-Self-Destructive Behavior:  Goal: Absence of restraint indications  Description: Absence of restraint indications  8/22/2020 2326 by Herminio Ruvalcaba RN  Outcome: Not Met This Shift  8/22/2020 1849 by Xochilt Trammell RN  Outcome: Not Met This Shift  8/22/2020 1709 by Samuel Mcginnis RN  Outcome: Not Met This Shift  Goal: Absence of restraint-related injury  Description: Absence of restraint-related injury  8/22/2020 2326 by Herminio Ruvalcaba RN  Outcome: Met This Shift  8/22/2020 1849 by Xochilt Trammell RN  Outcome: Met This Shift  8/22/2020 1709 by Samuel Mcginnis RN  Outcome: Met This Shift     Problem: Skin Integrity:  Goal: Will show no infection signs and symptoms  Description: Will show no infection signs and symptoms  8/22/2020 2326 by Herminio Ruvalcaba RN  Outcome: Met This Shift  8/22/2020 1709 by Samuel Mcginnis RN  Outcome: Met This Shift  Goal: Absence of new skin breakdown  Description: Absence of new skin breakdown  8/22/2020 2326 by Herminio Ruvalcaba RN  Outcome: Met This Shift  8/22/2020 1709 by Samuel Mcginnis RN  Outcome: Met This Shift     Problem: Confusion - Acute:  Goal: Absence of continued neurological deterioration signs and symptoms  Description: Absence of continued neurological deterioration signs and symptoms  Outcome: Not Met This Shift  Goal: Mental status will be restored to baseline  Description: Mental status will be restored to baseline  Outcome: Not Met This Shift     Problem: Injury - Risk of, Physical Injury:  Goal: Will remain free from falls  Description: Will remain free from falls  Outcome: Met This Shift  Goal: Absence of physical injury  Description: Absence of physical injury  Outcome: Met This Shift

## 2020-08-24 NOTE — PLAN OF CARE
Problem: Falls - Risk of:  Goal: Will remain free from falls  Description: Will remain free from falls  8/24/2020 0931 by Devorah Valerio RN  Outcome: Met This Shift  8/24/2020 0033 by Riaz Fernández RN  Outcome: Met This Shift  Goal: Absence of physical injury  Description: Absence of physical injury  8/24/2020 0931 by Devorah Valerio RN  Outcome: Met This Shift  8/24/2020 0033 by Riaz Fernández RN  Outcome: Met This Shift     Problem: Restraint Use - Nonviolent/Non-Self-Destructive Behavior:  Goal: Absence of restraint indications  Description: Absence of restraint indications  8/24/2020 0931 by Devorah Valerio RN  Outcome: Not Met This Shift  8/24/2020 0033 by Riaz Fernández RN  Outcome: Not Met This Shift  Goal: Absence of restraint-related injury  Description: Absence of restraint-related injury  8/24/2020 0931 by Devorah Valerio RN  Outcome: Met This Shift  8/24/2020 0033 by Riaz Fernández RN  Outcome: Met This Shift     Problem: Skin Integrity:  Goal: Will show no infection signs and symptoms  Description: Will show no infection signs and symptoms  8/24/2020 0931 by Devorah Valerio RN  Outcome: Met This Shift  8/24/2020 0033 by Riaz Fernández RN  Outcome: Met This Shift  Goal: Absence of new skin breakdown  Description: Absence of new skin breakdown  8/24/2020 0931 by Devorah Valerio RN  Outcome: Met This Shift  8/24/2020 0033 by Riaz Fernández RN  Outcome: Met This Shift     Problem: Confusion - Acute:  Goal: Absence of continued neurological deterioration signs and symptoms  Description: Absence of continued neurological deterioration signs and symptoms  8/24/2020 0931 by Devorah Valerio RN  Outcome: Met This Shift  8/24/2020 0033 by Riaz Fernández RN  Outcome: Met This Shift  Goal: Mental status will be restored to baseline  Description: Mental status will be restored to baseline  8/24/2020 0931 by Devorah Valerio RN  Outcome: Met This Shift  8/24/2020 0033 by Riaz Fernández RN  Outcome: Not Met This Shift Problem: Discharge Planning:  Goal: Ability to perform activities of daily living will improve  Description: Ability to perform activities of daily living will improve  Outcome: Not Met This Shift  Goal: Participates in care planning  Description: Participates in care planning  Outcome: Not Met This Shift     Problem: Injury - Risk of, Physical Injury:  Goal: Will remain free from falls  Description: Will remain free from falls  8/24/2020 0931 by Sj Blair RN  Outcome: Met This Shift  8/24/2020 0033 by Dion Valdivia RN  Outcome: Met This Shift  Goal: Absence of physical injury  Description: Absence of physical injury  8/24/2020 0931 by Sj Blair RN  Outcome: Met This Shift  8/24/2020 0033 by Dion Valdivia RN  Outcome: Met This Shift     Problem: Mood - Altered:  Goal: Mood stable  Description: Mood stable  Outcome: Met This Shift  Goal: Absence of abusive behavior  Description: Absence of abusive behavior  Outcome: Met This Shift  Goal: Verbalizations of feeling emotionally comfortable while being cared for will increase  Description: Verbalizations of feeling emotionally comfortable while being cared for will increase  Outcome: Not Met This Shift     Problem: Psychomotor Activity - Altered:  Goal: Absence of psychomotor disturbance signs and symptoms  Description: Absence of psychomotor disturbance signs and symptoms  Outcome: Met This Shift     Problem: Sensory Perception - Impaired:  Goal: Demonstrations of improved sensory functioning will increase  Description: Demonstrations of improved sensory functioning will increase  Outcome: Met This Shift  Goal: Decrease in sensory misperception frequency  Description: Decrease in sensory misperception frequency  Outcome: Met This Shift  Goal: Able to refrain from responding to false sensory perceptions  Description: Able to refrain from responding to false sensory perceptions  Outcome: Met This Shift  Goal: Demonstrates accurate environmental perceptions  Description: Demonstrates accurate environmental perceptions  Outcome: Met This Shift  Goal: Able to distinguish between reality-based and nonreality-based thinking  Description: Able to distinguish between reality-based and nonreality-based thinking  Outcome: Met This Shift  Goal: Able to interrupt nonreality-based thinking  Description: Able to interrupt nonreality-based thinking  Outcome: Met This Shift     Problem: Sleep Pattern Disturbance:  Goal: Appears well-rested  Description: Appears well-rested  Outcome: Met This Shift     Problem: Musculor/Skeletal Functional Status  Goal: Highest potential functional level  Outcome: Met This Shift  Goal: Absence of falls  Outcome: Met This Shift

## 2020-08-24 NOTE — PROGRESS NOTES
Chief Complaint:   AMS, renal failure    Subjective: The patient is alert. Confused. Loud and agitated at times per staff. No new problems overnight. Denies chest pain & SOB . Denies abdominal pain. Tolerating diet. No nausea or vomiting. Objective:    Vitals:    20 0845   BP:    Pulse:    Resp:    Temp:    SpO2: 93%     Awake, alert and confused, poor skin turgor, NAD  Heart:  RRR, no murmurs, gallops, or rubs. Lungs:  CTA bilaterally, no wheeze, rales or rhonchi  Abd: bowel sounds present, nontender, nondistended, no masses  Extrem:  No clubbing, cyanosis, or edema  Tele: NSR    Lab Results   Component Value Date     2020    K 3.3 2020     2020    CO2 23 2020    BUN 63 2020    CREATININE 3.6 2020    CALCIUM 8.7 2020      Lab Results   Component Value Date    WBC 7.7 2020    RBC 3.35 2020    HGB 11.6 2020    HCT 35.8 2020    .9 2020    MCH 34.6 2020    MCHC 32.4 2020    RDW 13.6 2020     2020    MPV 10.4 2020     PT/INR:    Lab Results   Component Value Date    PROTIME 11.3 2013    INR 1.0 2013     No results for input(s): POCGLU in the last 72 hours. Recent Labs     20  0521 20  0758 20  0441    147* 145   K 3.4* 3.5 3.3*   CL 98 106 106   CO2 28 25 23   BUN 88* 73* 63*   LABALBU 3.0* 2.7* 2.8*   CREATININE 4.5* 3.9* 3.6*   CALCIUM 8.3* 8.4* 8.7   GFRAA 11 13 14   LABGLOM 9 11 12   GLUCOSE 94 84 96       Ct Abdomen Pelvis Wo Contrast Additional Contrast? None    Result Date: 2020  Patient MRN:  [id number] : 1932 Age: 80 years Gender: Female Order Date:  2020 4:25 PM EXAM: CT ABDOMEN PELVIS WO CONTRAST number of images 379. Technique: Low-dose CT  acquisition technique included one of following options; 1 . Automated exposure control, 2. Adjustment of MA and or KV according to patient's size or 3.  Use of iterative with breakfast    heparin (porcine)  5,000 Units Subcutaneous Q8H    pantoprazole  40 mg Oral QAM AC     Continuous Infusions:   sodium chloride 75 mL/hr at 08/24/20 0558     PRN Meds:.diphenhydrAMINE, HYDROcodone-acetaminophen, ondansetron, acetaminophen, mineral oil-hydrophilic petrolatum    I/O last 3 completed shifts: In: 1246.3 [P.O.:180; I.V.:1066.3]  Out: 1275 [Urine:1275]  No intake/output data recorded.     Intake/Output Summary (Last 24 hours) at 8/24/2020 0947  Last data filed at 8/24/2020 0600  Gross per 24 hour   Intake 1246.25 ml   Output 1275 ml   Net -28.75 ml       Assessment:    Active Hospital Problems    Diagnosis    Hypertension [I10]     Priority: Medium    Osteoarthritis [M19.90]     Priority: Medium    Metabolic acidosis [U58.8]    Hyperuricemia [E79.0]    GERD (gastroesophageal reflux disease) [K21.9]    History of bleeding ulcers [Z87.11]    LETHA (acute kidney injury) ([facility] [address] [N17.9]    PVD (peripheral vascular disease) ([facility] [address] [I73.9]    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

## 2020-08-24 NOTE — PLAN OF CARE
Discussed with [address], POA. I have reviewed the risks, benefits, and options with him. He is in agreement to have the PEG tube done. Will need to hold heparin at midnight; Will schedule for tomorrow. Permit for PEG/EGD possible biopsy.

## 2020-08-24 NOTE — PROGRESS NOTES
PALLIATIVE MEDICINE    Chart reviewed. Patient with significant agitation. Noted reportedly advanced directives obtained from facility with patient's  noted as alternate decision-maker. Additional decisions in regards to goals of care may include discussion in regards to placement of PEG tube. Palliative medicine will follow and assist with establishing goals of care in regards to placement of a PEG tube. Additionally, patient's friend reported that patient's brother is likely capable of making decisions for her and does not have dementia or other cognitive impairment.     Arelis Lainez PA-C

## 2020-08-24 NOTE — PROGRESS NOTES
[facility]  Nephrology Progress Note    SUBJECTIVE:   8/21: Patient seen and examined. K and PO4 low today, being replaced. Renal function slowly improving  8/22: pt seen in room  8/23: pt agitated. 8/24: Resting in bed. Pleasant at this time. Denies sob, chest pain, abd pain.     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 [facility] [address]    Closed displaced supracondylar fracture with intracondylar extension of lower end of right femur with malunion    Osteoarthritis of right knee    LETHA (acute kidney injury) ([facility] [address]    PVD (peripheral vascular disease) ([facility] [address]    GERD (gastroesophageal reflux disease)    History of bleeding ulcers    Metabolic acidosis    Hyperuricemia        PAST MEDICAL HISTORY:    Past Medical History:   Diagnosis Date    LETHA (acute kidney injury) ([facility] [address] 8/18/2020    Bleeding ulcer 2008    Blood circulation, collateral pain to legs    GERD (gastroesophageal reflux disease)     History of bleeding ulcers     Hypertension     Hyperuricemia 7/64/8136    Metabolic acidosis 4/36/0751    [facility]VD [facility]per[facility] ([facility])        DIET:    DIET RENAL;     PHYSICAL EXAM:     Patient Vitals for the past 24 hrs:   BP Temp Temp src Pulse Resp SpO2   08/24/20 1200 100/78 97.9 °F (36.6 °C) Infrared 97 16 93 %   08/24/20 1000 (!) 124/52 97.3 °F (36.3 °C) Infrared 91 18 94 %   08/24/20 0845 -- -- -- -- -- 93 %   08/24/20 0800 131/60 97.9 °F (36.6 °C) Infrared 93 16 97 %   08/24/20 0600 128/78 98.7 °F (37.1 °C) Infrared 90 18 98 %   08/24/20 0400 132/80 98.9 °F (37.2 °C) Infrared 85 18 98 %   08/24/20 0200 121/86 98.6 °F (37 °C) Infrared 82 18 96 %   08/24/20 0000 118/81 98.7 °F (37.1 °C) Temporal 86 18 98 %   08/23/20 2200 138/62 98.7 °F (37.1 °C) Temporal 82 18 98 %   08/23/20 2000 (!) 140/60 98.8 °F (37.1 °C) Temporal 80 18 98 %   08/23/20 1800 Grecia Clause ) 146/64 97.1 °F (36.2 °C) Temporal 84 14 96 %   08/23/20 1600 (!) 140/64 97.9 °F (36.6 °C) Infrared 84 18 93 %   08/23/20 1400 (!) 146/68 97.3 °F (36.3 °C) Temporal 88 14 96 %   @      Intake/Output Summary (Last 24 hours) at 8/24/2020 1208  Last data filed at 8/24/2020 0600  Gross per 24 hour   Intake 1246.25 ml   Output 1275 ml   Net -28.75 ml         Wt Readings from Last 3 Encounters:   08/22/20 172 lb (78 kg)   01/08/17 180 lb (81.6 kg)   10/14/14 175 lb (79.4 kg)       Constitutional:  Pt is in no acute distress  Head: normocephalic, atraumatic  Neck: no JVD  Cardiovascular: regular rate and rhythm, no rubs  Respiratory: Lungs clear upper, diminished lower lobes, no adventitious sounds  Gastrointestinal:  Soft, nontender, nondistended, + bowel sounds  Ext: No edema, soft wrist restraints b/l  Skin: dry, no rash on exposed skin  Neuro: disoriented    MEDS (scheduled):    potassium bicarb-citric acid  20 mEq Oral BID    [START ON 8/25/2020] risperiDONE  1 mg Oral Daily    risperiDONE  0.5 mg Oral Once    midodrine  5 mg Oral TID WC    metoprolol succinate  50 mg Oral Daily    allopurinol  100 mg Oral Daily    ferrous sulfate  325 mg Oral Daily with breakfast    heparin (porcine)  5,000 Units Subcutaneous Q8H    pantoprazole  40 mg Oral QAM AC       MEDS (infusions):   sodium chloride 75 mL/hr at 08/24/20 0558       MEDS (prn):  diphenhydrAMINE, HYDROcodone-acetaminophen, ondansetron, acetaminophen, mineral oil-hydrophilic petrolatum    DATA:    Recent Labs     08/22/20  0521 08/23/20  0758 08/24/20  0441   WBC 5.0 6.6 7.7   HGB 11.0* 11.0* 11.6   HCT 33.8* 34.6 35.8   .0* 107.5* 106.9*    282 267     Recent Labs     08/22/20  0521 08/23/20  0758 08/24/20  0441    147* 145   K 3.4* 3.5 3.3*   CL 98 106 106   CO2 28 25 23   BUN 88* 73* 63*   CREATININE 4.5* 3.9* 3.6*   LABGLOM 9 11 12   GLUCOSE 94 84 96   CALCIUM 8.3* 8.4* 8.7   ALT 14 16 18   AST 26 31 30   BILIDIR <0.2 0.2 <0.2

## 2020-08-24 NOTE — PROGRESS NOTES
Pt remains calm. Continues to pull at iv site, oxygen and removing monitor.  Soft restraints intact repositioned for comfort will continue to monitor

## 2020-08-24 NOTE — FLOWSHEET NOTE
Patient still continues to be confused and agitated. She attempts to pull off tele monitor and oxygen. Restraints repositioned and rom performed. Will continue to utilized bilateral soft restraints.

## 2020-08-24 NOTE — PROGRESS NOTES
Palliative Care Department  Palliative Medicine Progress Note  Provider: Genesis Shine DO      Ana Aly  [id number]    Hospital days prior to Consult: [facility]. Day: 7    Referring Provider:  Ori Orr DO  Attending Provider: Ney Jorgensen DO    Reason for Consult:  [x]  Code status Discussion  [x]  Discuss Medical Treatment Options as it Relates to Patient/Family Goals of Care  [x]  Psychosocial support    Chief Complaint: Abnormal creatinine and BUN    Subjective:     HPI:  Ana Aly is a 80 y.o. female with significant past medical history of hypertension, hyperuricemia, dementia, GERD, peripheral vascular disease, history of bleeding ulcer who presented to the emergency department from a nursing home with abnormal BUN and creatinine. Creatinine was 7 and BUN was 132.     Patient completed ED workup and was admitted to the hospital with the diagnoses of acute kidney injury and uremia. Nephrology was following.     Patient did have a CT of the abdomen and pelvis which revealed a distended bladder as well as a thickened distended rectum concerning for proctitis. Atelectasis/infiltrates and groundglass densities in the lung bases noted consistent with mild CHF or pneumonia. Patient's urine was positive and did grow ESBL. Patient did have a positive blood culture uncertain if contaminant. An echocardiogram was completed with no significant valvulopathy or reduced EF noted. Subjective events/Discussions:  8/20: No family at bedside. Reportedly patient's brother is in a dementia unit as well and per facility documentation he is the power of . Patient has a friend Sandeep Hinson diagnosed as a contact as well as a  who is identified as a financial contact. Facility contacted to request a copy of advance directives to determine if there is an alternate identified. Patient did have a small bowel movement. She is awake in bed and oriented to self.   Review of her soft chart indicates she is independent for eating however dependent or needing assist for all additional ADLs. She is reportedly oriented to self and can follow simple commands typically. She currently is in restraints as she has been combative and agitated during her admission here. 8/24: patient continues to be in restraints, hallucinating (states there is a hamburger on the floor, refers to empty chairs and states that \"they\" gave her the hamburger and placed it on the floor), becomes agitated easily, yells out. [facility] to [facility]. When asked what family she has, patient states \"Javier\" but unable to state who that is in relation to her. Spoke with patient's best friend Carmelita Koenig who doesn't feel patient would have wanted a PEG tube, but friend confirms that Chandu Nuñez is patient's POA. Attempted to call [employer], no response. Goals of care: continue current management and to be determined     Code Status: full code     Advanced Directives: Living Will, HC-POA and Documents requested     Surrogate/Legal NOK: Extended Family: and HCPOA- brother  Contacts:  Yazan Angeles-  brother   Mary Chun [phone], friend     Spiritual assessment: No spiritual distress identified   Bereavement and grief: to be determined    Review of Systems:  As per HPI, remaining review of systems negative/unremarkable  CONSTITUTIONAL:  fever, chill, rigors, nausea, vomiting, fatigue. HEENT: blurry vision, double vision, hearing problem, tinnitus, hoarseness, dysphagia, odynophagia  RESPIRATORY: cough, shortness of breath, sputum expectoration. CARDIOVASCULAR:  Chest pain/pressure, palpitation, syncope, irregular beats  GASTROINTESTINAL:  abdominal or rectal pain, diarrhea, constipation, . GENITOURINARY:  Burning, frequency, urgency, incontinence, discharge  INTEGUMENTARY: rash, wound, pruritis  HEMATOLOGIC/LYMPHATIC:  Swelling, sores, gum bleeding, easy bruising, pica.   MUSCULOSKELETAL:  pain, edema, joint swelling or redness  NEUROLOGICAL:  light headed, dizziness, loss of consciousness, weakness, change in memory, seizures, tremors    Objective:   PHYSICAL EXAM:     Vitals:    /78   Pulse 97   Temp 97.9 °F (36.6 °C) (Infrared)   Resp 16   Ht 5' 3\" (1.6 m)   Wt 172 lb (78 kg)   SpO2 93%   BMI 30.47 kg/m²   Gen: elderly, obese, NAD, awake, agitated at times  HEENT: normocephalic, atraumatic, PERRL, EOMI, MMM without ulcerations or lesions, sclera anicteric, conjunctiva pink and moist  Neck: trachea midline, no JVD  Lungs: respirations easy and not labored, bilaterally clear to auscultation, no wheezing rhonchi, rales  Heart: regular rate and rhythm, distant heart tones, no murmurs/rubs/gallops appreciated   Abdomen: normoactive bowel sounds, soft, non-tender, non-distended, no hepatospenomegaly, last documented BM 8/20  Extremities: no clubbing, cyanosis or edema, moving all extremities    Skin: warm, dry without rashes, lesions, bruising  Neuro: awake, alert, oriented x 1, hallucinating, unable to state the year (tells you what time she thinks it is) or what building she is in (just says \"I don't know\"), follows simple commands      Assessment/Plan        Palliative Care Encounter/Recommendations:    Medical record reviewed, spoke with patient's bedside nurse.         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 Urinary retention secondary to constipation     Constipation:  -Mineral oil enema followed by suppository  - started on senokot BID  -Patient needs more aggressive bowel regimen likely on discharge     Acute kidney injury:  -Nephrology following     Family Meeting:  Participants:- spoke with patient's best friend Lenka Sánchez who is patient's friend since 1st grade. Lenka Sánchez doesn't think patient would have wanted PEG tube. Lenka Sánchez states call [facility] and ask to be connected with [facility]'s room.       Controlled Substances Monitoring:       Discharge planning:   discharge to skilled facility     Referrals to: none today    The following was achieved as a result of this Palliative Care encounter  - Patient/family verbalizes improved understanding of chronic illness and its trajectory  - patient/family goals of care have been identified  - Family Meeting:  Participants:patient  Family meeting was not held:patient unable to contribute meaningfully, unable to reach brother spoke with friend Lenka Sánchez who doesn't think patient would want PEG tube. - Treatment options and plan of care have been discussed and revised  - advanced directives completed per notes but not in media  - code status currently full code, patient unable to contribute to meaningful conversation regarding code status  - Discussed patient and the plan of care with the Palliative medicine IDT members. Controlled Substances Monitoring:      Time/Communication  27 minutes were spent in total for this visit, which consisted primarily of counseling and education dealing with the complex and emotionally intense issues of symptom management and palliative care in the setting of serious and potentially life-threatening illness. Patient/family had opportunity to ask questions and all questions were answered.   Thank you for permitting Palliative Medicine to participate in the care of Deadra Aas. Tone Villegas DO  Palliative Medicine    1600: called Javy Garcia-  listed as alternate POA by [facility] paperwork. Left message.

## 2020-08-24 NOTE — PLAN OF CARE
Problem: Falls - Risk of:  Goal: Will remain free from falls  Description: Will remain free from falls  Outcome: Met This Shift  Goal: Absence of physical injury  Description: Absence of physical injury  Outcome: Met This Shift     Problem: Restraint Use - Nonviolent/Non-Self-Destructive Behavior:  Goal: Absence of restraint indications  Description: Absence of restraint indications  8/24/2020 0033 by Rosalva Sainz RN  Outcome: Not Met This Shift  8/23/2020 1834 by Forrest Murray RN  Outcome: Not Met This Shift  Goal: Absence of restraint-related injury  Description: Absence of restraint-related injury  8/24/2020 0033 by Rosalva Sainz RN  Outcome: Met This Shift  8/23/2020 1834 by Forrest Murray RN  Outcome: Met This Shift     Problem: Skin Integrity:  Goal: Will show no infection signs and symptoms  Description: Will show no infection signs and symptoms  Outcome: Met This Shift  Goal: Absence of new skin breakdown  Description: Absence of new skin breakdown  Outcome: Met This Shift     Problem: Confusion - Acute:  Goal: Absence of continued neurological deterioration signs and symptoms  Description: Absence of continued neurological deterioration signs and symptoms  Outcome: Met This Shift  Goal: Mental status will be restored to baseline  Description: Mental status will be restored to baseline  Outcome: Not Met This Shift     Problem: Injury - Risk of, Physical Injury:  Goal: Will remain free from falls  Description: Will remain free from falls  Outcome: Met This Shift  Goal: Absence of physical injury  Description: Absence of physical injury  Outcome: Met This Shift

## 2020-08-24 NOTE — PROGRESS NOTES
Dr. Jaida Lockett notified that the pt RN spoke in great detail with the pt alternate NICOLLE Moody who states that he wishes for the pt to remain a full code and proceed with a PEG tube at this time. Dr. Jaida Lockett states that the pt may down grade to a general floor at this time. Orders placed.

## 2020-08-24 NOTE — PROGRESS NOTES
Coordination of care discussion and chart review with PM team. LSW reviewed copy of HCPOA which appoints her brother Saray Echevarria as main agent., He is also a resident of [facility]. Alternate is [employer].  will remain available for on-going psychosocial support, as needed.

## 2020-08-24 NOTE — PROGRESS NOTES
Spoke with attorny keren dubonis poa about patient care. Stated he wants to continue with full code. Stated he wants to go ahead with peg tube if needed.

## 2020-08-25 NOTE — ANESTHESIA PRE PROCEDURE
Department of Anesthesiology  Preprocedure Note       Name:  Patricio Cid   Age:  80 y.o.  :  1932                                          MRN:  [id number]         Date:  2020      Surgeon: Blade No):  Jamila Lloyd MD    Procedure: Procedure(s):  EGD PEG TUBE INSERTION    Medications prior to admission:   Prior to Admission medications    Medication Sig Start Date End Date Taking? Authorizing Provider   spironolactone-hydroCHLOROthiazide (ALDACTAZIDE) 25-25 MG per tablet Take 1 tablet by mouth daily   Yes Historical Provider, MD   calcium carbonate 600 MG TABS tablet Take 2 tablets by mouth daily   Yes Historical Provider, MD   Cranberry 500 MG TABS Take 500 mg by mouth daily   Yes Historical Provider, MD   metoprolol succinate (TOPROL XL) 50 MG extended release tablet Take 50 mg by mouth daily   Yes Historical Provider, MD   HYDROcodone-acetaminophen (NORCO) 5-325 MG per tablet Take 1 tablet by mouth every 12 hours as needed for Pain. Yes Historical Provider, MD   potassium chloride (KLOR-CON M) 10 MEQ extended release tablet Take 10 mEq by mouth daily   Yes Historical Provider, MD   mirtazapine (REMERON) 15 MG tablet Take 15 mg by mouth nightly   Yes Historical Provider, MD   vitamin D (ERGOCALCIFEROL) 1.25 MG (45904 UT) CAPS capsule Take 50,000 Units by mouth every 14 days Given on the  and the    Yes Historical Provider, MD   omeprazole (PRILOSEC) 20 MG capsule Take 20 mg by mouth daily. Yes Historical Provider, MD   ferrous sulfate 325 (65 FE) MG tablet Take 325 mg by mouth 2 times daily    Yes Historical Provider, MD   acetaminophen (TYLENOL) 325 MG tablet Take 650 mg by mouth every 4 hours as needed for Pain or Fever Indications: Pain    Yes Historical Provider, MD   therapeutic multivitamin-minerals (THERAGRAN-M) tablet Take 1 tablet by mouth daily.      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 ([facility] F02.80    Closed displaced supracondylar fracture with intracondylar extension of lower end of right femur with malunion S72.461P    Osteoarthritis of right knee M17.11    LETHA (acute kidney injury) ([facility] N17.9    PVD (peripheral vascular disease) ([facility]) I73.9    GERD (gastroesophageal reflux disease) K21.9    History of bleeding ulcers L14.80    Metabolic acidosis L45.5    Hyperuricemia E79.0       Past Medical History:        Diagnosis Date    LETHA (acute kidney injury) ([facility] 8/18/2020    Bleeding ulcer 2008    Blood circulation, collateral pain to legs    GERD (gastroesophageal reflux disease)     History of bleeding ulcers     Hypertension     Hyperuricemia 6/25/0646    Metabolic acidosis 4/13/6316    PVD (peripheral vascular disease) ([facility]        Past Surgical History:        Procedure Laterality Date    ENDOSCOPY, COLON, DIAGNOSTIC  colonoscopy    FRACTURE SURGERY Right 12/7/2013    ORIF RIGHT FEMUR    HYSTERECTOMY  early 80's    TONSILLECTOMY  as a child    WRIST FRACTURE SURGERY Right as a child       Social History:    Social History     Tobacco Use    Smoking status: Never Smoker    Smokeless tobacco: Never Used   Substance Use Topics    Alcohol use:  No                                Counseling given: Not Answered      Vital Signs (Current):   Vitals:    08/25/20 0000 08/25/20 0200 08/25/20 0400 08/25/20 0600   BP: 123/62 (!) 133/56 (!) 104/53 (!) 131/59   Pulse: 95 95 98 95   Resp: 18 18 18 18   Temp: 96.9 °F (36.1 °C) 96.9 °F (36.1 °C) 97.1 °F (36.2 °C) 96.4 °F (35.8 °C)   TempSrc: Infrared Infrared Infrared Infrared   SpO2: 92% 96% 94% 96%   Weight:       Height:                                                  BP Readings from Last 3 Encounters:   08/25/20 (!) 131/59   01/08/17 114/74       NPO Status:                                                                                 BMI:   Wt Readings from Last 3 Encounters:   08/22/20 172 lb (78 kg)   01/08/17 180 lb (81.6 kg)   10/14/14 175 lb (79.4 kg)     Body mass index is 30.47 kg/m². CBC:   Lab Results   Component Value Date    WBC 7.7 08/24/2020    RBC 3.35 08/24/2020    HGB 11.6 08/24/2020    HCT 35.8 08/24/2020    .9 08/24/2020    RDW 13.6 08/24/2020     08/24/2020       CMP:   Lab Results   Component Value Date     08/24/2020    K 3.3 08/24/2020     08/24/2020    CO2 23 08/24/2020    BUN 63 08/24/2020    CREATININE 3.6 08/24/2020    GFRAA 14 08/24/2020    LABGLOM 12 08/24/2020    GLUCOSE 96 08/24/2020    GLUCOSE 100 05/21/2012    PROT 6.5 08/24/2020    CALCIUM 8.7 08/24/2020    BILITOT 0.5 08/24/2020    ALKPHOS 92 08/24/2020    AST 30 08/24/2020    ALT 18 08/24/2020       POC Tests: No results for input(s): POCGLU, POCNA, POCK, POCCL, POCBUN, POCHEMO, POCHCT in the last 72 hours.     Coags:   Lab Results   Component Value Date    PROTIME 11.3 12/06/2013    INR 1.0 12/06/2013    APTT 27.9 12/06/2013       HCG (If Applicable): No results found for: PREGTESTUR, PREGSERUM, HCG, HCGQUANT     ABGs: No results found for: PHART, PO2ART, DUS9QXF, NRP1EVN, BEART, Y1CGZBHL     Type & Screen (If Applicable):  No results found for: LABABO, LABRH    Drug/Infectious Status (If Applicable):  No results found for: HIV, HEPCAB    COVID-19 Screening (If Applicable):   Lab Results   Component Value Date    COVID19 Not Detected 08/20/2020         Anesthesia Evaluation  Patient summary reviewed and Nursing notes reviewed no history of anesthetic complications:   Airway: Mallampati: III        Dental:      Comment: Fair dentition    Pulmonary:Negative Pulmonary ROS breath sounds clear to auscultation                             Cardiovascular:    (+) hypertension:,         Rhythm: regular  Rate: normal                 ROS comment: EF 65%     Neuro/Psych:   (+) psychiatric history:             ROS comment: Dementia GI/Hepatic/Renal:   (+) GERD:, PUD, ROS comment: LETHA. Endo/Other: Negative Endo/Other ROS                    Abdominal:           Vascular:           ROS comment: PVD. Anesthesia Plan      MAC     ASA 3             Anesthetic plan and risks discussed with patient. Plan discussed with CRNA. Shahram Gutierrez DO   8/25/2020  Patient seen and examined, chart reviewed, agree with above findings. Anesthetic plan, risks, benefits, alternatives, and personnel involved discussed with patient. Patient verbalized an understanding and agreed to proceed. NPO status confirmed. Anesthetic plan discussed with care team members and agreed upon.     Shahram Gutierrez DO   8/25/2020  6:53 AM

## 2020-08-25 NOTE — PROGRESS NOTES
[facility]  Nephrology Progress Note    SUBJECTIVE:   8/21: Patient seen and examined. K and PO4 low today, being replaced. Renal function slowly improving  8/22: pt seen in room  8/23: pt agitated. 8/24: Resting in bed. Pleasant at this time. Denies sob, chest pain, abd pain. 8/25: Agitated, calling out for help but doesn't say what help is needed. Had PEG placed this morning.       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 [facility]    Closed displaced supracondylar fracture with intracondylar extension of lower end of right femur with malunion    Osteoarthritis of right knee    LETHA (acute kidney injury) ([facility]    PVD (peripheral vascular disease) ([facility]    GERD (gastroesophageal reflux disease)    History of bleeding ulcers    Metabolic acidosis    Hyperuricemia        PAST MEDICAL HISTORY:    Past Medical History:   Diagnosis Date    LETHA (acute kidney injury) ([facility] 8/18/2020    Bleeding ulcer 2008    Blood circulation, collateral pain to legs    GERD (gastroesophageal reflux disease)     History of bleeding ulcers     Hypertension     Hyperuricemia 0/69/9062    Metabolic acidosis 9/35/4876    PVD [facility]peripheral vascular disease) ([facility]        DIET:    Diet NPO Effective Now Exceptions are: Sips with Meds     PHYSICAL EXAM:     Patient Vitals for the past 24 hrs:   BP Temp Temp src Pulse Resp SpO2   08/25/20 0815 125/62 -- -- 93 18 --   08/25/20 0800 (!) 115/59 97.5 °F (36.4 °C) -- 100 20 95 %   08/25/20 0745 119/68 -- -- 87 22 94 %   08/25/20 0730 134/64 -- -- 87 20 93 %   08/25/20 0725 95/77 98.6 °F (37 °C) Temporal 89 20 93 %   08/25/20 0600 (!) 131/59 96.4 °F (35.8 °C) Infrared 95 18 96 %   08/25/20 0400 (!) 104/53 97.1 °F (36.2 °C) Infrared 98 18 94 %   08/25/20 0200 (!) 133/56 96.9 °F (36.1 °C) Infrared 95 18 96 %   08/25/20 0000 123/62 96.9 °F (36.1 °C) Infrared 95 18 92 %   08/24/20 2200 (!) 141/56 96.9 °F (36.1 °C) Infrared 94 18 96 %   08/24/20 2159 (!) 141/56 96.9 °F (36.1 °C) Infrared 94 18 --   08/24/20 2000 95/64 96.9 °F (36.1 °C) Infrared 91 18 96 %   08/24/20 1800 100/74 97.9 °F (36.6 °C) Infrared 99 18 95 %   08/24/20 1600 116/69 96.9 °F (36.1 °C) Infrared 91 16 94 %   08/24/20 1400 100/74 97.3 °F (36.3 °C) Infrared 95 16 95 %   08/24/20 1200 100/78 97.9 °F (36.6 °C) Infrared 97 16 93 %   @      Intake/Output Summary (Last 24 hours) at 8/25/2020 1159  Last data filed at 8/25/2020 0904  Gross per 24 hour   Intake 1526.7 ml   Output 1225 ml   Net 301.7 ml         Wt Readings from Last 3 Encounters:   08/22/20 172 lb (78 kg)   01/08/17 180 lb (81.6 kg)   10/14/14 175 lb (79.4 kg)       Constitutional:  Pt is in no acute distress  Head: normocephalic, atraumatic  Neck: no JVD  Cardiovascular: regular rate and rhythm, no rubs  Respiratory: Lungs clear upper, diminished lower lobes, no adventitious sounds  Gastrointestinal:  Soft, nontender, nondistended, + bowel sounds.  PEG tube in place  Ext: No edema, soft wrist restraints b/l  Skin: dry, no rash on exposed skin  Neuro: disoriented    MEDS (scheduled):    risperiDONE  1 mg Oral Daily    risperiDONE  0.5 mg Oral Once    sennosides-docusate sodium  2 tablet Oral BID    ceFAZolin  1 g Intravenous See Admin Instructions    midodrine  5 mg Oral TID WC    metoprolol succinate  50 mg Oral Daily    allopurinol  100 mg Oral Daily    ferrous sulfate  325 mg Oral Daily with breakfast    pantoprazole  40 mg Oral QAM AC       MEDS (infusions):   sodium chloride Stopped (08/25/20 0639)       MEDS (prn):  diphenhydrAMINE, HYDROcodone-acetaminophen, ondansetron, acetaminophen, mineral oil-hydrophilic petrolatum    DATA:    Recent Labs     08/23/20  0758 08/24/20  0441 08/25/20  1055   WBC 6.6 7.7 7.0   HGB 11.0* 11.6 11.1*   HCT 34.6 35.8 34.6   .5* 106.9* 107.5*    267 256     Recent Labs 08/23/20  0758 08/24/20  0441   * 145   K 3.5 3.3*    106   CO2 25 23   BUN 73* 63*   CREATININE 3.9* 3.6*   LABGLOM 11 12   GLUCOSE 84 96   CALCIUM 8.4* 8.7   ALT 16 18   AST 31 30   BILIDIR 0.2 <0.2   BILITOT 0.5 0.5   ALKPHOS 91 92   MG 1.7 1.5*   PHOS 3.5 2.9       Lab Results   Component Value Date    LABALBU 2.8 (L) 08/24/2020    LABALBU 2.7 (L) 08/23/2020    LABALBU 3.0 (L) 08/22/2020     Lab Results   Component Value Date    TSH 2.240 08/19/2020       Iron Studies  No results found for: IRON, TIBC, FERRITIN  Vitamin B-12   Date Value Ref Range Status   08/19/2020 >2000 (H) 211 - 946 pg/mL Final     Folate   Date Value Ref Range Status   08/19/2020 >20.0 4.8 - 24.2 ng/mL Final       Vit D, 25-Hydroxy   Date Value Ref Range Status   07/14/2020 30 30 - 100 ng/mL Final     Comment:     <20 ng/mL. ........... Felix Andrade Deficient  20-30 ng/mL. ......... Felix Andrade Insufficient   ng/mL. ........ Felix Andrade Sufficient  >100 ng/mL. .......... Felix Andrade Toxic       No results found for: PTH    No components found for: URIC    Lab Results   Component Value Date    COLORU Yellow 08/24/2020    NITRU POSITIVE 08/24/2020    GLUCOSEU Negative 08/24/2020    KETUA Negative 08/24/2020    UROBILINOGEN 0.2 08/24/2020    BILIRUBINUR Negative 08/24/2020       No results found for: Ericka Rogers      IMPRESSION/RECOMMENDATIONS:      1. Stage III Acute kidney injury  Likely pre-renal in the setting of poor po intake/ dehydration   Baseline Cr 0.7 in July 2020  Abdominal CT no hydro, atrophic kidneys  FeNa >1%  Continue IVF until PEG feedings/free water established  Follow labs daily  UO 1275 mL in last 24 hours  Cr 7.1->7.0->6.5->5.2>4.5>3.9>3.6>2. 9     2. Metabolic acidosis  In the setting of LETHA  Improved on bicarb drip  Bicarb drip now off  CO2 at goal >22     3. Hyperuricemia  8/19 uric acid 13.9  8/25 uric acid 7.2, improved with hydration     4. Hypomagnesemia with hypokalemia-  K and Mag replace prn  Follow    5.  Hypophosphatemia   In the setting of the decreased intake  Replace prn  8/25 PO4 2.9    6. Hypernatremia  On hypotonic fluids  8/25 Na+ 142    Cuca Arthur, APRN - CNS     Patient seen and examined all key components of the physical performed independently , case discussed with NP, all pertinent labs and radiologic tests personally reviewed agree with above.       Noemi Tamayo MD

## 2020-08-25 NOTE — PROGRESS NOTES
Chief Complaint:   AMS, renal failure    Subjective:  Post op PEG  The patient is sedated. Confused. Loud and agitated at times per staff. No new problems overnight. Denies chest pain & SOB . Denies abdominal pain. Tolerating diet. No nausea or vomiting. Objective:    Vitals:    20 0600   BP: (!) 131/59   Pulse: 95   Resp: 18   Temp: 96.4 °F (35.8 °C)   SpO2: 96%     Sedated post op , skin turgor improving, NAD  Heart:  RRR, no murmurs, gallops, or rubs. Lungs:  CTA bilaterally, no wheeze, rales or rhonchi  Abd: bowel sounds present, nontender, nondistended, no masses  Extrem:  No clubbing, cyanosis, or edema  Tele: NSR    Lab Results   Component Value Date     2020    K 3.3 2020     2020    CO2 23 2020    BUN 63 2020    CREATININE 3.6 2020    CALCIUM 8.7 2020      Lab Results   Component Value Date    WBC 7.7 2020    RBC 3.35 2020    HGB 11.6 2020    HCT 35.8 2020    .9 2020    MCH 34.6 2020    MCHC 32.4 2020    RDW 13.6 2020     2020    MPV 10.4 2020     PT/INR:    Lab Results   Component Value Date    PROTIME 11.3 2013    INR 1.0 2013     No results for input(s): POCGLU in the last 72 hours. Recent Labs     20  0758 20  0441   * 145   K 3.5 3.3*    106   CO2 25 23   BUN 73* 63*   LABALBU 2.7* 2.8*   CREATININE 3.9* 3.6*   CALCIUM 8.4* 8.7   GFRAA 13 14   LABGLOM 11 12   GLUCOSE 84 96       Ct Abdomen Pelvis Wo Contrast Additional Contrast? None    Result Date: 2020  Patient MRN:  [id number] : 1932 Age: 80 years Gender: Female Order Date:  2020 4:25 PM EXAM: CT ABDOMEN PELVIS WO CONTRAST number of images 379. Technique: Low-dose CT  acquisition technique included one of following options; 1 . Automated exposure control, 2. Adjustment of MA and or KV according to patient's size or 3. Use of iterative reconstruction.

## 2020-08-25 NOTE — CONSULTS
[address]                  [address] [facility],  [facility]                                  CONSULTATION    PATIENT NAME: Joel Flor                   :        1932  MED REC NO:   [id number]                            ROOM:       6412  ACCOUNT NO:   [de-identified]                           ADMIT DATE: 2020  PROVIDER:     Gian Davis MD    CONSULT DATE:  2020    CHIEF COMPLAINT:  Inability to take p.o. HISTORY OF PRESENT ILLNESS:  An 72-year-old female brought from [facility] to [address] [place] [address] with a BUN of 132 and  creatinine of 7.1. The patient had been eating and drinking  appropriately prior to admission, but during the course of admission is  now unable to take any p.o. liquid or solid foods. A consultation was  placed with General Surgery for PEG tube. The patient was able to  answer some questions but is a very poor historian and her legal  guardian is , Liban Crocker, from whom informed consent was  obtained. PAST MEDICAL HISTORY:  Significant for acute kidney injury, history of a  bleeding duodenal ulcer, peripheral vascular disease, hypertension,  gastroesophageal reflux disease. PAST SURGICAL HISTORY:  Includes colonoscopy in , fracture of the  femur in , hysterectomy, tonsillectomy and wrist fracture. MEDICATIONS PRIOR TO ADMISSIONS:  Reviewed including spirolactone,  calcium carbonate, metoprolol, Norco, potassium, Remeron, vitamin D,  Prilosec, Tylenol, and GlycoLax. ALLERGIES:  The patient has no allergies. REVIEW OF SYSTEMS:  The patient had chart reviewed for review of  systems. There is no evidence of nausea, vomiting, fevers, chills,  diarrhea. There is no evidence for shortness of breath or chest pain. There is no evidence of cough or sputum production. There is no  evidence of abdominal discomfort or pain. There is no evidence of  diarrhea.   There is no evidence of skin rashes, seizures, neuro changes,  GI or . PHYSICAL EXAMINATION:  VITAL SIGNS:  Stable, afebrile. BMI 32. GENERAL APPEARANCE:  Pleasantly confused, aware only of her name. SKIN:  Warm and dry. HEENT:  Head:  Normocephalic, atraumatic. Eyes:  Conjunctiva pink. Ears:  External ears normal.  NECK:  Supple. No JVD or lymphadenopathy. LUNGS:  Clear to auscultation bilaterally without rhonchi or crackles. COR:  Regular rate and rhythm. Normal S1, S2. No murmurs, rubs or  gallops. ABDOMEN:  Soft, nondistended, nontender. Bowel sounds present. No  masses, no organomegaly. No rebound, no guarding. EXTREMITIES:  No clubbing, cyanosis or edema. MUSCULOSKELETAL:  Motor strength appears weak. NEURO:  No focal deficits. Cranial nerves II-XII grossly intact. BREASTS:  Deferred. RECTAL:  Deferred. GENITALIA:  Deferred. LABORATORY DATA:  Reviewed. CT scan reviewed. No evidence of colon  anterior to the stomach. IMPRESSION:  Pharyngeal dysfunction, acute kidney injury, dementia. PLAN:  The patient is unable to take any p.o. at the present time. There has been witnessed cough when she has been attempted to be fed. Recommendation was made for a PEG. The risks, benefits, and options  have been reviewed with the power of  who agrees to proceed at  the present time. Informed consent obtained. Questions answered.         Kody Boogie MD    D: 08/25/2020 7:38:48       T: 08/25/2020 7:43:39     [email or web address]  Job#: [id number]     Doc#: [id number]    CC:

## 2020-08-25 NOTE — Clinical Note
Care coordination: Case discussed with Dr Rose Hoffman this am. He also spoke to [facility] who is POA.   Will proceed with peg, monitor overnight, check with renal for dc clearance, monitor TF and residual and if pt is stable

## 2020-08-25 NOTE — PROGRESS NOTES
chill, rigors, nausea, vomiting, fatigue. HEENT: blurry vision, double vision, hearing problem, tinnitus, hoarseness, dysphagia, odynophagia  RESPIRATORY: cough, shortness of breath, sputum expectoration. CARDIOVASCULAR:  Chest pain/pressure, palpitation, syncope, irregular beats  GASTROINTESTINAL:  abdominal or rectal pain, diarrhea, constipation, . GENITOURINARY:  Burning, frequency, urgency, incontinence, discharge  INTEGUMENTARY: rash, wound, pruritis  HEMATOLOGIC/LYMPHATIC:  Swelling, sores, gum bleeding, easy bruising, pica. MUSCULOSKELETAL:  pain, edema, joint swelling or redness  NEUROLOGICAL:  light headed, dizziness, loss of consciousness, weakness, change in memory, seizures, tremors    Objective:   PHYSICAL EXAM:     Vitals:    BP (!) 115/59   Pulse 100   Temp 97.5 °F (36.4 °C)   Resp 20   Ht 5' 3\" (1.6 m)   Wt 172 lb (78 kg)   SpO2 95%   BMI 30.47 kg/m²   Gen: elderly, obese, NAD, awake, agitated and restless  HEENT: normocephalic, atraumatic, PERRL, EOMI, MMM without ulcerations or lesions, sclera anicteric, conjunctiva pink and moist  Neck: trachea midline, no JVD  Lungs: respirations easy and not labored, bilaterally clear to auscultation, no wheezing rhonchi, rales  Heart: regular rate and rhythm, distant heart tones, no murmurs/rubs/gallops appreciated   Abdomen: normoactive bowel sounds, soft, non-tender, non-distended, no hepatospenomegaly, last documented BM 8/20, has PEG tube with binder  Extremities: no clubbing, cyanosis or edema, moving all extremities    Skin: warm, dry without rashes, lesions, bruising  Neuro: awake, alert, oriented x 1, difficult to redirect, repeatedly asking for help      Assessment/Plan        Palliative Care Encounter/Recommendations:    Medical record reviewed, spoke with patient's bedside nurse.         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 Urinary retention secondary to constipation     Constipation:  -Mineral oil enema followed by suppository  - started on senokot BID  -Patient needs more aggressive bowel regimen likely on discharge     Acute kidney injury:  -Nephrology following     Family Meeting:  Participants:-spole with NICOLLE Troncoso who requests patient be made DNR-CCA.     Controlled Substances Monitoring:       Discharge planning:   discharge to skilled facility     Referrals to: none today    The following was achieved as a result of this Palliative Care encounter  - POA verbalizes improved understanding of chronic illness and its trajectory  - patient/family goals of care have been identified  - Family Meeting:  [facility]  Family meeting was not held:diagnosis, prognosis, treatment options, goals of care, prior expressed wishes, advanced care planning, symptom management and discharge plan spoke with NICOLLE Troncoso- , who believes patient would not benefit from CPR and wishes patient to be DNR-CCA.   - Treatment options and plan of care have been discussed and revised  - advanced directives completed per notes but not in media  - code status changed [facility]  - Discussed patient and the plan of care with the Palliative medicine IDT members. No further PM needs identified. Will sign off. Please re-consult if further needs arise. Time/Communication  26 minutes were spent in total for this visit, which consisted primarily of counseling and education dealing with the complex and emotionally intense issues of symptom management and palliative care in the setting of serious and potentially life-threatening illness. Patient/family had opportunity to ask questions and all questions were answered. Thank you for permitting Palliative Medicine to participate in the care of Sami Sawyer.     [address]

## 2020-08-25 NOTE — OP NOTE
duodenum was performed without complications. Multiple photographs  were taken. The scope was then brought back into the stomach. On the anterior wall,  the light was visualized. The area was prepped and draped in a sterile  fashion. A 5-mL wheal of lidocaine was created over the area. A small  nick was made with a 15 blade. A needle was inserted directly into the  stomach without complication. Using a loop through the scope, the  needle was grasped. A guidewire was passed through the needle and  subsequently grasped with the loop snare. The snare was then brought  out through the oropharynx. In the usual fashion the PEG tube was  secured to the guidewire and in pull-through fashion was brought through  the oropharynx, esophagus, stomach, and out the anterior abdominal wall. The PEG tube was in proper position and matured. The scope was  reinserted without complication, and visualization demonstrated no  evidence of bleeding. The patient tolerated the procedure well and was transferred to the  recovery room area in stable condition, with stable vital signs.         Cl Gillespie MD    D: 08/25/2020 7:33:08       T: 08/25/2020 8:04:35     [email or web address]  Job#: [id number]     Doc#: [id number]    CC:  DO Dane Peter DO Tomma Lakes, MD

## 2020-08-25 NOTE — BRIEF OP NOTE
DATE OF PROCEDURE: 8/25/2020    SURGEON: Dr. Yan Chaidezgh: none    PREOPERATIVE DIAGNOSES:  Inability to take PO, pharyngeal dysfunction    POSTOPERATIVE DIAGNOSES: same,     OPERATION: PEG/EGD   ANESTHESIA: LMAC    COMPLICATIONS: None. EBL: none    FINDINGS:  Duodenitis noted and biopsies; No ulcerations. 20 Fr PEG placed 4 cm from skin surface. Multiple photos taken. Please see dictation.          Yan Rush 8/25/2020 6:57 AM

## 2020-08-26 NOTE — PROGRESS NOTES
18 96 % --   08/25/20 2200 (!) 104/52 98.3 °F (36.8 °C) Oral 102 18 95 % --   08/25/20 2000 110/69 97.6 °F (36.4 °C) Oral 100 18 -- --   08/25/20 1800 132/64 97.5 °F (36.4 °C) Oral 100 18 93 % --   08/25/20 1600 129/60 97.1 °F (36.2 °C) Oral 102 16 92 % --   08/25/20 1400 116/60 97.8 °F (36.6 °C) Oral 89 18 95 % --   08/25/20 1200 (!) 114/56 97.4 °F (36.3 °C) Oral 93 18 94 % --   @      Intake/Output Summary (Last 24 hours) at 8/26/2020 1158  Last data filed at 8/26/2020 0609  Gross per 24 hour   Intake 1241 ml   Output 1375 ml   Net -134 ml         Wt Readings from Last 3 Encounters:   08/22/20 172 lb (78 kg)   01/08/17 180 lb (81.6 kg)   10/14/14 175 lb (79.4 kg)       Constitutional:  Pt is in no acute distress  Head: normocephalic, atraumatic  Neck: no JVD  Cardiovascular: regular rate and rhythm, no rubs  Respiratory: Lungs clear upper, diminished lower lobes, no adventitious sounds  Gastrointestinal:  Soft, nontender, nondistended, + bowel sounds.  PEG tube in place  Ext: No edema, soft wrist restraints b/l  Skin: dry, no rash on exposed skin  Neuro: disoriented    MEDS (scheduled):    magnesium sulfate  4 g Intravenous Once    risperiDONE  1 mg Oral Daily    risperiDONE  0.5 mg Oral Once    sennosides-docusate sodium  2 tablet Oral BID    ceFAZolin  1 g Intravenous See Admin Instructions    midodrine  5 mg Oral TID WC    metoprolol succinate  50 mg Oral Daily    allopurinol  100 mg Oral Daily    ferrous sulfate  325 mg Oral Daily with breakfast    pantoprazole  40 mg Oral QAM AC       MEDS (infusions):   sodium chloride 75 mL/hr at 08/26/20 0413       MEDS (prn):  diphenhydrAMINE, HYDROcodone-acetaminophen, ondansetron, acetaminophen, mineral oil-hydrophilic petrolatum    DATA:    Recent Labs     08/24/20  0441 08/25/20  1055 08/26/20  0449   WBC 7.7 7.0 9.3   HGB 11.6 11.1* 11.1*   HCT 35.8 34.6 34.8   .9* 107.5* 105.5*    256 252     Recent Labs     08/24/20  0441 08/25/20  1055 08/26/20  0449    142 142   K 3.3* 3.2* 3.8    102 103   CO2 23 23 21*   BUN 63* 51* 45*   CREATININE 3.6* 2.9* 2.7*   LABGLOM 12 15 17   GLUCOSE 96 100* 101*   CALCIUM 8.7 8.5* 8.2*   ALT 18 19 12   AST 30 27 22   BILIDIR <0.2 <0.2 <0.2   BILITOT 0.5 0.5 0.5   ALKPHOS 92 98 102   MG 1.5* 1.2* 1.0*   PHOS 2.9 2.9 5.9*       Lab Results   Component Value Date    LABALBU 2.9 (L) 08/26/2020    LABALBU 3.0 (L) 08/25/2020    LABALBU 2.8 (L) 08/24/2020     Lab Results   Component Value Date    TSH 2.240 08/19/2020       Iron Studies  No results found for: IRON, TIBC, FERRITIN  Vitamin B-12   Date Value Ref Range Status   08/19/2020 >2000 (H) 211 - 946 pg/mL Final     Folate   Date Value Ref Range Status   08/19/2020 >20.0 4.8 - 24.2 ng/mL Final       Vit D, 25-Hydroxy   Date Value Ref Range Status   07/14/2020 30 30 - 100 ng/mL Final     Comment:     <20 ng/mL. ........... Marleny Thu Deficient  20-30 ng/mL. ......... Marleny Thu Insufficient   ng/mL. ........ Marleny Thu Sufficient  >100 ng/mL. .......... Marleny Thu Toxic       No results found for: PTH    No components found for: URIC    Lab Results   Component Value Date    COLORU Yellow 08/24/2020    NITRU POSITIVE 08/24/2020    GLUCOSEU Negative 08/24/2020    KETUA Negative 08/24/2020    UROBILINOGEN 0.2 08/24/2020    BILIRUBINUR Negative 08/24/2020       No results found for: Laly Lang      IMPRESSION/RECOMMENDATIONS:      1. Stage III Acute kidney injury  Likely pre-renal in the setting of poor po intake/ dehydration   Baseline Cr 0.7 in July 2020  Abdominal CT no hydro, atrophic kidneys  FeNa >1%  Continue IVF until PEG feedings/free water established  Follow labs daily  UO 1275 mL in last 24 hours  Cr 7.1->7.0->6.5->5.2>4.5>3.9>3.6>2.9>2. 7       2. Metabolic acidosis  In the setting of LETHA  Improved on bicarb drip  Bicarb drip now off  CO2 21 today,  goal >22     3. Hyperuricemia  8/19 uric acid 13.9  8/25 uric acid 7.2, improved with hydration     4.  Hypomagnesemia with hypokalemia-  K and Mag replace prn  8/26 Mg 1.0 - supplement with Mg sulfate 4 gms     5. Hypophosphatemia   In the setting of the decreased intake  Replace prn  8/25 PO4 2.9 - received IV replacement    6. Hypernatremia  On hypotonic fluids  8/26 Na+ 142    Rosalva Romberg, TIKA - CNS     Patient seen and examined all key components of the physical performed independently , case discussed with NP, all pertinent labs and radiologic tests personally reviewed agree with above.     -Severe hypomagnesemia, exacerbating hypokalemia; supplement  Jeff Ba MD

## 2020-08-26 NOTE — PROGRESS NOTES
Pt is is still confused but, is calm. Bilateral wrist restraints removed.   placed in room to monitor pt pulling at lines

## 2020-08-26 NOTE — PROGRESS NOTES
Pt continues to remain in restraints. She is still pulling at IV lines and newly inserted PEG.   Rekha Liao

## 2020-08-26 NOTE — PROGRESS NOTES
NT    Dynamic:NT     Endurance/Activity Tolerance F-; pt emotionally labile limited by cognition  Poor   F+    during 15-20 min ADL task    Visual/  Perceptual Glasses: grossly WFL acuity           UE strengthening     See UE Assessment Below   G tolerance  For BUE AROM/AAROM in all planes to improve overall function for ADL tasks        Comments: Upon arrival pt supine in bed. Pt educated on techniques to increase independence and safety during ADL's and bed mobility. At end of session pt left seated upright in bed, call light within reach. · Pt has made limited progress towards set goals.      · Continue with current plan of care    Treatment Time In: 3:55            Treatment Time Out: 4:05             Treatment Charges: Mins Units   Ther Ex  50163     Manual Therapy 03176     Thera Activities 18282 10 1   ADL/Home Mgt 97180     Neuro Re-ed 47471     Group Therapy      Orthotic manage/training  02375     Non-Billable Time     Total Timed Treatment 10 [address], [address]

## 2020-08-26 NOTE — PLAN OF CARE
Problem: Falls - Risk of:  Goal: Will remain free from falls  Description: Will remain free from falls  8/26/2020 0453 by Bindu Collins RN  Outcome: Met This Shift  8/25/2020 1923 by Nimesh Ashley RN  Outcome: Met This Shift  Goal: Absence of physical injury  Description: Absence of physical injury  8/26/2020 0453 by Bindu Collins RN  Outcome: Met This Shift  8/25/2020 1923 by Nimesh Ashley RN  Outcome: Met This Shift     Problem: Restraint Use - Nonviolent/Non-Self-Destructive Behavior:  Goal: Absence of restraint indications  Description: Absence of restraint indications  8/26/2020 0453 by Bindu Collins RN  Outcome: Met This Shift  8/25/2020 1923 by Nimesh Ashley RN  Outcome: Not Met This Shift  Goal: Absence of restraint-related injury  Description: Absence of restraint-related injury  8/26/2020 0453 by Bindu Collins RN  Outcome: Met This Shift  8/25/2020 1923 by Nimesh Ashley RN  Outcome: Met This Shift     Problem: Skin Integrity:  Goal: Will show no infection signs and symptoms  Description: Will show no infection signs and symptoms  8/26/2020 0453 by Bindu Collins RN  Outcome: Met This Shift  8/25/2020 1923 by Nimesh Ashley RN  Outcome: Met This Shift  Goal: Absence of new skin breakdown  Description: Absence of new skin breakdown  8/26/2020 0453 by Bindu Collins RN  Outcome: Met This Shift  8/25/2020 1923 by Nimesh Ashley RN  Outcome: Met This Shift     Problem: Injury - Risk of, Physical Injury:  Goal: Will remain free from falls  Description: Will remain free from falls  8/26/2020 0453 by Bindu Collins RN  Outcome: Met This Shift  8/25/2020 1923 by Nimesh Ashley RN  Outcome: Met This Shift  Goal: Absence of physical injury  Description: Absence of physical injury  8/26/2020 0453 by Bindu Collins RN  Outcome: Met This Shift  8/25/2020 1923 by Nimesh Ashley RN  Outcome: Met This Shift     Problem: Mood - Altered:  Goal: Mood

## 2020-08-26 NOTE — PROGRESS NOTES
Comprehensive Nutrition Assessment    Type and Reason for Visit:  Initial(LOS)    Nutrition Recommendations/Plan: Tube Feeding recommendation if needed. Recommend Standard with Fiber (Jevity 1.2) @50/hr (increase slowly) to provide 1200ml, 1440 calories, 67g protein, 968ml water. Monitor renal lab values as available. Pt adm w/ LETHA. Nutrition Assessment:  Patient at nutritional risk d/t poor po intake x 1 week since admission ; pt from nursing home; po intake was apparently adequate PTA but now unable to take po solids/liquids ; questionable dysphagia ; s/p EGD/PEG 8/25 ; hx of dementia ; pt pulling at new PEG ; will provide TF recommendations    Malnutrition Assessment:  Malnutrition Status: At risk for malnutrition (Comment)    Context:  Acute Illness     Findings of the 6 clinical characteristics of malnutrition:  Energy Intake:  1 - 75% or less of estimated energy requirements for 7 or more days  Weight Loss:  Unable to assess(d/t lack of weight history)     Body Fat Loss:  Unable to assess(data not available to assess at this time)     Muscle Mass Loss:  Unable to assess(data not available to assess at this time)    Fluid Accumulation:  No significant fluid accumulation     Strength:  Not Performed    Estimated Daily Nutrient Needs:  Energy (kcal):  8947-9713 (REE 1186 x 1.2 SF); Weight Used for Energy Requirements:  Current     Protein (g):  65-80 (1.3-1.5g/kg IBW);  Weight Used for Protein Requirements:  Ideal        Fluid (ml/day):  8180-5806; Weight Used for Fluid Requirements:  [place]      Nutrition Related Findings:  -I&Os (-2.4 L), 2+ edema, active BS, rounded abd, constipation, A&O x 1, redness to buttocks, excoriation to breast, PEG      Wounds:  None       Current Nutrition Therapies:    Diet NPO Effective Now Exceptions are: Sips with Meds    Anthropometric Measures:  · Height: 5' 3\" (160 cm)  · Current Body Weight: 172 lb (78 kg)(8/22/20, no method)   · Admission Body Weight: 174 lb (78.9 kg)(8/19/20, first bedscale)    · Usual Body Weight: (UTO ; no weights available in EMR history from previous encounters)     · Ideal Body Weight: 115 lbs; % Ideal Body Weight 149.6 %   · BMI: 30.5  · BMI Categories: Obese Class 1 (BMI 30.0-34. 9)       Nutrition Diagnosis:   · Inadequate oral intake related to cognitive or neurological impairment as evidenced by NPO or clear liquid status due to medical condition, nutrition support - enteral nutrition      Nutrition Interventions:   Food and/or Nutrient Delivery:  Continue NPO, Start Tube Feeding  Nutrition Education/Counseling:  Education not indicated   Coordination of Nutrition Care:  Continued Inpatient Monitoring    Goals:  Tube Feeding will meet nutritional needs with good tolerance       Nutrition Monitoring and Evaluation:   Behavioral-Environmental Outcomes:  Knowledge or Skill   Food/Nutrient Intake Outcomes:  Enteral Nutrition Intake/Tolerance  Physical Signs/Symptoms Outcomes:  Biochemical Data, Constipation, GI Status, Fluid Status or Edema, Hemodynamic Status, Nutrition Focused Physical Findings, Skin, Weight     Discharge Planning:    Enteral Nutrition     Electronically signed by Joseph Marion RD, LD on 8/26/20 at 10:23 AM EDT    Contact: [phone]

## 2020-08-26 NOTE — CARE COORDINATION
Care Coordination: Plan to remove restraints, tele sitter and move to room 5, closer to nurses station. S/p peg with NGT. Will need to be out of restraints, Tested covid negative on 8/20.   Checking with facility to see if pt needs precert and retesting    Sirisha Nunez    Addendum:  No need for repeat and private pay, can return when out of restraints    Sirisha Nunez

## 2020-08-26 NOTE — ANESTHESIA POSTPROCEDURE EVALUATION
Department of Anesthesiology  Postprocedure Note    Patient: Karri Lu  MRN: [id number]  YOB: 1932  Date of evaluation: 8/26/2020  Time:  8:28 AM     Procedure Summary     Date:  08/25/20 Room / Location:  [address] [place] [address] / [facility]    Anesthesia Start:  0700 Anesthesia Stop:  0122    Procedures:       EGD PEG TUBE INSERTION (N/A )      EGD BIOPSY Diagnosis:  (.)    Surgeon:  Iman Sheehan MD Responsible Provider:  Carlos Burns DO    Anesthesia Type:  MAC ASA Status:  3          Anesthesia Type: MAC    Tim Phase I: Tim Score: 10    Tim Phase II:      Last vitals: Reviewed and per EMR flowsheets.        Anesthesia Post Evaluation    Patient location during evaluation: PACU  Patient participation: complete - patient participated  Level of consciousness: awake and alert  Pain score: 1  Airway patency: patent  Nausea & Vomiting: no nausea and no vomiting  Complications: no  Cardiovascular status: hemodynamically stable  Respiratory status: acceptable  Hydration status: euvolemic

## 2020-08-26 NOTE — PROGRESS NOTES
Pt resting comfortably in bed  Binder/PEG in place  VSS afebrile  Benign soft abdomen  BS+  Labs: hg 11.1            WBC 9.3            BUN 45            Cr 2.7  PEG ok for use  Surgery sign off

## 2020-08-26 NOTE — PROGRESS NOTES
Chief Complaint:   AMS, renal failure    Subjective:  Post op PEG    The patient is sedated. Confused. Loud and agitated at times per staff. No new problems overnight. Denies chest pain & SOB . Denies abdominal pain. Tolerating diet. No nausea or vomiting.      8/26/2020-patient laying quietly in bed no complaints of chest pain dyspnea. Nurse finishing assessment; discussion at length regarding treatments. Patient has no PEG tube feedings ordered as yet. Afebrile last 24 hours. 95% saturation on room air. Blood pressure had been low earlier today 99/49 currently 135/63. Pulse rate 100. Intake and output -2436 cc. BUN and creatinine slowly improving, 45/2.7. Magnesium low at 1.0 calcium low 8.2; phosphorus elevated 5.9. Sed rate 91, CRP 5.0. Hemoglobin 11.1 WBC 9.3. Patient underwent EGD yesterday with PEG insertion. Noted to have duodenitis no ulcerations. Palliative care progress note from yesterday appreciated; POA agrees to to continue current level of care does not want CPR. Nephrology note from yesterday appreciated; patient was agitated. Bicarb drip is now off. Surgical note from today appreciated, okay to use PEG tube surgery signing off. Patient confused yesterday requiring bilateral restraints applied and then removed with VIANNEY placed in room this morning. Objective:    Vitals:    08/26/20 0800   BP: (!) 99/49   Pulse: 84   Resp: 18   Temp: 98 °F (36.7 °C)   SpO2: 94%     General: Awake alert pleasantly confused no distress  Heart:  RRR, no murmurs, gallops, or rubs.   Lungs:  CTA bilaterally, no wheeze, rales or rhonchi  Abd: bowel sounds present, nontender, nondistended, no masses; PEG tube left upper quadrant  Extrem:  No clubbing, cyanosis, or edema  Tele: NSR    Lab Results   Component Value Date     08/26/2020    K 3.8 08/26/2020     08/26/2020    CO2 21 08/26/2020    BUN 45 08/26/2020    CREATININE 2.7 08/26/2020    CALCIUM 8.2 08/26/2020      Lab Results   Component Value Date    WBC 9.3 2020    RBC 3.30 2020    HGB 11.1 2020    HCT 34.8 2020    .5 2020    MCH 33.6 2020    MCHC 31.9 2020    RDW 13.5 2020     2020    MPV 10.3 2020     PT/INR:    Lab Results   Component Value Date    PROTIME 11.3 2013    INR 1.0 2013     No results for input(s): POCGLU in the last 72 hours. Recent Labs     20  0441 20  1055 20  0449    142 142   K 3.3* 3.2* 3.8    102 103   CO2 23 23 21*   BUN 63* 51* 45*   LABALBU 2.8* 3.0* 2.9*   CREATININE 3.6* 2.9* 2.7*   CALCIUM 8.7 8.5* 8.2*   GFRAA 14 19 20   LABGLOM 12 15 17   GLUCOSE 96 100* 101*       Ct Abdomen Pelvis Wo Contrast Additional Contrast? None    Result Date: 2020  Patient MRN:  [id number] : 1932 Age: 80 years Gender: Female Order Date:  2020 4:25 PM EXAM: CT ABDOMEN PELVIS WO CONTRAST number of images 379. Technique: Low-dose CT  acquisition technique included one of following options; 1 . Automated exposure control, 2. Adjustment of MA and or KV according to patient's size or 3. Use of iterative reconstruction. INDICATION:  renal failure, r/o obstruction, r/o hydro renal failure, r/o obstruction, r/o hydro COMPARISON: 2013 FINDINGS: The lung bases demonstrate minimal atelectasis/groundglass opacities. There is coronary artery calcification. Liver is normal. Gallbladder is distended. Spleen, pancreas, and adrenals are normal. The kidneys are somewhat atrophic with the large cystic lesions bilaterally, the largest one in the right kidney measuring 3.5 cm. Degenerative changes are identified in the lumbar spine with a nearly 40% compression deformity of the inferior endplate of S81 without significant change. Pelvis. Bladder is partially distended. There is constipation with a distended thickened rectum. The appendix is not identified.      Constipation with thickened distended rectum concerning for proctitis. There is no hydronephrosis or obstructing uropathy. Kidneys are somewhat atrophic. Atelectasis/infiltrates and groundglass densities in the lung bases which may be due to mild CHF and or pneumonia. Ct Head Wo Contrast    Result Date: 2020  Patient MRN:  [id number] : 1932 Age: 80 years Gender: Female Order Date:  2020 4:25 PM EXAM: CT HEAD WO CONTRAST INDICATION:  AMS AMS  COMPARISON: CT head without contrast, 2017 FINDINGS: PARENCHYMA:No mass effect, edema or hemorrhage. Moderate volume loss is seen in the cerebrum with mild to moderate chronic microvascular ischemic changes. VENTRICLES:No hydrocephalus. CALVARIUM:The calvarium is intact without fracture or focal lesion. SINUSES:The visualized paranasal sinuses and mastoids are clear. No acute intracranial abnormality. Scheduled Meds:   magnesium sulfate  4 g Intravenous Once    risperiDONE  1 mg Oral Daily    risperiDONE  0.5 mg Oral Once    sennosides-docusate sodium  2 tablet Oral BID    ceFAZolin  1 g Intravenous See Admin Instructions    midodrine  5 mg Oral TID WC    metoprolol succinate  50 mg Oral Daily    allopurinol  100 mg Oral Daily    ferrous sulfate  325 mg Oral Daily with breakfast    pantoprazole  40 mg Oral QAM AC     Continuous Infusions:   sodium chloride 75 mL/hr at 20 0413     PRN Meds:.diphenhydrAMINE, HYDROcodone-acetaminophen, ondansetron, acetaminophen, mineral oil-hydrophilic petrolatum    I/O last 3 completed shifts: In: 5212 [I.V.:891; IV Piggyback:500]  Out: 2569 [Urine:1375]  No intake/output data recorded.     Intake/Output Summary (Last 24 hours) at 2020 0954  Last data filed at 2020 0051  Gross per 24 hour   Intake 1241 ml   Output 1375 ml   Net -134 ml       Assessment:    Active Hospital Problems    Diagnosis    Hypertension [I10]     Priority: Medium    Osteoarthritis [M19.90]     Priority: Medium    Metabolic acidosis [P74.6]    Hyperuricemia [E79.0]    GERD (gastroesophageal reflux disease) [K21.9]    History of bleeding ulcers [Z87.11]    LETHA (acute kidney injury) ([facility] [N17.9]    PVD (peripheral vascular disease) ([facility] [I73.9]    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 ([facility] [F02.80]       Plan:  Continue IVF             Remains confused and agitated             Oral intake remains poor             RFT improving slowly, AG nl             resumed IVF             Renal f/u reviewed              Condition remains serious             Palliative care consult reviewed             Correct low K+               PEG today             Trial Risperdal for agitated behavior and appetite stimulation--increase dose              Geriatric consult pending    Greater than 35-minute visit      8/26/2020  Replace IV magnesium sulfate 4 g today    Begin PEG tube feedings today as recommended by dietary  Monitor labs  Cefazolin 1 g IV every 24 hours  Discontinue Toprol and oral Protonix  Protonix IV 40 mg daily  Replace intravenous calcium gluconate  Lopressor 25 mg PEG tube twice daily  Change Senokot Risperdal ferrous sulfate ProAmatine to PEG tube instead of oral       Hydrocodone 5 mg every 6 hours as needed PEG tube           [address]  9:54 AM  8/26/2020

## 2020-08-27 NOTE — PROGRESS NOTES
[address] [facility] [address] Syed Levels regarding patient's tube feeding. Residual is 30 ml.   Tube feeding is still running at 25 ml/hr

## 2020-08-27 NOTE — PROGRESS NOTES
Chief Complaint:   AMS, renal failure    Subjective:  Post op PEG  The patient is alert and remains confused. less agitated at times per staff. No new problems overnight. Denies chest pain & SOB . Denies abdominal pain. Tolerating diet. No nausea or vomiting. Tube feedings advanced. Objective:    Vitals:    20 2303   BP: (!) 106/52   Pulse: 74   Resp: 18   Temp: 96.8 °F (36 °C)   SpO2: 94%     Awake and confused , skin turgor near nl, NAD  Heart:  RRR, no murmurs, gallops, or rubs. Lungs:  CTA bilaterally, no wheeze, rales or rhonchi  Abd: bowel sounds present, nontender, nondistended, no masses  Extrem:  No clubbing, cyanosis, or edema       Lab Results   Component Value Date     2020    K 3.8 2020     2020    CO2 21 2020    BUN 45 2020    CREATININE 2.7 2020    CALCIUM 8.2 2020      Lab Results   Component Value Date    WBC 9.3 2020    RBC 3.30 2020    HGB 11.1 2020    HCT 34.8 2020    .5 2020    MCH 33.6 2020    MCHC 31.9 2020    RDW 13.5 2020     2020    MPV 10.3 2020     PT/INR:    Lab Results   Component Value Date    PROTIME 11.3 2013    INR 1.0 2013     No results for input(s): POCGLU in the last 72 hours. Recent Labs     20  1055 20  0449    142   K 3.2* 3.8    103   CO2 23 21*   BUN 51* 45*   LABALBU 3.0* 2.9*   CREATININE 2.9* 2.7*   CALCIUM 8.5* 8.2*   GFRAA 19 20   LABGLOM 15 17   GLUCOSE 100* 101*       Ct Abdomen Pelvis Wo Contrast Additional Contrast? None    Result Date: 2020  Patient MRN:  [id number] : 1932 Age: 80 years Gender: Female Order Date:  2020 4:25 PM EXAM: CT ABDOMEN PELVIS WO CONTRAST number of images 379. Technique: Low-dose CT  acquisition technique included one of following options; 1 . Automated exposure control, 2. Adjustment of MA and or KV according to patient's size or 3.  Use of  sodium chloride (PF)  10 mL Intravenous Daily    allopurinol  100 mg PEG Tube Daily    magnesium oxide  400 mg Oral TID    risperiDONE  0.5 mg Oral Once    ceFAZolin  1 g Intravenous See Admin Instructions     Continuous Infusions:   sodium chloride 75 mL/hr at 08/27/20 0322     PRN Meds:. HYDROcodone-acetaminophen, acetaminophen, diphenhydrAMINE, ondansetron, mineral oil-hydrophilic petrolatum    I/O last 3 completed shifts: In: 240 [P.O.:50; NG/GT:190]  Out: 700 [Urine:700]  No intake/output data recorded.     Intake/Output Summary (Last 24 hours) at 8/27/2020 3415  Last data filed at 8/26/2020 2306  Gross per 24 hour   Intake 240 ml   Output 700 ml   Net -460 ml       Assessment:    Active Hospital Problems    Diagnosis    Hypertension [I10]     Priority: Medium    Osteoarthritis [M19.90]     Priority: Medium    Metabolic acidosis [S15.4]    Hyperuricemia [E79.0]    GERD (gastroesophageal reflux disease) [K21.9]    History of bleeding ulcers [Z87.11]    [facility]HA [facility]acute [facility]) ([facility] [N17.9]    PVD (peripheral vascular disease) (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 [F02.80]       Plan:  Continue IVF             Less confused and agitated             Oral intake remains poor             RFT improving slowly, AG nl             IVF per renal              Renal f/u reviewed              Condition improving             Palliative care consult reviewed             Correct low K+               PEG yesterday              Continue Risperdal for agitated behavior and appetite stimulation--increase dose              Geriatric consult pending                            Shawna Hancock DO  7:11 AM  8/27/2020

## 2020-08-27 NOTE — PROGRESS NOTES
[facility]  Nephrology Progress Note    SUBJECTIVE:   8/21: Patient seen and examined. K and PO4 low today, being replaced. Renal function slowly improving  8/22: pt seen in room  8/23: pt agitated. 8/24: Resting in bed. Pleasant at this time. Denies sob, chest pain, abd pain. 8/25: Agitated, calling out for help but doesn't say what help is needed. Had PEG placed this morning.  8/26: Pleasantly confused. Cooperative this morning. States she feels good. 8/27/2020- awake and alert. She is pleasant and cooperative. Pleasantly confused.        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 ([facility]    Closed displaced supracondylar fracture with intracondylar extension of lower end of right femur with malunion    Osteoarthritis of right knee    LETHA (acute kidney injury) ([facility]    PVD (peripheral vascular disease) ([facility]    GERD (gastroesophageal reflux disease)    History of bleeding ulcers    Metabolic acidosis    Hyperuricemia        PAST MEDICAL HISTORY:    Past Medical History:   Diagnosis Date    LETHA (acute kidney injury) ([facility] 8/18/2020    Bleeding ulcer 2008    Blood circulation, collateral pain to legs    GERD (gastroesophageal reflux disease)     History of bleeding ulcers     Hypertension     Hyperuricemia 8/25/0480    Metabolic acidosis 6/74/4943    PVD (peripheral vascular disease) ([facility]        DIET:    DIET RENAL;  Diet Tube Feed Continuous/Cyclic w/ Diet     PHYSICAL EXAM:     Patient Vitals for the past 24 hrs:   BP Temp Temp src Pulse Resp SpO2   08/27/20 0725 (!) 110/51 97.4 °F (36.3 °C) Temporal 85 18 94 %   08/26/20 2303 (!) 106/52 96.8 °F (36 °C) Temporal 74 18 94 %   08/26/20 1300 (!) 100/52 -- -- 94 -- --   @      Intake/Output Summary (Last 24 hours) at 8/27/2020 1030  Last data filed at 8/27/2020 0720  Gross per 24 hour   Intake 520 ml   Output 700 ml   Net -180 ml         Wt Readings from Last 3 Encounters:   08/22/20 172 lb (78 kg)   01/08/17 180 lb (81.6 kg)   10/14/14 175 lb (79.4 kg)       Constitutional:  Pt is in no acute distress  Head: normocephalic, atraumatic  Neck: no JVD  Cardiovascular:  S1 S2 no S3 or rub  Respiratory: clear upper, diminisihed in bases  Gastrointestinal:  Soft, nontender, nondistended, + bowel sounds. PEG tube in place  Ext: No edema, soft wrist restraints b/l  Skin: dry, no rash on exposed skin  Neuro: awake and alert. Pleasantly confused. MEDS (scheduled):    potassium bicarb-citric acid  20 mEq Oral BID    risperiDONE  1 mg PEG Tube Daily    midodrine  5 mg PEG Tube TID WC    ferrous sulfate  325 mg PEG Tube Daily with breakfast    sennosides-docusate sodium  2 tablet PEG Tube BID    metoprolol tartrate  25 mg PEG Tube BID    pantoprazole  40 mg Intravenous Daily    And    sodium chloride (PF)  10 mL Intravenous Daily    allopurinol  100 mg PEG Tube Daily    risperiDONE  0.5 mg Oral Once    ceFAZolin  1 g Intravenous See Admin Instructions       MEDS (infusions):   sodium chloride 75 mL/hr at 08/27/20 0322       MEDS (prn):   HYDROcodone-acetaminophen, acetaminophen, diphenhydrAMINE, ondansetron, mineral oil-hydrophilic petrolatum    DATA:    Recent Labs     08/25/20  1055 08/26/20  0449 08/27/20  0644   WBC 7.0 9.3 7.0   HGB 11.1* 11.1* 10.2*   HCT 34.6 34.8 31.4*   .5* 105.5* 106.4*    252 226     Recent Labs     08/25/20  1055 08/26/20  0449 08/27/20  0644    142 136   K 3.2* 3.8 3.3*    103 100   CO2 23 21* 19*   BUN 51* 45* 40*   CREATININE 2.9* 2.7* 2.3*   LABGLOM 15 17 20   GLUCOSE 100* 101* 113*   CALCIUM 8.5* 8.2* 8.3*   ALT 19 12 18   AST 27 22 37*   BILIDIR <0.2 <0.2 <0.2   BILITOT 0.5 0.5 0.5   ALKPHOS 98 102 93   MG 1.2* 1.0* 2.2   PHOS 2.9 5.9* 3.7       Lab Results   Component Value Date    LABALBU 2.6 (L) 08/27/2020    LABALBU 2.9 (L) 08/26/2020 key components of the physical performed independently , case discussed with NP, all pertinent labs and radiologic tests personally reviewed agree with above.       Mary Cote MD

## 2020-08-27 NOTE — PROGRESS NOTES
Physical Therapy  Facility/Department: [facility]  Daily Treatment Note  NAME: Meli Yost  : 1932  MRN: [id number]    Date of Service: 2020   Referring Provider:  Yovana Li DO      Evaluating PT:  Kenisha Patel PT, DPT. [id number]     Room #: 0496-E  Diagnosis:  LETHA   Reason for admission:  Abnormal labs   Precautions:  Falls, confusion, Alarm, B Prevalon Boots, TAPS  Pertinent PMHx: PVD, LETHA   Procedures: none  Equipment Recommendations:  TBD     SUBJECTIVE:  Pt is a poor historian, unable to provide any info. Admitted from NH. Unknown PLOF     OBJECTIVE:    Initial Evaluation  Date:  Treatment Date: 2020    Short Term/ Long Term   Goals   AM-PAC 6 Clicks 8/83  8/15     Was pt agreeable to Eval/treatment? Yes   yes     Does pt have pain? No report  no c/o pain     Bed Mobility  Rolling: Dependent  Supine to sit: NT  Sit to supine: NT  Scooting: Dependent  -pt refused to attempt to sit EOB Rolling: Dependent  Supine to sit: Dep x 2  Sit to supine: Dep x 2  Scooting: Dependent  ModA   Transfers Sit to stand: NT  Stand to sit: NT  Stand pivot: NT   MaxA   Ambulation    NT      TBD   Stair negotiation: ascended and descended  NT   TBD   ROM BUE:  See OT eval   BLE:  Ankle PF contractures       Strength BUE:  See OT eval   BLE:  knee ext 2/5  Ankle DF 1/5   Increase by 1/3 MMT grade    Balance Sitting EOB:  NT  Dynamic Standing:  NT  Sitting EOB:  MaxA  Dynamic Standing:  NT Sitting EOB:  Teresa  Dynamic Standing:  MaxA      -Pt is A & O x 1 to self. (Disoriented to place, time, and situation). -Sensation:  unremarkable   -Edema:  unremarkable      Patient education  Pt educated on role of PT    Patient response to education:   Pt verbalized understanding Pt demonstrated skill Pt requires further education in this area   x x x     ASSESSMENT:    Comments:  Pt received in supine agreeable to PT. Pt confused requiring max cues for facilitation of mobility.  Pt requiring assistance of trunk and BLEs for supine <>sit. Pt sitting statically with severe L trunk lean. Pt requiring assistance and cues to find center of balance. Pt tolerating ~ 15 min EOB with activity limited by fatigue. Pt positioned optimally to promote improvement in respiratory status and prevent skin breakdown/contracture. PT will continue with plan of care, and progress pt as able. Treatment:  Patient practiced and was instructed in the following treatment:     Bed mobility- verbal cues for positioning and sequencing to facilitate independence   Neuromuscular reeducation- verbal cues to correct L trunk lean and use BUE support as able. Pt tolerating ~ 15 min EOB. PLAN:    Patient is making good progress towards established goals. Will continue with current POC.       Time in  1407  Time out  1432    Total Treatment Time  25 minutes     CPT codes:  [] Gait training 84537 0 minutes  [] Manual therapy 16267 0 minutes  [x] Therapeutic activities 54327 10 minutes  [] Therapeutic exercises 26063 0 minutes  [x] Neuromuscular reeducation 36495 15 minutes    Andrea Stiles PT, DPT  [id number]

## 2020-08-27 NOTE — PLAN OF CARE
Problem: Falls - Risk of:  Goal: Will remain free from falls  Description: Will remain free from falls  8/26/2020 2350 by Leslee Benitez RN  Outcome: Met This Shift  8/26/2020 2349 by Leslee Benitez RN  Outcome: Met This Shift  Goal: Absence of physical injury  Description: Absence of physical injury  8/26/2020 2350 by Leslee Benitez RN  Outcome: Met This Shift  8/26/2020 2349 by Leslee Benitez RN  Outcome: Met This Shift     Problem: Restraint Use - Nonviolent/Non-Self-Destructive Behavior:  Goal: Absence of restraint indications  Description: Absence of restraint indications  8/26/2020 1423 by Donna Malone RN  Outcome: Completed  Goal: Absence of restraint-related injury  Description: Absence of restraint-related injury  8/26/2020 1646 by Maeve Cheung RN  Outcome: Completed     Problem: Skin Integrity:  Goal: Will show no infection signs and symptoms  Description: Will show no infection signs and symptoms  8/26/2020 2350 by Leslee Benitez RN  Outcome: Met This Shift  8/26/2020 2349 by Leslee Benitez RN  Outcome: Met This Shift  Goal: Absence of new skin breakdown  Description: Absence of new skin breakdown  8/26/2020 2350 by Leslee Benitez RN  Outcome: Met This Shift  8/26/2020 2349 by Leslee Benitez RN  Outcome: Met This Shift     Problem: Confusion - Acute:  Goal: Absence of continued neurological deterioration signs and symptoms  Description: Absence of continued neurological deterioration signs and symptoms  8/26/2020 2350 by Leslee Benitez RN  Outcome: Met This Shift  8/26/2020 2349 by Leslee Benitez RN  Outcome: Ongoing  Goal: Mental status will be restored to baseline  Description: Mental status will be restored to baseline  8/26/2020 2350 by Leslee Benitez RN  Outcome: Ongoing  8/26/2020 2349 by Leslee Benitez RN  Outcome: Ongoing     Problem: Discharge Planning:  Goal: Ability to perform activities of daily living will improve  Description: Ability to perform activities of daily living will improve  8/26/2020 2350 by Pennie Valencia RN  Outcome: Ongoing  8/26/2020 2349 by Pennie Valencia RN  Outcome: Ongoing  Goal: Participates in care planning  Description: Participates in care planning  8/26/2020 2350 by Pennie Valencia RN  Outcome: Ongoing  8/26/2020 2349 by Pennie Valencia RN  Outcome: Ongoing     Problem: Injury - Risk of, Physical Injury:  Goal: Will remain free from falls  Description: Will remain free from falls  8/26/2020 2350 by Pennie Valencia RN  Outcome: Met This Shift  8/26/2020 2349 by Pennie Valencia RN  Outcome: Met This Shift  Goal: Absence of physical injury  Description: Absence of physical injury  8/26/2020 2350 by Pennie Valencia RN  Outcome: Met This Shift  8/26/2020 2349 by Pennie Valencia RN  Outcome: Met This Shift     Problem: Mood - Altered:  Goal: Mood stable  Description: Mood stable  Outcome: Met This Shift  Goal: Absence of abusive behavior  Description: Absence of abusive behavior  Outcome: Met This Shift  Goal: Verbalizations of feeling emotionally comfortable while being cared for will increase  Description: Verbalizations of feeling emotionally comfortable while being cared for will increase  Outcome: Met This Shift     Problem: Psychomotor Activity - Altered:  Goal: Absence of psychomotor disturbance signs and symptoms  Description: Absence of psychomotor disturbance signs and symptoms  8/26/2020 2350 by Pennie Valencia RN  Outcome: Ongoing  8/26/2020 2349 by Pennie Valencia RN  Outcome: Met This Shift     Problem: Sleep Pattern Disturbance:  Goal: Appears well-rested  Description: Appears well-rested  8/26/2020 2350 by Pennie Valencia RN  Outcome: Met This Shift  8/26/2020 2349 by Pennie Valencia RN  Outcome: Ongoing     Problem: Musculor/Skeletal Functional Status  Goal: Highest potential functional level  8/26/2020 2350 by Pennie Valencia RN  Outcome: Met This Shift  8/26/2020 2349 by Pennie Valencia RN  Outcome: Ongoing  Goal: Absence of falls  8/26/2020 2350 by Pennie Valencia RN  Outcome: Met This Shift  8/26/2020 2349 by Nay Frances RN  Outcome: Ongoing     Problem: Inadequate oral food/beverage intake (NI-2.1)  Goal: Food and/or Nutrient Delivery  Description: Individualized approach for food/nutrient provision.   8/26/2020 1023 by Chante Mcdowell RD, LD  Outcome: Ongoing

## 2020-08-27 NOTE — PROGRESS NOTES
to long sit in bed with Mod A to maintain  Standing: NT  Sitting:      Static: Max A to maintain EOB with constant cues ~15 min.    Dynamic:Dep     Endurance/Activity Tolerance F-; pt emotionally labile limited by cognition  Poor+/fair-  F+    during 15-20 min ADL task    Visual/  Perceptual Glasses: grossly WFL acuity           UE strengthening     See UE Assessment Below  Fair tolerance  B shoulders limited AROM, crepitus noted. Pt. able to perform B elbow, wrist & hand AROM ex. G tolerance  For BUE AROM/AAROM in all planes to improve overall function for ADL tasks        Comments: Upon arrival pt supine in bed. Therapist facilitated bed mobility, functional sitting balance/endurance during ADLs at EOB, B UE ROM ex. all with focus on safety, technique, precautions. Pt. Instructed RE: safe transfers/mobility, ADLs, role of OT, treatment plan, recs. , prec. Pt. with noted confusion this day, required mod/max. cues to re-orient, mod. cues to re-direct to task. At end of session returned to supine with all needs met, in optimal position for skin integrity, Prevalon boots re-applied, all needs met, RN notified, bed alarm re-applied, all lines and tubes intact, call light within reach. · Pt has made fair- progress towards set goals. · Continue with current plan of care    Time in: 14:05  Time out: 14:30  Total Tx Time: [address].  MANDA SiegelR/L   License #  [id number]

## 2020-08-27 NOTE — CARE COORDINATION
Discharge plan is to return to [facility] when medically stable. She has been sitter-free > 24H. Per Dr Yun Cantu, the pt will probably be ready for discharge tomorrow, 8/28. Envelope and ambulance form in soft chart.  Carleen Egan RN -[phone]

## 2020-08-28 NOTE — DISCHARGE SUMMARY
Physician Discharge Summary     Patient ID:  Lidia Cummings  [id number]  67 y.o.  11/24/1932    Admit date: 8/18/2020    Discharge date and time: 8/28/2020     Admission Diagnoses: LETHA (acute kidney injury) 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 Oral intake remained inadequate and PEG tube placed prior to discharge. Tube feedings tolerated well and renal function approaching baseline. The patient is discharged in improved and stable condition with OP f/u as tolerated. Repeat BMP recommended in 5-7 days.     CBC with Differential:    Lab Results   Component Value Date    WBC 8.3 08/28/2020    RBC 2.89 08/28/2020    HGB 9.9 08/28/2020    HCT 30.0 08/28/2020     08/28/2020    .8 08/28/2020    MCH 34.3 08/28/2020    MCHC 33.0 08/28/2020    RDW 13.6 08/28/2020    NRBC 0.9 01/08/2017    SEGSPCT 66 12/06/2013    LYMPHOPCT 31.4 08/28/2020    MONOPCT 5.6 08/28/2020    BASOPCT 0.2 08/28/2020    MONOSABS 0.46 08/28/2020    LYMPHSABS 2.59 08/28/2020    EOSABS 0.06 08/28/2020    BASOSABS 0.02 08/28/2020     CMP:    Lab Results   Component Value Date     08/28/2020    K 3.6 08/28/2020     08/28/2020    CO2 21 08/28/2020    BUN 37 08/28/2020    CREATININE 2.1 08/28/2020    GFRAA 27 08/28/2020    LABGLOM 22 08/28/2020    GLUCOSE 137 08/28/2020    GLUCOSE 100 05/21/2012 PROT 5.9 08/28/2020    LABALBU 2.7 08/28/2020    LABALBU 4.2 05/21/2012    CALCIUM 8.6 08/28/2020    BILITOT 0.4 08/28/2020    ALKPHOS 111 08/28/2020    AST 27 08/28/2020    ALT 16 08/28/2020     BMP:    Lab Results   Component Value Date     08/28/2020    K 3.6 08/28/2020     08/28/2020    CO2 21 08/28/2020    BUN 37 08/28/2020    LABALBU 2.7 08/28/2020    LABALBU 4.2 05/21/2012    CREATININE 2.1 08/28/2020    CALCIUM 8.6 08/28/2020    GFRAA 27 08/28/2020    LABGLOM 22 08/28/2020    GLUCOSE 137 08/28/2020    GLUCOSE 100 05/21/2012       Disposition: SNF    Patient Instructions:     Medication List      START taking these medications    allopurinol 100 MG tablet  Commonly known as:  ZYLOPRIM  1 tablet by PEG Tube route daily  Start taking on:  August 29, 2020     metoprolol tartrate 25 MG tablet  Commonly known as:  LOPRESSOR  1 tablet by PEG Tube route 2 times daily     midodrine 5 MG tablet  Commonly known as:  PROAMATINE  1 tablet by PEG Tube route 3 times daily (with meals)     mineral oil-hydrophilic petrolatum ointment  Apply topically as needed.      sennosides-docusate sodium 8.6-50 MG tablet  Commonly known as:  SENOKOT-S  2 tablets by PEG Tube route 2 times daily     sodium bicarbonate 650 MG tablet  Take 1 tablet by mouth daily  Start taking on:  August 29, 2020        CHANGE how you take these medications    ferrous sulfate 325 (65 Fe) MG tablet  Commonly known as:  IRON 325  1 tablet by PEG Tube route daily (with breakfast)  Start taking on:  August 29, 2020  What changed:    · how to take this  · when to take this        CONTINUE taking these medications    acetaminophen 325 MG tablet  Commonly known as:  TYLENOL     diphenhydrAMINE 25 MG tablet  Commonly known as:  Benadryl  Take 1 tablet by mouth every 6 hours as needed for Itching     mirtazapine 15 MG tablet  Commonly known as:  REMERON     omeprazole 20 MG delayed release capsule  Commonly known as:  PRILOSEC     therapeutic

## 2020-08-28 NOTE — PROGRESS NOTES
Nurse to nurse called to April RN at [facility] operated by [facility]. All questions answered.   Sylwia Michel faxed to [phone]

## 2020-08-28 NOTE — PROGRESS NOTES
[facility]  Nephrology Progress Note    SUBJECTIVE:   8/21: Patient seen and examined. K and PO4 low today, being replaced. Renal function slowly improving  8/22: pt seen in room  8/23: pt agitated. 8/24: Resting in bed. Pleasant at this time. Denies sob, chest pain, abd pain. 8/25: Agitated, calling out for help but doesn't say what help is needed. Had PEG placed this morning.  8/26: Pleasantly confused. Cooperative this morning. States she feels good. 8/27/2020- awake and alert. She is pleasant and cooperative. Pleasantly confused. 8/28/2020- awake and alert. Pleasant. No distress.        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 ([facility]    Closed displaced supracondylar fracture with intracondylar extension of lower end of right femur with malunion    Osteoarthritis of right knee    LETHA (acute kidney injury) ([facility]    PVD (peripheral vascular disease) ([facility]    GERD (gastroesophageal reflux disease)    History of bleeding ulcers    Metabolic acidosis    Hyperuricemia        PAST MEDICAL HISTORY:    Past Medical History:   Diagnosis Date    LETHA (acute kidney injury) ([facility] 8/18/2020    Bleeding ulcer 2008    Blood circulation, collateral pain to legs    GERD (gastroesophageal reflux disease)     History of bleeding ulcers     Hypertension     Hyperuricemia 9/45/1147    Metabolic acidosis 9/41/3125    PVD (peripheral vascular disease) ([facility])        DIET:    DIET RENAL;  Diet Tube Feed Continuous/Cyclic w/ Diet     PHYSICAL EXAM:     Patient Vitals for the past 24 hrs:   BP Temp Temp src Pulse Resp SpO2   08/28/20 0720 (!) 110/56 97.5 °F (36.4 °C) Temporal 95 18 94 %   08/28/20 0000 (!) 105/54 97.1 °F (36.2 °C) Temporal 78 18 --   08/27/20 1610 (!) 107/59 97.3 °F (36.3 °C) Temporal 80 18 95 %   @      Intake/Output Summary (Last 24 hours) at 8/28/2020 1000  Last data filed at 8/27/2020 1610  Gross per 24 hour   Intake 605 ml   Output --   Net 605 ml         Wt Readings from Last 3 Encounters:   08/22/20 172 lb (78 kg)   01/08/17 180 lb (81.6 kg)   10/14/14 175 lb (79.4 kg)       Constitutional:  Pt is in no acute distress  Head: normocephalic, atraumatic  Neck: no JVD  Cardiovascular:  S1 S2 no S3 or rub  Respiratory: clear upper, diminisihed in bases  Gastrointestinal:  Soft, nontender, nondistended, + bowel sounds. PEG tube in place  Ext: No edema. Skin: dry, no rash on exposed skin  Neuro: awake and alert. Pleasantly confused. MEDS (scheduled):    sodium bicarbonate  650 mg Oral Daily    risperiDONE  1 mg PEG Tube Daily    midodrine  5 mg PEG Tube TID WC    ferrous sulfate  325 mg PEG Tube Daily with breakfast    sennosides-docusate sodium  2 tablet PEG Tube BID    metoprolol tartrate  25 mg PEG Tube BID    pantoprazole  40 mg Intravenous Daily    And    sodium chloride (PF)  10 mL Intravenous Daily    allopurinol  100 mg PEG Tube Daily    risperiDONE  0.5 mg Oral Once    ceFAZolin  1 g Intravenous See Admin Instructions       MEDS (infusions):      MEDS (prn):   HYDROcodone-acetaminophen, acetaminophen, diphenhydrAMINE, ondansetron, mineral oil-hydrophilic petrolatum    DATA:    Recent Labs     08/26/20  0449 08/27/20  0644 08/28/20  0737   WBC 9.3 7.0 8.3   HGB 11.1* 10.2* 9.9*   HCT 34.8 31.4* 30.0*   .5* 106.4* 103.8*    226 237     Recent Labs     08/26/20  0449 08/27/20  0644 08/28/20  0737    136 133   K 3.8 3.3* 3.6    100 100   CO2 21* 19* 21*   BUN 45* 40* 37*   CREATININE 2.7* 2.3* 2.1*   LABGLOM 17 20 22   GLUCOSE 101* 113* 137*   CALCIUM 8.2* 8.3* 8.6   ALT 12 18 16   AST 22 37* 27   BILIDIR <0.2 <0.2 <0.2   BILITOT 0.5 0.5 0.4   ALKPHOS 102 93 111*   MG 1.0* 2.2 2.0   PHOS 5.9* 3.7 3.0       Lab Results   Component Value Date    LABALBU 2.7 (L) 08/28/2020    LABALBU 2.6 (L) 08/27/2020    LABALBU 2.9 (L) 08/26/2020     Lab Results   Component Value Date    TSH 2.240 08/19/2020       Iron Studies  No results found for: IRON, TIBC, FERRITIN  Vitamin B-12   Date Value Ref Range Status   08/19/2020 >2000 (H) 211 - 946 pg/mL Final     Folate   Date Value Ref Range Status   08/19/2020 >20.0 4.8 - 24.2 ng/mL Final       Vit D, 25-Hydroxy   Date Value Ref Range Status   07/14/2020 30 30 - 100 ng/mL Final     Comment:     <20 ng/mL. ........... Cathye Fern Deficient  20-30 ng/mL. ......... Cathye Fern Insufficient   ng/mL. ........ Cathye Fern Sufficient  >100 ng/mL. .......... Cathye Fern Toxic       No results found for: PTH    No components found for: URIC    Lab Results   Component Value Date    COLORU Yellow 08/24/2020    NITRU POSITIVE 08/24/2020    GLUCOSEU Negative 08/24/2020    KETUA Negative 08/24/2020    UROBILINOGEN 0.2 08/24/2020    BILIRUBINUR Negative 08/24/2020       No results found for: Vasile Stoll      IMPRESSION/RECOMMENDATIONS:      1. Stage III Acute kidney injury  Likely pre-renal in the setting of poor po intake/ dehydration   Baseline Cr 0.7 in July 2020  Abdominal CT no hydro, atrophic kidneys  FeNa >1%  Continue IVF until PEG feedings/free water established  Follow labs daily  UOP not recorded in the  last 24 hours  Cr 7.1->7.0->6.5->5.2>4.5>3.9>3.6>2.9>2.7> 2.3>2. 1       2. Metabolic acidosis  In the setting of LETHA  Improved on bicarb drip  Bicarb drip- Stopped  CO2 21 today,  goal >22  Added a dose of daily bicarb per PEG 8/27     3. Hyperuricemia  8/19 uric acid 13.9  8/25 uric acid 7.2, improved with hydration     4. Hypomagnesemia with hypokalemia-  K and Mag replace prn  8/26 Mg 1.0 - supplement with Mg sulfate 4 gms> 2.2> 2.0  K 3.6 post replete. 5. Hypophosphatemia   In the setting of the decreased intake  Replace prn  8/27 PO4 3.0    6.  Hypernatremia  On hypotonic fluids  8/28 Na+ 133  Off IVF, adjust free water with PEG       TIKA Unger - CNP     Patient seen and examined all key components of the physical performed independently , case discussed with NP, all pertinent labs and radiologic tests personally reviewed agree with above.       Nanci Guzman MD

## 2020-08-28 NOTE — CARE COORDINATION
Transportation has been arranged for 2:00PM with Physician''s ambulance. Facility, nursing and friend, Brunilda Cruz, notified.  Juan Francisco Carson RN -[phone]

## 2020-08-28 NOTE — PLAN OF CARE
Problem: Falls - Risk of:  Goal: Will remain free from falls  Description: Will remain free from falls  Outcome: Met This Shift     Problem: Discharge Planning:  Goal: Ability to perform activities of daily living will improve  Description: Ability to perform activities of daily living will improve  Outcome: Ongoing     Problem: Injury - Risk of, Physical Injury:  Goal: Will remain free from falls  Description: Will remain free from falls  Outcome: Met This Shift Patient baseline mental status

## 2020-08-28 NOTE — PROGRESS NOTES
Chief Complaint:   AMS, renal failure    Subjective:  Post op PEG  The patient is alert and remains confused and much less agitated per staff. No new problems overnight. Denies chest pain & SOB . Denies abdominal pain. Tolerating diet. No nausea or vomiting. Tube feedings advanced and tolerated well. Oral intake remains poor and declining meals. Objective:    Vitals:    20 0720   BP: (!) 110/56   Pulse: 95   Resp: 18   Temp: 97.5 °F (36.4 °C)   SpO2: 94%     Awake and confused , skin turgor near nl, NAD  Heart:  RRR, no murmurs, gallops, or rubs. Lungs:  CTA bilaterally, no wheeze, rales or rhonchi  Abd: bowel sounds present, nontender, nondistended, no masses  Extrem:  No clubbing, cyanosis, or edema       Lab Results   Component Value Date     2020    K 3.3 2020     2020    CO2 19 2020    BUN 40 2020    CREATININE 2.3 2020    CALCIUM 8.3 2020      Lab Results   Component Value Date    WBC 7.0 2020    RBC 2.95 2020    HGB 10.2 2020    HCT 31.4 2020    .4 2020    MCH 34.6 2020    MCHC 32.5 2020    RDW 13.8 2020     2020    MPV 10.6 2020     PT/INR:    Lab Results   Component Value Date    PROTIME 11.3 2013    INR 1.0 2013     No results for input(s): POCGLU in the last 72 hours. Recent Labs     20  1055 20  0449 20  0644    142 136   K 3.2* 3.8 3.3*    103 100   CO2 23 21* 19*   BUN 51* 45* 40*   LABALBU 3.0* 2.9* 2.6*   CREATININE 2.9* 2.7* 2.3*   CALCIUM 8.5* 8.2* 8.3*   GFRAA 19 20 24   LABGLOM 15 17 20   GLUCOSE 100* 101* 113*       Ct Abdomen Pelvis Wo Contrast Additional Contrast? None    Result Date: 2020  Patient MRN:  [id number] : 1932 Age: 80 years Gender: Female Order Date:  2020 4:25 PM EXAM: CT ABDOMEN PELVIS WO CONTRAST number of images 379.  Technique: Low-dose CT  acquisition technique included one of following options; 1 . Automated exposure control, 2. Adjustment of MA and or KV according to patient's size or 3. Use of iterative reconstruction. INDICATION:  renal failure, r/o obstruction, r/o hydro renal failure, r/o obstruction, r/o hydro COMPARISON: 2013 FINDINGS: The lung bases demonstrate minimal atelectasis/groundglass opacities. There is coronary artery calcification. Liver is normal. Gallbladder is distended. Spleen, pancreas, and adrenals are normal. The kidneys are somewhat atrophic with the large cystic lesions bilaterally, the largest one in the right kidney measuring 3.5 cm. Degenerative changes are identified in the lumbar spine with a nearly 40% compression deformity of the inferior endplate of C77 without significant change. Pelvis. Bladder is partially distended. There is constipation with a distended thickened rectum. The appendix is not identified. Constipation with thickened distended rectum concerning for proctitis. There is no hydronephrosis or obstructing uropathy. Kidneys are somewhat atrophic. Atelectasis/infiltrates and groundglass densities in the lung bases which may be due to mild CHF and or pneumonia. Ct Head Wo Contrast    Result Date: 2020  Patient MRN:  [id number] : 1932 Age: 80 years Gender: Female Order Date:  2020 4:25 PM EXAM: CT HEAD WO CONTRAST INDICATION:  AMS AMS  COMPARISON: CT head without contrast, 2017 FINDINGS: PARENCHYMA:No mass effect, edema or hemorrhage. Moderate volume loss is seen in the cerebrum with mild to moderate chronic microvascular ischemic changes. VENTRICLES:No hydrocephalus. CALVARIUM:The calvarium is intact without fracture or focal lesion. SINUSES:The visualized paranasal sinuses and mastoids are clear. No acute intracranial abnormality.       Scheduled Meds:   sodium bicarbonate  650 mg Oral Daily    risperiDONE  1 mg PEG Tube Daily    midodrine  5 mg PEG Tube TID WC    ferrous sulfate  325 mg PEG Tube Daily with breakfast    sennosides-docusate sodium  2 tablet PEG Tube BID    metoprolol tartrate  25 mg PEG Tube BID    pantoprazole  40 mg Intravenous Daily    And    sodium chloride (PF)  10 mL Intravenous Daily    allopurinol  100 mg PEG Tube Daily    risperiDONE  0.5 mg Oral Once    ceFAZolin  1 g Intravenous See Admin Instructions     Continuous Infusions:    PRN Meds:. HYDROcodone-acetaminophen, acetaminophen, diphenhydrAMINE, ondansetron, mineral oil-hydrophilic petrolatum    I/O last 3 completed shifts: In: 695 [I.V.:302; NG/GT:615]  Out: -   No intake/output data recorded.     Intake/Output Summary (Last 24 hours) at 8/28/2020 0748  Last data filed at 8/27/2020 1610  Gross per 24 hour   Intake 637 ml   Output --   Net 637 ml       Assessment:    Active Hospital Problems    Diagnosis    Hypertension [I10]     Priority: Medium    Osteoarthritis [M19.90]     Priority: Medium    Metabolic acidosis [B76.4]    Hyperuricemia [E79.0]    GERD (gastroesophageal reflux disease) [K21.9]    History of bleeding ulcers [Z87.11]    LETHA (acute kidney injury) ([facility] [N17.9]    PVD (peripheral vascular disease) ([facility]) [I73.9]    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 [facility] [F02.80]       Plan:  Off IVF             Less confused and agitated             Oral intake remains poor             RFT improving slowly and nearing baseline, AG nl             Renal f/u reviewed              Condition improving             Palliative care consult reviewed             Correct low K+               PEG placed              Continue Risperdal for agitated behavior and appetite stimulation--continue present dose  Geriatric consult pending--d/w Dr Roosevelt Lerma DO  7:48 AM  8/28/2020

## 2020-09-23 NOTE — CONSULTS
Geriatric consult 8/24/2020    Patient assessed   Hx of Dementia and admitted with severe dehydration and azotemia  Seen by Renal  Hx of Right femur FX and immobile   Now with multifactorial confusion and now with agitation and sundowning  Medical problems being addressed   Awake with intermittent delirium  Mariel appears to be working  Will follow with you

## 2020-12-28 PROBLEM — R41.0 DELIRIUM, ACUTE: Status: ACTIVE | Noted: 2020-01-01

## 2020-12-28 PROBLEM — N30.00 ACUTE CYSTITIS WITHOUT HEMATURIA: Status: ACTIVE | Noted: 2020-01-01

## 2020-12-28 PROBLEM — R79.89 ELEVATED LACTIC ACID LEVEL: Status: ACTIVE | Noted: 2020-01-01

## 2020-12-28 PROBLEM — N39.0 SEPSIS SECONDARY TO UTI (HCC): Status: ACTIVE | Noted: 2020-01-01

## 2020-12-28 PROBLEM — D53.9 ANEMIA, MACROCYTIC: Status: ACTIVE | Noted: 2020-01-01

## 2020-12-28 PROBLEM — F03.90 DEMENTIA (HCC): Status: ACTIVE | Noted: 2020-01-01

## 2020-12-28 PROBLEM — A41.9 SEPSIS SECONDARY TO UTI (HCC): Status: ACTIVE | Noted: 2020-01-01

## 2020-12-28 NOTE — H&P
History and Physical      CHIEF COMPLAINT:  AMS      HISTORY OF PRESENT ILLNESS:      The patient is a 80 y.o. female patient of Dr. Kalia Justice  presents from a SNF with a change in mental status. The patient drowsy and confused when alert with history obtained from the medical records and d/w the ER physician. A feeding tube placed during her previous hospital stay d/t inadequate intake.     Past Medical History:    Past Medical History:   Diagnosis Date    LETHA (acute kidney injury) ([address] 8/18/2020    Bleeding ulcer 2008    Blood circulation, collateral pain to legs    GERD (gastroesophageal reflux disease)     History of bleeding ulcers     Hypertension     Hyperuricemia 0/30/2147    Metabolic acidosis 5/66/0495    PVD (peripheral vascular disease) ([facility])        Past Surgical History:    Past Surgical History:   Procedure Laterality Date    ENDOSCOPY, COLON, DIAGNOSTIC  colonoscopy    FRACTURE SURGERY Right 12/7/2013    ORIF RIGHT FEMUR    HYSTERECTOMY  early 80's    TONSILLECTOMY  as a child    UPPER GASTROINTESTINAL ENDOSCOPY N/A 8/25/2020    EGD PEG TUBE INSERTION performed by Solitario Pathak MD at [address] [place] [address]  8/25/2020    EGD BIOPSY performed by Solitario Pathak MD at [address] [place] [address] as a child       Medications Prior to Admission:    Medications Prior to Admission: Cranberry 500 MG TABS, Take 500 mg by mouth daily  rivastigmine (EXELON) 4.6 MG/24HR, Place 1 patch onto the skin daily  ferrous sulfate (IRON 325) 325 (65 Fe) MG tablet, Take 325 mg by mouth 2 times daily  metoprolol succinate (TOPROL XL) 50 MG extended release tablet, Take 50 mg by mouth daily  polyethylene glycol (GLYCOLAX) 17 g packet, Take 17 g by mouth daily as needed for Constipation  potassium chloride (KLOR-CON M) 20 MEQ extended release tablet, Take 20 mEq by mouth daily  risperiDONE (RISPERDAL) 0.5 MG tablet, Take 0.5 mg by mouth 2 times daily  vitamin D (ERGOCALCIFEROL) 1.25 MG (23530 UT) CAPS capsule, Take 50,000 Units by mouth every 14 days Given on the 3rd and the 17 th  omeprazole (PRILOSEC) 20 MG capsule, Take 20 mg by mouth daily. acetaminophen (TYLENOL) 325 MG tablet, Take 650 mg by mouth every 4 hours as needed for Pain or Fever Indications: Pain     Allergies:    Patient has no known allergies. Social History:    reports that she has never smoked. She has never used smokeless tobacco. She reports that she does not drink alcohol or use drugs. Family History:   family history is not on file. REVIEW OF SYSTEMS:  As above in the HPI, otherwise negative    PHYSICAL EXAM:    Vitals:  BP (!) 112/55   Pulse 73   Temp 97.5 °F (36.4 °C) (Axillary)   Resp 20   Ht 5' 3\" (1.6 m)   Wt 173 lb (78.5 kg)   SpO2 93%   BMI 30.65 kg/m²     General:   Drowsy, confused, disoriented. No acute distress. HEENT:    Normocephalic, atraumatic. Pupils equal, round, reactive to light. No scleral icterus. No conjunctival injection. Oropharynx clear. No nasal discharge. Neck:       Supple. No bruits, adenopathy, masses, neck vein distention. Trachea in the midline. Heart:      RRR, no murmurs, gallops, rubs  Lungs:     CTA bilaterally, bilat symmetrical expansion, no wheeze, rales, or rhonchi  Abdomen:   Bowel sounds present, soft, nontender, no masses, no organomegaly, no peritoneal signs  Extremities:  No clubbing, cyanosis, or edema  Skin:         Warm and dry, no open lesions or rash, poor turgor  Neuro:     Cranial nerves 2-12 intact, no focal deficits  Breast:    deferred  Rectal:    deferred  Genitalia:  deferred    LABS:    CBC with Differential:    Lab Results   Component Value Date    WBC 8.1 12/29/2020    RBC 3.21 12/29/2020    HGB 11.7 12/29/2020    HCT 34.4 12/29/2020     12/29/2020    .2 12/29/2020    MCH 36.4 12/29/2020    MCHC 34.0 12/29/2020    RDW 14.7 12/29/2020    NRBC 0.9 01/08/2017    SEGSPCT 66 12/06/2013    LYMPHOPCT 30.6 12/29/2020    MONOPCT 5.9 12/29/2020    BASOPCT 0.5 12/29/2020    MONOSABS 0.48 12/29/2020    LYMPHSABS 2.49 12/29/2020    EOSABS 0.03 12/29/2020    BASOSABS 0.04 12/29/2020     CMP:    Lab Results   Component Value Date     12/29/2020    K 3.0 12/29/2020    CL 95 12/29/2020    CO2 28 12/29/2020    BUN 52 12/29/2020    CREATININE 0.9 12/29/2020    GFRAA >60 12/29/2020    LABGLOM 59 12/29/2020    GLUCOSE 109 12/29/2020    GLUCOSE 100 05/21/2012    PROT 7.2 12/28/2020    LABALBU 4.0 12/28/2020    LABALBU 4.2 05/21/2012    CALCIUM 9.4 12/29/2020    BILITOT 0.4 12/28/2020    ALKPHOS 98 12/28/2020    AST 17 12/28/2020    ALT 18 12/28/2020     U/A:    Lab Results   Component Value Date    COLORU Yellow 12/28/2020    PROTEINU Negative 12/28/2020    PHUR 7.5 12/28/2020    WBCUA >20 12/28/2020    RBCUA 1-3 12/28/2020    RBCUA 0-1 11/13/2013    BACTERIA MANY 12/28/2020    CLARITYU Clear 12/28/2020    SPECGRAV 1.010 12/28/2020    LEUKOCYTESUR MODERATE 12/28/2020    UROBILINOGEN 1.0 12/28/2020    BILIRUBINUR Negative 12/28/2020    BLOODU TRACE-INTACT 12/28/2020    GLUCOSEU Negative 12/28/2020       ASSESSMENT:      Active Hospital Problems    Diagnosis Date Noted    Acute cystitis without hematuria [N30.00] 12/28/2020    Sepsis secondary to UTI ([address] [A41.9, N39.0] 12/28/2020    Elevated lactic acid level [R79.89] 12/28/2020    Delirium, acute [R41.0] 12/28/2020    Anemia, macrocytic [D53.9] 12/28/2020    Dementia ([address] [F03.90] 12/28/2020        PLAN:  Admit to a medical floor               ATB pending cultures               IVF               Continue TF               Maintain tx for               Check B12, folates and iron studies          Souleymane Hancock DO  11:47 AM  12/29/2020

## 2020-12-28 NOTE — ED PROVIDER NOTES
Rajani Boothe is an 80-year-old female who presents with a chief complaint of altered mental status. History comes from EMS as the patient is demented with altered mental status. Patient has been having a decline over the last 6 months, ever since being diagnosed with Covid in the summer. Patient is normally able to converse despite her dementia, however she has recently been having a very difficult time expressing herself. This morning she was found to be breathing at approximately 30 breaths a minute and visibly uncomfortable despite maintaining a normal O2 saturation. For this reason she was transported to [facility] emergency department for further evaluation and treatment. On arrival, she was assessed with history, physical exam, imaging studies, laboratory studies, EKG, vital signs. Review of Systems   Unable to perform ROS: Dementia        Physical Exam  Vitals signs and nursing note reviewed. Constitutional:       Appearance: She is well-developed. She is ill-appearing. HENT:      Head: Normocephalic and atraumatic. Eyes:      Pupils: Pupils are equal, round, and reactive to light. Neck:      Musculoskeletal: Normal range of motion and neck supple. Cardiovascular:      Rate and Rhythm: Normal rate and regular rhythm. Pulmonary:      Effort: Pulmonary effort is normal. No respiratory distress. Breath sounds: Normal breath sounds. No wheezing or rales. Abdominal:      General: Bowel sounds are normal.      Palpations: Abdomen is soft. Tenderness: There is no abdominal tenderness. There is no guarding or rebound. Skin:     General: Skin is warm and dry. Neurological:      Mental Status: She is alert and oriented to person, place, and time. Cranial Nerves: No cranial nerve deficit.       Coordination: Coordination normal.          Procedures     MDM  Number of Diagnoses or Management Options  Diagnosis management comments: Emergency department evaluation was notable for a nitrite positive UTI in the setting of altered mental status with worsening lactic acidosis. This information was relayed to the patient who understood this plan of care and was amenable to the plan. Patient was discussed with the admitting service (Dr. Elaine Roy) who concurred with the decision for admission, and have agreed to admit the patient to med/surg          --------------------------------------------- PAST HISTORY ---------------------------------------------  Past Medical History:  has a past medical history of LETHA (acute kidney injury) ([facility] Bleeding ulcer, Blood circulation, collateral, GERD (gastroesophageal reflux disease), History of bleeding ulcers, Hypertension, Hyperuricemia, Metabolic acidosis, and PVD (peripheral vascular disease) ([facility] Past Surgical History:  has a past surgical history that includes Hysterectomy (early 80's); Endoscopy, colon, diagnostic (colonoscopy); Tonsillectomy (as a child); Wrist fracture surgery (Right, as a child); fracture surgery (Right, 12/7/2013); Upper gastrointestinal endoscopy (N/A, 8/25/2020); and Upper gastrointestinal endoscopy (8/25/2020). Social History:  reports that she has never smoked. She has never used smokeless tobacco. She reports that she does not drink alcohol or use drugs. Family History: family history is not on file. The patients home medications have been reviewed. Allergies: Patient has no known allergies.     -------------------------------------------------- RESULTS -------------------------------------------------    LABS:  Results for orders placed or performed during the hospital encounter of 12/28/20   CBC Auto Differential   Result Value Ref Range    WBC 10.8 4.5 - 11.5 E9/L    RBC 3.45 (L) 3.50 - 5.50 E12/L    Hemoglobin 12.7 11.5 - 15.5 g/dL    Hematocrit 36.2 34.0 - 48.0 %    .9 (H) 80.0 - 99.9 fL    MCH 36.8 (H) 26.0 - 35.0 pg    MCHC 35.1 (H) 32.0 - 34.5 %    RDW 14.7 11.5 - 15.0 fL    Platelets 435 (H) 396 - 450 E9/L    MPV 10.2 7.0 - 12.0 fL    Neutrophils % 65.0 43.0 - 80.0 %    Immature Granulocytes % 0.6 0.0 - 5.0 %    Lymphocytes % 28.7 20.0 - 42.0 %    Monocytes % 5.3 2.0 - 12.0 %    Eosinophils % 0.2 0.0 - 6.0 %    Basophils % 0.2 0.0 - 2.0 %    Neutrophils Absolute 7.01 1.80 - 7.30 E9/L    Immature Granulocytes # 0.07 E9/L    Lymphocytes Absolute 3.09 1.50 - 4.00 E9/L    Monocytes Absolute 0.57 0.10 - 0.95 E9/L    Eosinophils Absolute 0.02 (L) 0.05 - 0.50 E9/L    Basophils Absolute 0.02 0.00 - 0.20 E9/L   Comprehensive Metabolic Panel w/ Reflex to MG   Result Value Ref Range    Sodium 131 (L) 132 - 146 mmol/L    Potassium reflex Magnesium 3.3 (L) 3.5 - 5.0 mmol/L    Chloride 89 (L) 98 - 107 mmol/L    CO2 30 (H) 22 - 29 mmol/L    Anion Gap 12 7 - 16 mmol/L    Glucose 135 (H) 74 - 99 mg/dL    BUN 51 (H) 8 - 23 mg/dL    CREATININE 0.9 0.5 - 1.0 mg/dL    GFR Non-African American 59 >=60 mL/min/1.73    GFR African American >60     Calcium 9.9 8.6 - 10.2 mg/dL    Total Protein 7.2 6.4 - 8.3 g/dL    Alb 4.0 3.5 - 5.2 g/dL    Total Bilirubin 0.4 0.0 - 1.2 mg/dL    Alkaline Phosphatase 98 35 - 104 U/L    ALT 18 0 - 32 U/L    AST 17 0 - 31 U/L   Troponin   Result Value Ref Range    Troponin <0.01 0.00 - 0.03 ng/mL   Brain Natriuretic Peptide   Result Value Ref Range    Pro- 0 - 450 pg/mL   Urinalysis, reflex to microscopic   Result Value Ref Range    Color, UA Yellow Straw/Yellow    Clarity, UA Clear Clear    Glucose, Ur Negative Negative mg/dL    Bilirubin Urine Negative Negative    Ketones, Urine Negative Negative mg/dL    Specific Gravity, UA 1.010 1.005 - 1.030    Blood, Urine TRACE-INTACT Negative    pH, UA 7.5 5.0 - 9.0    Protein, UA Negative Negative mg/dL    Urobilinogen, Urine 1.0 <2.0 E.U./dL    Nitrite, Urine POSITIVE (A) Negative    Leukocyte Esterase, Urine MODERATE (A) Negative   Lactate, Sepsis   Result Value Ref Range    Lactic Acid, Sepsis 2.0 (H) 0.5 - 1.9 results, in addition to providing specific details for the plan of care and counseling regarding the diagnosis and prognosis. Their questions are answered at this time and they are agreeable with the plan of admission.    --------------------------------- ADDITIONAL PROVIDER NOTES ---------------------------------  Consultations:  Time: 5:59 PM EST. Spoke with Dr. Marsha Ibarra. Discussed case. They will admit the patient. This patient's ED course included: a personal history and physicial examination, re-evaluation prior to disposition, multiple bedside re-evaluations, IV medications and cardiac monitoring    This patient has remained hemodynamically stable during their ED course. Diagnosis:  1. Acute cystitis without hematuria    2. Altered mental status, unspecified altered mental status type        Disposition:  Patient's disposition: Admit to med/surg floor  Patient's condition is fair.                    Álvaro Valdez 43., DO  Resident  12/28/20 8117

## 2020-12-29 NOTE — PLAN OF CARE
Problem: Falls - Risk of:  Goal: Will remain free from falls  Description: Will remain free from falls  12/29/2020 1032 by Naya Yanez RN  Outcome: Met This Shift

## 2020-12-29 NOTE — CONSULTS
Comprehensive Nutrition Assessment    Type and Reason for Visit:  Initial, Consult, Positive Nutrition Screen(TF ordering/management)    Nutrition Recommendations/Plan: Continue NPO. Modify TF as current TF exceeding estimated nutrient needs    TF recommendation:  Standard w/ Fiber @ 55 ml/hr  Will provide 1320 ml TV, 1584 kcals, 73 gm pro, 1065 ml free water  Recommend Flush 100 ml water Q6 hrs to provide 1465 ml total fluids/day  Regimen will meet 100% est nutrient needs    Nutrition Assessment:  Pt admit w/ noted acute cystitis, sepsis 2/2 UTI w/ h/o dementia. Pt w/ recent PEG placement receiving EN. Will provide updated TF recs and monitor. Malnutrition Assessment:  Malnutrition Status: At risk for malnutrition (Comment)    Context:  Chronic Illness     Findings of the 6 clinical characteristics of malnutrition:  Energy Intake:  7 - 75% or less estimated energy requirements for 1 month or longer  Weight Loss:  Unable to assess     Body Fat Loss:  Unable to assess     Muscle Mass Loss:  Unable to assess    Fluid Accumulation:  No significant fluid accumulation     Strength:  Not Performed    Estimated Daily Nutrient Needs:  Energy (kcal):  ; Weight Used for Energy Requirements:  Current     Protein (g):  70-80; Weight Used for Protein Requirements:  Ideal(1.3-1.5)        Fluid (ml/day):  ; Method Used for Fluid Requirements:  1 ml/kcal      Nutrition Related Findings:  disoriented x3, active BS, soft abd, PEG, +1 edema, fluids WDL      Wounds:  None       Current Nutrition Therapies:    DIET TUBE FEED CONTINUOUS/CYCLIC NPO; Other Tube Feeding (must specify product in comment) (fibersource); Continuous; 85; 85; 24; Exceptions are: Sips with Meds  Current Tube Feeding (TF) Orders:  · Feeding Route: PEG  · Formula:  Other (Comment)(Fibersource)  · Schedule: Continuous  · Current TF & Flush Orders Provides: PEG noted clamped at this time    Anthropometric Measures:  · Height: 5' 3\" (160 cm)  · Current Body Weight: 173 lb (78.5 kg)(12/28)   · Usual Body Weight: 172 lb (78 kg)(8/2020 bed scale, per EMR)     · Ideal Body Weight: 115 lbs; % Ideal Body Weight 150.4 %   · BMI: 30.7  · BMI Categories: Obese Class 1 (BMI 30.0-34. 9)       Nutrition Diagnosis:   · Inadequate oral intake related to cognitive or neurological impairment as evidenced by NPO or clear liquid status due to medical condition, nutrition support - enteral nutrition    Nutrition Interventions:   Food and/or Nutrient Delivery:  Continue NPO, Modify Tube Feeding  Nutrition Education/Counseling:  Education not indicated   Coordination of Nutrition Care:  Continue to monitor while inpatient    Goals:  tolerance to En at goal rate       Nutrition Monitoring and Evaluation:   Food/Nutrient Intake Outcomes:  Enteral Nutrition Intake/Tolerance  Physical Signs/Symptoms Outcomes:  Biochemical Data, GI Status, Fluid Status or Edema, Nutrition Focused Physical Findings, Skin, Weight     Discharge Planning:    Enteral Nutrition     Electronically signed by Mary Santamaria, MS, RD, LD on 12/29/20 at 1:51 PM EST    Contact: [phone]

## 2020-12-29 NOTE — ED NOTES
Assessment note- pt less alert now than earlier in shift, slightly difficult to arouse now.       Jennifer Zacarias RN  12/28/20 1942

## 2020-12-30 NOTE — PLAN OF CARE
Problem: Falls - Risk of:  Goal: Will remain free from falls  Description: Will remain free from falls  12/30/2020 1045 by Mason Boateng RN  Outcome: Met This Shift

## 2020-12-30 NOTE — CARE COORDINATION
Social Work Discharge/Planning:    Patient's PT score for today was 6/24 and OT for today was 8/24. SW attempted to meet with patient, patient is currently off the floor and located at [address]. Patient COVID NEGATIVE 12/28.     1601 SW spoke with liaison Tong Fregoso with [facility] who reports patient is able to return to [facility] once medically clear for discharge. NORMA/CARLI to follow.        Meera Calzada, AZUL  [phone]

## 2020-12-30 NOTE — PROGRESS NOTES
Physical Therapy  Physical Therapy Initial Assessment     Name: Eran Plata  : 1932  MRN: [id number]    Referring Provider:  Lurdes Nolasco DO    Date of Service: 2020    Evaluating PT:  Cristal Flood, PT, DPT. [id number]    Room #:  2535/0976-L  Diagnosis:  Acute cystitis   Reason for admission:  AMS   Precautions:  Falls  Procedures: none  Equipment Recommendations:  none    SUBJECTIVE:  Pt admitted from [facility]. Unable to provide any reliable hx. Dependent baseline. OBJECTIVE:   Initial Evaluation  Date:    AM-PAC 6 Clicks    Was pt agreeable to Eval/treatment? Yes    Does pt have pain? No report    Bed Mobility  Rolling: MaxA  Supine to sit: NT  Sit to supine: NT  Scooting: Dependent   Transfers Sit to stand: NT  Stand to sit: NT  Stand pivot: NT   Ambulation    NT     Stair negotiation: ascended and descended  NT   ROM BUE:  See OT eval   BLE:  WFL   Strength BUE:  See OT eval   BLE:  knee ext 1/5  Ankle DF 1/5   Balance Sitting EOB:  NT  Dynamic Standing:  NT     -Pt is A & O x 1 to name  -Sensation:  unremarkable   -Edema:  unremarkable     Therapeutic Exercises:  functional activity     Patient education  Pt educated on safety, sequencing of transfers, and role of PT    Patient response to education:   Pt verbalized understanding Pt demonstrated skill Pt requires further education in this area   No  No  No      ASSESSMENT:    Comments:  Pt received supine in bed and agreeable to PT session  Pt confused and speaking nonsensically. Attempted to reorient pt unsuccessfully. Pt not willing to attempt sitting EOB, states she is \"too scared to fall\". Pt willing to participate in bed mobility and did assist to roll by pulling on bedrail but still required near total assist to complete. Dependently scooted to top of bed. Pt dependent at baseline with no further PT needs. PROM and repositioning can be deferred to nursing.      Treatment:  Patient practiced and was instructed in the following treatment:     Patient education provided continuously throughout session for sequencing, safety maintenance, and improving any deficits found during the evaluation.  Bed mobility training - pt given verbal and tactile cues to facilitate proper sequencing and safety during rolling as well as provided with physical assistance to complete task      Pt's/ family goals   1. None stated     Patient and or family understand(s) diagnosis, prognosis, and plan of care. ? PLAN:    Pt dependent at baseline with no further PT needs. PROM and repositioning can be deferred to nursing. Evaluation Time includes thorough review of current medical information, gathering information on past medical history/social history and prior level of function, completion of standardized testing/informal observation of tasks, assessment of data and education on plan of care and goals.     CPT codes:  [x] Low Complexity PT evaluation 16620  [] Moderate Complexity PT evaluation 30133  [] High Complexity PT evaluation 35508  [] PT Re-evaluation 45458  [] Gait training 71651 - minutes  [] Manual therapy 86664 - minutes  [] Therapeutic activities 29318 - minutes  [] Therapeutic exercises 29125 - minutes  [] Neuromuscular reeducation 26674 - minutes     Lucia Hinojosa PT, DPT  [id number]

## 2020-12-30 NOTE — PROGRESS NOTES
Chief Complaint:  AMS    Subjective: The patient is alert. Confused. Unaware of illness. Denies chest pain & SOB . Denies abdominal pain. Tolerating diet. No nausea or vomiting. Objective:    Vitals:    12/30/20 0745   BP: 126/72   Pulse: 89   Resp: 18   Temp: 98 °F (36.7 °C)   SpO2: 92%     Awake and alert, confused  Heart:  RRR, no murmurs, gallops, or rubs. Lungs:  CTA bilaterally, no wheeze, rales or rhonchi  Abd: bowel sounds present, nontender, nondistended, no masses, PEG tube  Extrem:  No clubbing, cyanosis, or edema      Lab Results   Component Value Date     12/29/2020    K 3.0 12/29/2020    CL 95 12/29/2020    CO2 28 12/29/2020    BUN 52 12/29/2020    CREATININE 0.9 12/29/2020    CALCIUM 9.4 12/29/2020      Lab Results   Component Value Date    WBC 8.1 12/29/2020    RBC 3.21 12/29/2020    HGB 11.7 12/29/2020    HCT 34.4 12/29/2020    .2 12/29/2020    MCH 36.4 12/29/2020    MCHC 34.0 12/29/2020    RDW 14.7 12/29/2020     12/29/2020    MPV 10.2 12/29/2020     PT/INR:    Lab Results   Component Value Date    PROTIME 11.3 12/06/2013    INR 1.0 12/06/2013     No results for input(s): POCGLU in the last 72 hours. Recent Labs     12/28/20  1028 12/29/20  0615   * 135   K 3.3* 3.0*   CL 89* 95*   CO2 30* 28   BUN 51* 52*   LABALBU 4.0  --    CREATININE 0.9 0.9   CALCIUM 9.9 9.4   GFRAA >60 >60   LABGLOM 59 59   GLUCOSE 135* 109*       Ct Head Wo Contrast    Result Date: 12/28/2020  EXAMINATION: CT OF THE HEAD WITHOUT CONTRAST  12/28/2020 1:48 pm TECHNIQUE: CT of the head was performed without the administration of intravenous contrast. Dose modulation, iterative reconstruction, and/or weight based adjustment of the mA/kV was utilized to reduce the radiation dose to as low as reasonably achievable. COMPARISON: 08/18/2020 HISTORY: ORDERING SYSTEM PROVIDED HISTORY: AMS TECHNOLOGIST PROVIDED HISTORY: Reason for exam:->AMS Has a \"code stroke\" or \"stroke alert\" been called? ->No What reading provider will be dictating this exam?->CRC FINDINGS: SOME MOTION ARTIFACTS LIMIT EVALUATION. BRAIN/VENTRICLES: There is no acute intracranial hemorrhage, mass effect or midline shift. No abnormal extra-axial fluid collection. The gray-white differentiation is maintained without evidence of an acute infarct. There is no evidence of hydrocephalus. There is moderate generalized atrophy and mild chronic ischemic/degenerative changes in the white matter. ORBITS: The visualized portion of the orbits demonstrate no acute abnormality. SINUSES: The visualized paranasal sinuses and mastoid air cells demonstrate no acute abnormality. SOFT TISSUES/SKULL:  No acute abnormality of the visualized skull or soft tissues. No acute intracranial abnormality. Xr Chest Portable    Result Date: 12/28/2020  EXAMINATION: ONE XRAY VIEW OF THE CHEST 12/28/2020 10:33 am COMPARISON: 01/08/2017 HISTORY: ORDERING SYSTEM PROVIDED HISTORY: SOB, cough TECHNOLOGIST PROVIDED HISTORY: Reason for exam:->SOB, cough What reading provider will be dictating this exam?->CRC FINDINGS: The heart is enlarged. No findings of failure. There is mild atelectasis seen within the right perihilar region which was noted on the patient's previous CT scan and is unchanged. Patchy left perihilar infiltrates are noted. There is no pleural effusion. 1. Patchy left perihilar infiltrates. 2. Right perihilar atelectasis. Xr Abdomen For Ng/og/ne Tube Placement    Result Date: 12/28/2020  EXAMINATION: ONE SUPINE XRAY VIEW(S) OF THE ABDOMEN 12/28/2020 9:10 pm COMPARISON: None. HISTORY: ORDERING SYSTEM PROVIDED HISTORY: Peg placement TECHNOLOGIST PROVIDED HISTORY: Reason for exam:->Peg placement Portable? ->Yes What reading provider will be dictating this exam?->CRC FINDINGS: Percutaneous G tube tip projects over the left upper quadrant. Contrast was instilled with identification of gastric folds.   This indicates that the G tube tip is in the gastric lumen. No evidence for extravasation. Colonic fecal retention involving the left side of the colon. Scoliosis lumbar spine. Percutaneous G tube tip in the body of the stomach. Scheduled Meds:   enoxaparin  40 mg Subcutaneous Daily    ferrous sulfate  325 mg Oral BID    metoprolol succinate  50 mg Oral Daily    pantoprazole  40 mg Oral Daily    potassium chloride  20 mEq Oral Daily    risperiDONE  0.5 mg Oral BID    rivastigmine  1 patch Transdermal Daily    [START ON 1/3/2021] vitamin D  50,000 Units Oral Q14 Days    sodium chloride flush  10 mL Intravenous 2 times per day    cefTRIAXone (ROCEPHIN) IV  1 g Intravenous Q24H     Continuous Infusions:   sodium chloride 100 mL/hr at 12/30/20 0346     PRN Meds:.acetaminophen, polyethylene glycol, sodium chloride flush, promethazine **OR** ondansetron, bisacodyl, diatrizoate meglumine-sodium    No intake/output data recorded. No intake/output data recorded.   No intake or output data in the 24 hours ending 12/30/20 0904    Assessment:    Active Hospital Problems    Diagnosis    Dehydration [E86.0]     Priority: High    Acute cystitis without hematuria [N30.00]    Sepsis secondary to UTI ([facility] [address] [A41.9, N39.0]    Elevated lactic acid level [R79.89]    Delirium, acute [R41.0]    Anemia, macrocytic [D53.9]    Dementia ([facility] [address] [F03.90]       Plan:  Urine culture E coli > 100K              Patient alert and diet started po             Recheck BMP--low K+ corrected             Continue IVF             Increase acitivity    Shabnam Hancock DO  9:04 AM  12/30/2020

## 2020-12-30 NOTE — PROGRESS NOTES
OCCUPATIONAL THERAPY INITIAL EVALUATION      Date:2020  Patient Name: Shahram Oliver  MRN: [id number]  : 1932  Room: [address] [place] [address]    Referring Provider: Rodrigo Cooley DO    Evaluating OT: ROBERT CondeR/L; [id number]    AM-PAC Daily Activity Raw Score:     Recommended Adaptive Equipment: TBD     Diagnosis: acute cystitis    Pertinent Medical History: LETHA, HTN, PVD, GERD, bleeding ulcer      Precautions:  Falls, bed alarm, dementia, poor historian, feeding tube, contact isolation, garcia     Home Living: Pt lives at [facility]. Pt is a poor historian   Bathroom setup: unable to obtain, pt is a poor historian   Equipment owned: unable to obtain, pt is a poor historian    Prior Level of Function: assitance with ADLs , asssitance with IADLs; ambulated ? ? Leisure: likes to watch baseball     Pain Level: 5/10 pain behind R knee; repositioned at end of session and pt reported pain decreased  Cognition: A&O:  (person oriented to name. Stated it was \"august\" and that we were in the [facility] room\";  Follows 1 step directions with encouragement and repeated instruction   Memory:  Poor (pt unable to recall location two minutes after therapist provided pt with information)   Sequencing:  Poor (max verbal cues to wash face with wash cloth)   Problem solving:  poor   Judgement/safety:  poor     Functional Assessment:   Initial Eval Status  Date: 20 Treatment Status  Date: STGs = LTGs  Time frame: 5-7 days   Feeding Maximal assist  Simulated beverage management   Minimal Assist    Grooming Maximal Assist   Wash face seated in bed  Minimal Assist    UB Dressing Dependent   Fix gown seated in bed  Moderate Assist    LB Dressing Dependent   B socks  Maximal Assist    Bathing Dependent  Simulated seated in bed  Maximal Assist    Toileting Dependent   garcia  Maximal Assist    Bed Mobility  Supine to sit: NT   Sit to supine: NT  Rolling: Dependent, to reposition pt to increase comfort  Supine to sit: Moderate Assist   Sit to supine: Moderate Assist    Functional Transfers NT, d/t pt unable to assist therapist to sit EOB  Moderate Assist with AAD   Functional Mobility NT   Maximal Assist with AAD   Balance Sitting:     Static:  NT    Dynamic:NT  Standing:    Static: NT   Dynamic: NT  Sitting:     Static:  Minimal assist    Dynamic:minimal assist  Standing:   Static: moderate assist with AAD   Dynamic: moderate assist with AAD   Activity Tolerance Poor, d/t fatigue  SpO2 WFL on RA  Good with seated ADL activities   Visual/  Perceptual Glasses: no                Hand Dominance right   Strength ROM Additional Info:    RUE  3/5  Shoulder flexion: 90 AROM, 120 PROM  Elbow flexion/extension: WFL poor  and poor FMC/dexterity noted during ADL tasks       LUE 3/5  Shoulder flexion: 50 AROM, 90 PROM  Elbow flexion/extension: WFL poor  and poor FMC/dexterity noted during ADL tasks       Hearing: WFL   Sensation:  No c/o numbness or tingling   Tone: WFL   Edema: none noted    Comments: RN cleared for therapy. Upon arrival patient supine in bed with HOB elevated. Pt educated on role of occupational therapy and agreeable to OT session. At end of session, patient supine in bed with call light and phone within reach, all lines and tubes intact. Overall patient demonstrated decreased independence and safety during completion of ADL/functional transfer tasks. Pt would benefit from continued skilled OT to increase safety and independence with completion of ADL/IADL tasks for functional independence and quality of life. Treatment: OT treatment provided this date includes:    Instruction/training on safety and adapted techniques for completion of ADLs: pt provided maximal verbal cues to wash face with wash cloth while pt seated in bed.   Proper Positioning/Alignment: pt needed total assistance to straighten up in bed to increase comfort.      Assessment of current deficits  Functional mobility [x]  ADLs [x] Strength [x]  Cognition [x]  Functional transfers  [x] IADLs [x] Safety Awareness [x]  Endurance [x]  Fine Coordination [x] Balance [x] Vision/perception [] Sensation []   Gross Motor Coordination [] ROM [x] Delirium []                  Motor Control []     Plan of Care: 1-3 days/wk for 1-2 weeks PRN   Instruction/training on adapted ADL techniques and AE recommendations to increase functional independence  Training on energy conservation strategies/techniques to improve independence/tolerance for self-care routine  Functional transfer/mobility training/DME recommendations for increased independence, safety, and fall prevention  Patient/Family education to increase follow through with safety techniques and functional independence  Recommendation of environmental modifications for increased safety with functional transfers/mobility and ADLs  Cognitive retraining/development of therapeutic activities to improve problem solving, judgement, memory, and attention for increased safety/participation in ADL/IADL tasks  Therapeutic exercise to improve motor endurance, ROM, and functional strength for ADLs/functional transfers  Therapeutic activities to facilitate/challenge dynamic balance, stand tolerance, fine motor dexterity/in-hand manipulation for increased independence with ADLs    Rehab Potential: Good for established goals     Patient / Family Goal: not stated      Patient and/or family were instructed on functional diagnosis, prognosis/goals and OT plan of care. Demonstrated poor understanding. Eval Complexity: moderate    · moderate Complexity  · History: Expanded review of medical records and additional review of physical, cognitive, or psychosocial history related to current functional performance  · Exam: 5+ performance deficits  · Assistance/Modification: Min/mod assistance or modifications required to perform tasks. May have comorbidities that affect occupational performance.       Time In: 10:25  Time Out: 10:46  Total Treatment Time: 11 minutes    Min Units   OT Eval Low 04372       OT Eval Medium 47741  X    OT Eval High 06339       OT Re-Eval [id number]       Therapeutic Ex 50350       Therapeutic Activities 03423  11  1   ADL/Self Care 86952       Orthotic Management 51322       Neuro Re-Ed 97629       Non-Billable Time          Evaluation Time includes thorough review of current medical information, gathering information on past medical history/social history and prior level of function, completion of standardized testing/informal observation of tasks, assessment of data and education on plan of care and goals.         Alexandra Treviño, MOTR/L; [id number]

## 2020-12-31 NOTE — CARE COORDINATION
Care coordination - Noted the discharge order is in and the patient is set up to return to [facility] skilled today and is [facility] pm today . I told the patient and will notifiy the patients relative Batool Gaytan at [phone]. Return envelope done .  I will follow

## 2020-12-31 NOTE — DISCHARGE SUMMARY
Physician Discharge Summary     Patient ID:  Eliz Rodriguez  [id number]  65 y.o.  11/24/1932    Admit date: 12/28/2020    Discharge date and time: 12/31/2020     Admission Diagnoses: Acute cystitis without hematuria [N30.00]    Discharge Diagnoses: Active Hospital Problems    Diagnosis    Dehydration [E86.0]     Priority: High    Acute cystitis without hematuria [N30.00]    Sepsis secondary to UTI ([facility] [address] [A41.9, N39.0]    Elevated lactic acid level [R79.89]    Delirium, acute [R41.0]    Anemia, macrocytic [D53.9]    Dementia ([facility] [address] [F03.90]       Consults: none    Procedures:  none    Hospital Course: The patient is a 80 y.o. female patient of Dr. Kalia Justice  presents from a SNF with a change in mental status. The patient drowsy and confused when alert with history obtained from the medical records and d/w the ER physician. A feeding tube placed during her previous hospital stay d/t inadequate intake. Findings confirmed acute delirium d/t UTI sepsis. The patient improved and alert permitting oral diet and continuation of gastric tube feeding. Urine culture c/w E coli--ESBL. Increased free water provided on discharge with repeat BMP in 5 days recommended in f/u. The patient is discharged in improved and stable condition with OP f/u as directed.  Instructions to hold Risperdal if the patient appears too sedated.       CBC with Differential:    Lab Results   Component Value Date    WBC 8.1 12/29/2020    RBC 3.21 12/29/2020    HGB 11.7 12/29/2020    HCT 34.4 12/29/2020     12/29/2020    .2 12/29/2020    MCH 36.4 12/29/2020    MCHC 34.0 12/29/2020    RDW 14.7 12/29/2020    NRBC 0.9 01/08/2017    SEGSPCT 66 12/06/2013    LYMPHOPCT 30.6 12/29/2020    MONOPCT 5.9 12/29/2020    BASOPCT 0.5 12/29/2020    MONOSABS 0.48 12/29/2020    LYMPHSABS 2.49 12/29/2020    EOSABS 0.03 12/29/2020    BASOSABS 0.04 12/29/2020     CMP:    Lab Results   Component Value Date     12/31/2020    K 3.8 12/31/2020    K 3.0 12/29/2020     12/31/2020    CO2 17 12/31/2020    BUN 31 12/31/2020    CREATININE 0.8 12/31/2020    GFRAA >60 12/31/2020    LABGLOM >60 12/31/2020    GLUCOSE 137 12/31/2020    GLUCOSE 100 05/21/2012    PROT 7.2 12/28/2020    LABALBU 4.0 12/28/2020    LABALBU 4.2 05/21/2012    CALCIUM 8.5 12/31/2020    BILITOT 0.4 12/28/2020    ALKPHOS 98 12/28/2020    AST 17 12/28/2020    ALT 18 12/28/2020     U/A:    Lab Results   Component Value Date    COLORU Yellow 12/28/2020    PROTEINU Negative 12/28/2020    PHUR 7.5 12/28/2020    WBCUA >20 12/28/2020    RBCUA 1-3 12/28/2020    RBCUA 0-1 11/13/2013    BACTERIA MANY 12/28/2020    CLARITYU Clear 12/28/2020    SPECGRAV 1.010 12/28/2020    LEUKOCYTESUR MODERATE 12/28/2020    UROBILINOGEN 1.0 12/28/2020    BILIRUBINUR Negative 12/28/2020    BLOODU TRACE-INTACT 12/28/2020    GLUCOSEU Negative 12/28/2020       Disposition: [facility]    Patient Instructions:     Medication List      START taking these medications    cephALEXin 500 MG capsule  Commonly known as: KEFLEX  Take 1 capsule by mouth 2 times daily for 10 days        CONTINUE taking these medications    acetaminophen 325 MG tablet  Commonly known as: TYLENOL     ferrous sulfate 325 (65 Fe) MG tablet  Commonly known as: IRON 325     metoprolol succinate 50 MG extended release tablet  Commonly known as: TOPROL XL     mineral oil-hydrophilic petrolatum ointment  Apply topically as needed.      omeprazole 20 MG delayed release capsule  Commonly known as: PRILOSEC     polyethylene glycol 17 g packet  Commonly known as: GLYCOLAX     potassium chloride 20 MEQ extended release tablet  Commonly known as: KLOR-CON M     risperiDONE 0.5 MG tablet  Commonly known as: RISPERDAL     rivastigmine 4.6 MG/24HR  Commonly known as: EXELON     vitamin D 1.25 MG (94908 UT) Caps capsule  Commonly known as: ERGOCALCIFEROL        STOP taking these medications    Cranberry 500 MG Tabs     diphenhydrAMINE 25 MG tablet  Commonly known as: Benadryl sennosides-docusate sodium 8.6-50 MG tablet  Commonly known as: SENOKOT-S           Where to Get Your Medications      Information about where to get these medications is not yet available    Ask your nurse or doctor about these medications  · cephALEXin 500 MG capsule  · mineral oil-hydrophilic petrolatum ointment          Activity: activity as tolerated   Diet: gastric and PO    Follow-up with Dr. Preston Lane in SNF  Note that over 30 minutes was spent in preparing discharge papers, discussing discharge with patient, medication review, etc.    Signed:  Kaz Elder DO  12/31/2020  10:17 AM

## 2020-12-31 NOTE — DISCHARGE INSTR - COC
Continuity of Care Form    Patient Name: Eddie Ernst   :  1932  MRN:  [id number]    Admit date:  2020  Discharge date:  20      Code Status Order: DNR-CCA   Advance Directives:   [facility] Documentation       Date/Time Healthcare Directive Type of Healthcare Directive Copy in [address] Agent's Name Healthcare Agent's Phone Number    20  Yes, patient has an advance directive for healthcare treatment  Health care treatment directive;Durable power of  for health care;Living will  Yes, copy in chart  Adult siblings  [employer]  no phone number listed            Admitting Physician:  Chandrakant Alaniz DO  PCP: Taye Malhotra DO    Discharging Nurse: britt crawford [facility] Unit/Room#: 1330/1417-Q  Discharging Unit Phone Number: [phone]      Emergency Contact:   Extended Emergency Contact Information  Primary Emergency Contact: [address] [facility],  [address] Phone: [phone]  Relation: Other    Past Surgical History:  Past Surgical History:   Procedure Laterality Date    ENDOSCOPY, COLON, DIAGNOSTIC  colonoscopy    FRACTURE SURGERY Right 2013    ORIF RIGHT FEMUR    HYSTERECTOMY  early     TONSILLECTOMY  as a child    UPPER GASTROINTESTINAL ENDOSCOPY N/A 2020    EGD PEG TUBE INSERTION performed by Malcolm Montano MD at [address] [facility]  2020    EGD BIOPSY performed by Malcolm Montano MD at [facility] Right as a child       Immunization History:   Immunization History   Administered Date(s) Administered    Influenza Virus Vaccine 2013       Active Problems:  Patient Active Problem List   Diagnosis Code    GI bleed K92.2    Duodenal ulcer K26.9    Hypertension I10    Dehydration E86.0    Osteoarthritis M19.90    Anemia due to blood loss     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 ([facility] [address] F02.80    Closed displaced supracondylar fracture with intracondylar extension of lower end of right femur with malunion S72.461P    Osteoarthritis of right knee M17.11    LETHA (acute kidney injury) ([facility] [address] N17.9    PVD (peripheral vascular disease) ([facility]) I73.9    GERD (gastroesophageal reflux disease) K21.9    History of bleeding ulcers A06.05    Metabolic acidosis T06.5    Hyperuricemia E79.0    Acute cystitis without hematuria N30.00    Sepsis secondary to UTI ([facility]) A41.9, N39.0    Elevated lactic acid level R79.89    Delirium, acute R41.0    Anemia, macrocytic D53.9    Dementia ([facility]) F03.90       Isolation/Infection:   Isolation            Contact          Patient Infection Status       Infection Onset Added Last Indicated Last Indicated By Review Planned Expiration Resolved Resolved By    ESBL (Extended Spectrum Beta Lactamase) 05/22/20 05/24/20 07/16/20 Culture, Urine        E Coli Urine 5/22/20, 7/16/20    Resolved    COVID-19 Rule Out 12/28/20 12/28/20 12/28/20 COVID-19 (Ordered)   12/28/20 Rule-Out Test Resulted    COVID-19 Rule Out 10/30/20 10/30/20 10/30/20 Covid-19 Ambulatory (Ordered)   11/01/20 Rule-Out Test Resulted    COVID-19 Rule Out 10/27/20 10/28/20 10/27/20 Covid-19 Ambulatory (Ordered)   10/30/20 Rule-Out Test Resulted    COVID-19 Rule Out 09/14/20 09/14/20 09/14/20 Covid-19 Ambulatory (Ordered)   09/16/20 Rule-Out Test Resulted    COVID-19 Rule Out 09/08/20 09/08/20 09/08/20 Covid-19 Ambulatory (Ordered)   09/09/20 Rule-Out Test Resulted    COVID-19 Rule Out 09/01/20 09/02/20 09/01/20 Covid-19 Ambulatory (Ordered)   09/03/20 Rule-Out Test Resulted    COVID-19 Rule Out 08/20/20 08/20/20 08/20/20 Covid-19 Ambulatory (Ordered)   08/21/20 Rule-Out Test Resulted    COVID-19 Rule Out 06/16/20 06/16/20 06/16/20 Covid-19 Ambulatory (Ordered)   06/19/20 Rule-Out Test Resulted    COVID-19 Rule Out 06/12/20 06/12/20 06/12/20 Covid-19 Ambulatory (Ordered)   06/16/20 Rule-Out Test Resulted    COVID-19 05/26/20 05/28/20 05/28/20 Covid-19 Ambulatory   20     COVID-19 Rule Out 20 Covid-19 Ambulatory (Ordered)   20 Rule-Out Test Resulted            Nurse Assessment:  Last Vital Signs: /60   Pulse 76   Temp 98.6 °F (37 °C) (Temporal)   Resp 16   Ht 5' 3\" (1.6 m)   Wt 173 lb (78.5 kg)   SpO2 95%   BMI 30.65 kg/m²     Last documented pain score (0-10 scale): Pain Level: 0  Last Weight:   Wt Readings from Last 1 Encounters:   20 173 lb (78.5 kg)     Mental Status:  disoriented, alert and able to concentrate and follow conversation    IV Access:  - None    Nursing Mobility/ADLs:  Walking   Dependent  Transfer  Dependent     Bathing  Dependent  Dressing  Dependent  Toileting  Dependent  Feeding  Assisted  Med Admin  Dependent  Med Delivery   whole    Wound Care Documentation and Therapy:  Incision 13 Leg Upper; Lower;Right (Active)   Number of days: 8105        Elimination:  Continence:   · Bowel: No  · Bladder: No  Urinary Catheter: None   Colostomy/Ileostomy/Ileal Conduit: No       Date of Last BM: 20      Intake/Output Summary (Last 24 hours) at 2020 1007  Last data filed at 2020 4111  Gross per 24 hour   Intake 3732 ml   Output --   Net 3732 ml     I/O last 3 completed shifts: In: 1523 [I.V.:1652; NG/GT:2080]  Out: -     Safety Concerns: At Risk for Falls    Impairments/Disabilities:      None    Nutrition Therapy:  Current Nutrition Therapy:   - Oral Diet:  Dental Soft  - Tube Feedings:  Standard with fiber 55cc/hr with water flush 100 cc every 6 hours      Routes of Feeding: Oral and Gastrostomy Tube  Liquids: No Restrictions  Daily Fluid Restriction: no  Last Modified Barium Swallow with Video (Video Swallowing Test): not done    Treatments at the Time of Hospital Discharge:   Respiratory Treatments: see med rec    Oxygen Therapy:  is not on home oxygen therapy.   Ventilator:    - No ventilator support    Rehab Therapies: {THERAPEUTIC INTERVENTION:0403925243}  Weight Bearing Status/Restrictions: 50Vy LIMON Weight Bearin:::0}  Other Medical Equipment (for information only, NOT a DME order):  {EQUIPMENT:960548174}  Other Treatments: ***    Patient's personal belongings (please select all that are sent with patient):  None    RN SIGNATURE:  Electronically signed by Jo Ann Coon RN on 20 at 11:03 AM EST    CASE MANAGEMENT/SOCIAL WORK SECTION    Inpatient Status Date: ***    Readmission Risk Assessment Score:  Readmission Risk              Risk of Unplanned Readmission:        28           Discharging to Facility/ Agency   · Name:   · Address:  · Phone:  · Fax:    Dialysis Facility (if applicable)   · Name:  · Address:  · Dialysis Schedule:  · Phone:  · Fax:    / signature: {Esignature:[id number]:::0}    PHYSICIAN SECTION    Prognosis: {Prognosis:9753280735:::0}    Condition at Discharge: [place] Nagy Patient Condition:944455919:::0}    Rehab Potential (if transferring to Rehab): {Prognosis:5882617886:::0}    Recommended Labs or Other Treatments After Discharge: ***    Physician Certification: I certify the above information and transfer of Purvi Caro  is necessary for the continuing treatment of the diagnosis listed and that she requires {Admit to Appropriate Level of Care:35310:::0} for {GREATER/LESS:315548104} 30 days.      Update Admission H&P: {CHP DME Changes in HandP:557640364:::0}    PHYSICIAN SIGNATURE:  {Esignature:758476799:::0}

## 2020-12-31 NOTE — PLAN OF CARE
Problem: Skin Integrity:  Goal: Will show no infection signs and symptoms  Description: Will show no infection signs and symptoms  12/31/2020 1055 by Jesica Jasmine RN  Outcome: Completed  12/31/2020 1039 by Jesica Jasmine RN  Outcome: Met This Shift  Goal: Absence of new skin breakdown  Description: Absence of new skin breakdown  12/31/2020 1055 by Jesica Jasmine RN  Outcome: Completed  12/31/2020 1039 by Jesica Jasmine RN  Outcome: Met This Shift  12/31/2020 0116 by Maranda Del Rosario RN  Outcome: Met This Shift     Problem: Falls - Risk of:  Goal: Will remain free from falls  Description: Will remain free from falls  12/31/2020 1055 by Jesica Jasmine RN  Outcome: Completed  12/31/2020 1039 by Jesica Jasmine RN  Outcome: Met This Shift  12/31/2020 0116 by Maranda Del Rosario RN  Outcome: Met This Shift  Goal: Absence of physical injury  Description: Absence of physical injury  12/31/2020 1055 by Jesica Jasmine RN  Outcome: Completed  12/31/2020 1039 by Jesica Jasmine RN  Outcome: Met This Shift

## 2020-12-31 NOTE — PLAN OF CARE
Problem: Skin Integrity:  Goal: Will show no infection signs and symptoms  Description: Will show no infection signs and symptoms  Outcome: Met This Shift  Goal: Absence of new skin breakdown  Description: Absence of new skin breakdown  12/31/2020 1039 by Jocelyn Pike RN  Outcome: Met This Shift  12/31/2020 0116 by Gavin Stovall RN  Outcome: Met This Shift     Problem: Falls - Risk of:  Goal: Will remain free from falls  Description: Will remain free from falls  12/31/2020 1039 by Jocelyn Pike RN  Outcome: Met This Shift  12/31/2020 0116 by Gavin Stovall RN  Outcome: Met This Shift  Goal: Absence of physical injury  Description: Absence of physical injury  Outcome: Met This Shift

## 2020-12-31 NOTE — PROGRESS NOTES
Chief Complaint:  AMS    Subjective: The patient is alert. Still confused. Unaware of illness. Denies chest pain & SOB . Denies abdominal pain. Tolerating diet. No nausea or vomiting. Objective:    Vitals:    12/31/20 0400   BP: 114/65   Pulse: 68   Resp: 16   Temp: 98.3 °F (36.8 °C)   SpO2: 96%     Awake and alert, confused  Heart:  RRR, no murmurs, gallops, or rubs. Lungs:  CTA bilaterally, no wheeze, rales or rhonchi  Abd: bowel sounds present, nontender, nondistended, no masses, PEG tube  Extrem:  No clubbing, cyanosis, or edema      Lab Results   Component Value Date     12/30/2020    K 3.8 12/30/2020    K 3.0 12/29/2020     12/30/2020    CO2 25 12/30/2020    BUN 40 12/30/2020    CREATININE 0.8 12/30/2020    CALCIUM 8.9 12/30/2020      Lab Results   Component Value Date    WBC 8.1 12/29/2020    RBC 3.21 12/29/2020    HGB 11.7 12/29/2020    HCT 34.4 12/29/2020    .2 12/29/2020    MCH 36.4 12/29/2020    MCHC 34.0 12/29/2020    RDW 14.7 12/29/2020     12/29/2020    MPV 10.2 12/29/2020     PT/INR:    Lab Results   Component Value Date    PROTIME 11.3 12/06/2013    INR 1.0 12/06/2013     No results for input(s): POCGLU in the last 72 hours. Recent Labs     12/28/20  1028 12/29/20  0615 12/30/20  1036   * 135 138   K 3.3* 3.0* 3.8   CL 89* 95* 103   CO2 30* 28 25   BUN 51* 52* 40*   LABALBU 4.0  --   --    CREATININE 0.9 0.9 0.8   CALCIUM 9.9 9.4 8.9   GFRAA >60 >60 >60   LABGLOM 59 59 >60   GLUCOSE 135* 109* 137*       Ct Head Wo Contrast    Result Date: 12/28/2020  EXAMINATION: CT OF THE HEAD WITHOUT CONTRAST  12/28/2020 1:48 pm TECHNIQUE: CT of the head was performed without the administration of intravenous contrast. Dose modulation, iterative reconstruction, and/or weight based adjustment of the mA/kV was utilized to reduce the radiation dose to as low as reasonably achievable.  COMPARISON: 08/18/2020 HISTORY: ORDERING SYSTEM PROVIDED HISTORY: [facility] TECHNOLOGIST PROVIDED HISTORY: Reason for exam:->AMS Has a \"code stroke\" or \"stroke alert\" been called? ->No What reading provider will be dictating this exam?->CRC FINDINGS: SOME MOTION ARTIFACTS LIMIT EVALUATION. BRAIN/VENTRICLES: There is no acute intracranial hemorrhage, mass effect or midline shift. No abnormal extra-axial fluid collection. The gray-white differentiation is maintained without evidence of an acute infarct. There is no evidence of hydrocephalus. There is moderate generalized atrophy and mild chronic ischemic/degenerative changes in the white matter. ORBITS: The visualized portion of the orbits demonstrate no acute abnormality. SINUSES: The visualized paranasal sinuses and mastoid air cells demonstrate no acute abnormality. SOFT TISSUES/SKULL:  No acute abnormality of the visualized skull or soft tissues. No acute intracranial abnormality. Xr Chest Portable    Result Date: 12/28/2020  EXAMINATION: ONE XRAY VIEW OF THE CHEST 12/28/2020 10:33 am COMPARISON: 01/08/2017 HISTORY: ORDERING SYSTEM PROVIDED HISTORY: SOB, cough TECHNOLOGIST PROVIDED HISTORY: Reason for exam:->SOB, cough What reading provider will be dictating this exam?->CRC FINDINGS: The heart is enlarged. No findings of failure. There is mild atelectasis seen within the right perihilar region which was noted on the patient's previous CT scan and is unchanged. Patchy left perihilar infiltrates are noted. There is no pleural effusion. 1. Patchy left perihilar infiltrates. 2. Right perihilar atelectasis. Xr Abdomen For Ng/og/ne Tube Placement    Result Date: 12/28/2020  EXAMINATION: ONE SUPINE XRAY VIEW(S) OF THE ABDOMEN 12/28/2020 9:10 pm COMPARISON: None. HISTORY: ORDERING SYSTEM PROVIDED HISTORY: Peg placement TECHNOLOGIST PROVIDED HISTORY: Reason for exam:->Peg placement Portable? ->Yes What reading provider will be dictating this exam?->CRC FINDINGS: Percutaneous G tube tip projects over the left upper quadrant.   Contrast was instilled with identification of gastric folds. This indicates that the G tube tip is in the gastric lumen. No evidence for extravasation. Colonic fecal retention involving the left side of the colon. Scoliosis lumbar spine. Percutaneous G tube tip in the body of the stomach. Scheduled Meds:   enoxaparin  40 mg Subcutaneous Daily    ferrous sulfate  325 mg Oral BID    metoprolol succinate  50 mg Oral Daily    pantoprazole  40 mg Oral Daily    risperiDONE  0.5 mg Oral BID    rivastigmine  1 patch Transdermal Daily    [START ON 1/3/2021] vitamin D  50,000 Units Oral Q14 Days    sodium chloride flush  10 mL Intravenous 2 times per day    cefTRIAXone (ROCEPHIN) IV  1 g Intravenous Q24H     Continuous Infusions:   sodium chloride 75 mL/hr at 12/30/20 1607     PRN Meds:.acetaminophen, polyethylene glycol, sodium chloride flush, promethazine **OR** ondansetron, bisacodyl, diatrizoate meglumine-sodium    I/O last 3 completed shifts: In: 9061 [I.V.:1652; NG/GT:2080]  Out: -   No intake/output data recorded.     Intake/Output Summary (Last 24 hours) at 12/31/2020 0737  Last data filed at 12/31/2020 2141  Gross per 24 hour   Intake 3732 ml   Output --   Net 3732 ml       Assessment:    Active Hospital Problems    Diagnosis    Dehydration [E86.0]     Priority: High    Acute cystitis without hematuria [N30.00]    Sepsis secondary to UTI ([facility] [address] [A41.9, N39.0]    Elevated lactic acid level [R79.89]    Delirium, acute [R41.0]    Anemia, macrocytic [D53.9]    Dementia ([address] [F03.90]       Plan:  Urine culture E coli > 100K-- ESBL              Patient alert and diet started po             Recheck BMP--low K+ corrected               POC BS and check HgbA1c             Discharge today    Olivia Darby DO  7:37 AM  12/31/2020

## 2021-01-01 ENCOUNTER — APPOINTMENT (OUTPATIENT)
Dept: GENERAL RADIOLOGY | Age: 86
DRG: 987 | End: 2021-01-01
Payer: MEDICARE

## 2021-01-01 ENCOUNTER — APPOINTMENT (OUTPATIENT)
Dept: CT IMAGING | Age: 86
DRG: 871 | End: 2021-01-01
Payer: MEDICARE

## 2021-01-01 ENCOUNTER — HOSPITAL ENCOUNTER (INPATIENT)
Age: 86
LOS: 5 days | Discharge: NURSING, REHAB OR LONG-TERM CARE | DRG: 871 | End: 2021-05-20
Attending: EMERGENCY MEDICINE | Admitting: INTERNAL MEDICINE
Payer: MEDICARE

## 2021-01-01 ENCOUNTER — APPOINTMENT (OUTPATIENT)
Dept: MRI IMAGING | Age: 86
DRG: 871 | End: 2021-01-01
Payer: MEDICARE

## 2021-01-01 ENCOUNTER — APPOINTMENT (OUTPATIENT)
Dept: CT IMAGING | Age: 86
DRG: 987 | End: 2021-01-01
Payer: MEDICARE

## 2021-01-01 ENCOUNTER — APPOINTMENT (OUTPATIENT)
Dept: ULTRASOUND IMAGING | Age: 86
DRG: 871 | End: 2021-01-01
Payer: MEDICARE

## 2021-01-01 ENCOUNTER — APPOINTMENT (OUTPATIENT)
Dept: GENERAL RADIOLOGY | Age: 86
DRG: 871 | End: 2021-01-01
Payer: MEDICARE

## 2021-01-01 ENCOUNTER — HOSPITAL ENCOUNTER (INPATIENT)
Age: 86
LOS: 6 days | Discharge: NURSING, REHAB OR LONG-TERM CARE | DRG: 987 | End: 2021-03-05
Attending: EMERGENCY MEDICINE | Admitting: HOSPITALIST
Payer: MEDICARE

## 2021-01-01 VITALS
TEMPERATURE: 96.4 F | RESPIRATION RATE: 16 BRPM | BODY MASS INDEX: 29.95 KG/M2 | SYSTOLIC BLOOD PRESSURE: 114 MMHG | DIASTOLIC BLOOD PRESSURE: 55 MMHG | HEIGHT: 63 IN | WEIGHT: 169 LBS | HEART RATE: 81 BPM | OXYGEN SATURATION: 96 %

## 2021-01-01 VITALS
WEIGHT: 206.8 LBS | RESPIRATION RATE: 28 BRPM | SYSTOLIC BLOOD PRESSURE: 109 MMHG | BODY MASS INDEX: 33.23 KG/M2 | TEMPERATURE: 99.6 F | OXYGEN SATURATION: 96 % | HEIGHT: 66 IN | HEART RATE: 104 BPM | DIASTOLIC BLOOD PRESSURE: 55 MMHG

## 2021-01-01 DIAGNOSIS — N39.0 URINARY TRACT INFECTION WITHOUT HEMATURIA, SITE UNSPECIFIED: Primary | ICD-10-CM

## 2021-01-01 DIAGNOSIS — R41.82 ALTERED MENTAL STATUS, UNSPECIFIED ALTERED MENTAL STATUS TYPE: ICD-10-CM

## 2021-01-01 DIAGNOSIS — K62.89 PROCTITIS: ICD-10-CM

## 2021-01-01 DIAGNOSIS — R41.82 ALTERED MENTAL STATUS, UNSPECIFIED ALTERED MENTAL STATUS TYPE: Primary | ICD-10-CM

## 2021-01-01 LAB
ACETAMINOPHEN LEVEL: <5 MCG/ML (ref 10–30)
ACINETOBACTER BAUMANNII BY PCR: NOT DETECTED
ALBUMIN SERPL-MCNC: 2.7 G/DL (ref 3.5–5.2)
ALBUMIN SERPL-MCNC: 2.9 G/DL (ref 3.5–5.2)
ALBUMIN SERPL-MCNC: 3 G/DL (ref 3.5–5.2)
ALBUMIN SERPL-MCNC: 3.1 G/DL (ref 3.5–5.2)
ALBUMIN SERPL-MCNC: 3.1 G/DL (ref 3.5–5.2)
ALBUMIN SERPL-MCNC: 3.2 G/DL (ref 3.5–5.2)
ALBUMIN SERPL-MCNC: 3.2 G/DL (ref 3.5–5.2)
ALBUMIN SERPL-MCNC: 3.3 G/DL (ref 3.5–5.2)
ALBUMIN SERPL-MCNC: 3.4 G/DL (ref 3.5–5.2)
ALP BLD-CCNC: 104 U/L (ref 35–104)
ALP BLD-CCNC: 111 U/L (ref 35–104)
ALP BLD-CCNC: 115 U/L (ref 35–104)
ALP BLD-CCNC: 136 U/L (ref 35–104)
ALP BLD-CCNC: 140 U/L (ref 35–104)
ALP BLD-CCNC: 85 U/L (ref 35–104)
ALP BLD-CCNC: 85 U/L (ref 35–104)
ALP BLD-CCNC: 98 U/L (ref 35–104)
ALP BLD-CCNC: 98 U/L (ref 35–104)
ALT SERPL-CCNC: 10 U/L (ref 0–32)
ALT SERPL-CCNC: 14 U/L (ref 0–32)
ALT SERPL-CCNC: 17 U/L (ref 0–32)
ALT SERPL-CCNC: 17 U/L (ref 0–32)
ALT SERPL-CCNC: 21 U/L (ref 0–32)
ALT SERPL-CCNC: 24 U/L (ref 0–32)
ALT SERPL-CCNC: 25 U/L (ref 0–32)
ALT SERPL-CCNC: 35 U/L (ref 0–32)
ALT SERPL-CCNC: 45 U/L (ref 0–32)
AMMONIA: 20 UMOL/L (ref 11–51)
AMPHETAMINE SCREEN, URINE: NOT DETECTED
ANAEROBIC CULTURE: ABNORMAL
ANION GAP SERPL CALCULATED.3IONS-SCNC: 10 MMOL/L (ref 7–16)
ANION GAP SERPL CALCULATED.3IONS-SCNC: 11 MMOL/L (ref 7–16)
ANION GAP SERPL CALCULATED.3IONS-SCNC: 12 MMOL/L (ref 7–16)
ANION GAP SERPL CALCULATED.3IONS-SCNC: 12 MMOL/L (ref 7–16)
ANION GAP SERPL CALCULATED.3IONS-SCNC: 13 MMOL/L (ref 7–16)
ANION GAP SERPL CALCULATED.3IONS-SCNC: 15 MMOL/L (ref 7–16)
ANION GAP SERPL CALCULATED.3IONS-SCNC: 7 MMOL/L (ref 7–16)
ANION GAP SERPL CALCULATED.3IONS-SCNC: 8 MMOL/L (ref 7–16)
ANION GAP SERPL CALCULATED.3IONS-SCNC: 8 MMOL/L (ref 7–16)
ANION GAP SERPL CALCULATED.3IONS-SCNC: 9 MMOL/L (ref 7–16)
ANISOCYTOSIS: ABNORMAL
AST SERPL-CCNC: 18 U/L (ref 0–31)
AST SERPL-CCNC: 20 U/L (ref 0–31)
AST SERPL-CCNC: 20 U/L (ref 0–31)
AST SERPL-CCNC: 22 U/L (ref 0–31)
AST SERPL-CCNC: 23 U/L (ref 0–31)
AST SERPL-CCNC: 24 U/L (ref 0–31)
AST SERPL-CCNC: 26 U/L (ref 0–31)
AST SERPL-CCNC: 31 U/L (ref 0–31)
AST SERPL-CCNC: 41 U/L (ref 0–31)
B.E.: -0.1 MMOL/L (ref -3–3)
B.E.: -2 MMOL/L (ref -3–3)
B.E.: 4.9 MMOL/L (ref -3–3)
BACTERIA: ABNORMAL /HPF
BACTERIA: ABNORMAL /HPF
BARBITURATE SCREEN URINE: NOT DETECTED
BASOPHILS ABSOLUTE: 0 E9/L (ref 0–0.2)
BASOPHILS ABSOLUTE: 0.01 E9/L (ref 0–0.2)
BASOPHILS ABSOLUTE: 0.03 E9/L (ref 0–0.2)
BASOPHILS RELATIVE PERCENT: 0.1 % (ref 0–2)
BASOPHILS RELATIVE PERCENT: 0.2 % (ref 0–2)
BASOPHILS RELATIVE PERCENT: 0.3 % (ref 0–2)
BASOPHILS RELATIVE PERCENT: 0.4 % (ref 0–2)
BASOPHILS RELATIVE PERCENT: 0.5 % (ref 0–2)
BENZODIAZEPINE SCREEN, URINE: NOT DETECTED
BILIRUB SERPL-MCNC: 0.2 MG/DL (ref 0–1.2)
BILIRUB SERPL-MCNC: 0.3 MG/DL (ref 0–1.2)
BILIRUB SERPL-MCNC: 0.4 MG/DL (ref 0–1.2)
BILIRUB SERPL-MCNC: <0.2 MG/DL (ref 0–1.2)
BILIRUBIN URINE: NEGATIVE
BILIRUBIN URINE: NEGATIVE
BLOOD CULTURE, ROUTINE: ABNORMAL
BLOOD CULTURE, ROUTINE: NORMAL
BLOOD, URINE: ABNORMAL
BLOOD, URINE: ABNORMAL
BOTTLE TYPE: ABNORMAL
BUN BLDV-MCNC: 22 MG/DL (ref 8–23)
BUN BLDV-MCNC: 23 MG/DL (ref 8–23)
BUN BLDV-MCNC: 24 MG/DL (ref 8–23)
BUN BLDV-MCNC: 26 MG/DL (ref 8–23)
BUN BLDV-MCNC: 27 MG/DL (ref 6–23)
BUN BLDV-MCNC: 30 MG/DL (ref 6–23)
BUN BLDV-MCNC: 31 MG/DL (ref 6–23)
BUN BLDV-MCNC: 36 MG/DL (ref 6–23)
BUN BLDV-MCNC: 36 MG/DL (ref 8–23)
BUN BLDV-MCNC: 39 MG/DL (ref 6–23)
BUN BLDV-MCNC: 39 MG/DL (ref 6–23)
BUN BLDV-MCNC: 42 MG/DL (ref 8–23)
BUN BLDV-MCNC: 42 MG/DL (ref 8–23)
BUN BLDV-MCNC: 47 MG/DL (ref 8–23)
BUN BLDV-MCNC: 48 MG/DL (ref 6–23)
BUN BLDV-MCNC: 55 MG/DL (ref 6–23)
CALCIUM SERPL-MCNC: 8.4 MG/DL (ref 8.6–10.2)
CALCIUM SERPL-MCNC: 8.6 MG/DL (ref 8.6–10.2)
CALCIUM SERPL-MCNC: 8.8 MG/DL (ref 8.6–10.2)
CALCIUM SERPL-MCNC: 8.9 MG/DL (ref 8.6–10.2)
CALCIUM SERPL-MCNC: 9 MG/DL (ref 8.6–10.2)
CALCIUM SERPL-MCNC: 9.1 MG/DL (ref 8.6–10.2)
CALCIUM SERPL-MCNC: 9.2 MG/DL (ref 8.6–10.2)
CALCIUM SERPL-MCNC: 9.3 MG/DL (ref 8.6–10.2)
CALCIUM SERPL-MCNC: 9.3 MG/DL (ref 8.6–10.2)
CALCIUM SERPL-MCNC: 9.5 MG/DL (ref 8.6–10.2)
CALCIUM SERPL-MCNC: 9.6 MG/DL (ref 8.6–10.2)
CALCIUM SERPL-MCNC: 9.6 MG/DL (ref 8.6–10.2)
CANDIDA ALBICANS BY PCR: NOT DETECTED
CANDIDA GLABRATA BY PCR: NOT DETECTED
CANDIDA KRUSEI BY PCR: NOT DETECTED
CANDIDA PARAPSILOSIS BY PCR: NOT DETECTED
CANDIDA TROPICALIS BY PCR: NOT DETECTED
CANNABINOID SCREEN URINE: NOT DETECTED
CHLORIDE BLD-SCNC: 100 MMOL/L (ref 98–107)
CHLORIDE BLD-SCNC: 104 MMOL/L (ref 98–107)
CHLORIDE BLD-SCNC: 106 MMOL/L (ref 98–107)
CHLORIDE BLD-SCNC: 108 MMOL/L (ref 98–107)
CHLORIDE BLD-SCNC: 109 MMOL/L (ref 98–107)
CHLORIDE BLD-SCNC: 111 MMOL/L (ref 98–107)
CHLORIDE BLD-SCNC: 112 MMOL/L (ref 98–107)
CHLORIDE BLD-SCNC: 114 MMOL/L (ref 98–107)
CHLORIDE BLD-SCNC: 94 MMOL/L (ref 98–107)
CHLORIDE BLD-SCNC: 96 MMOL/L (ref 98–107)
CHLORIDE BLD-SCNC: 97 MMOL/L (ref 98–107)
CHLORIDE BLD-SCNC: 99 MMOL/L (ref 98–107)
CHOLESTEROL, TOTAL: 145 MG/DL (ref 0–199)
CHP ED QC CHECK: NORMAL
CLARITY: ABNORMAL
CLARITY: CLEAR
CO2: 19 MMOL/L (ref 22–29)
CO2: 20 MMOL/L (ref 22–29)
CO2: 21 MMOL/L (ref 22–29)
CO2: 21 MMOL/L (ref 22–29)
CO2: 22 MMOL/L (ref 22–29)
CO2: 23 MMOL/L (ref 22–29)
CO2: 23 MMOL/L (ref 22–29)
CO2: 25 MMOL/L (ref 22–29)
CO2: 30 MMOL/L (ref 22–29)
CO2: 31 MMOL/L (ref 22–29)
CO2: 33 MMOL/L (ref 22–29)
COCAINE METABOLITE SCREEN URINE: NOT DETECTED
COHB: 0.4 % (ref 0–1.5)
COHB: 0.8 % (ref 0–1.5)
COHB: 0.8 % (ref 0–1.5)
COLOR: YELLOW
COLOR: YELLOW
CORTISOL TOTAL: 16.6 MCG/DL (ref 2.68–18.4)
CREAT SERPL-MCNC: 0.6 MG/DL (ref 0.5–1)
CREAT SERPL-MCNC: 0.6 MG/DL (ref 0.5–1)
CREAT SERPL-MCNC: 0.7 MG/DL (ref 0.5–1)
CREAT SERPL-MCNC: 0.8 MG/DL (ref 0.5–1)
CREAT SERPL-MCNC: 0.9 MG/DL (ref 0.5–1)
CREAT SERPL-MCNC: 1 MG/DL (ref 0.5–1)
CRITICAL: ABNORMAL
CULTURE, BLOOD 2: ABNORMAL
CULTURE, BLOOD 2: NORMAL
DATE ANALYZED: ABNORMAL
DATE OF COLLECTION: ABNORMAL
EKG ATRIAL RATE: 394 BPM
EKG ATRIAL RATE: 70 BPM
EKG P AXIS: 34 DEGREES
EKG P-R INTERVAL: 154 MS
EKG Q-T INTERVAL: 330 MS
EKG Q-T INTERVAL: 412 MS
EKG QRS DURATION: 74 MS
EKG QRS DURATION: 84 MS
EKG QTC CALCULATION (BAZETT): 425 MS
EKG QTC CALCULATION (BAZETT): 444 MS
EKG R AXIS: -10 DEGREES
EKG R AXIS: -5 DEGREES
EKG T AXIS: -18 DEGREES
EKG T AXIS: 7 DEGREES
EKG VENTRICULAR RATE: 100 BPM
EKG VENTRICULAR RATE: 70 BPM
ENTEROBACTER CLOACAE COMPLEX BY PCR: NOT DETECTED
ENTEROBACTERALES BY PCR: NOT DETECTED
ENTEROCOCCUS BY PCR: NOT DETECTED
EOSINOPHILS ABSOLUTE: 0 E9/L (ref 0.05–0.5)
EOSINOPHILS ABSOLUTE: 0.01 E9/L (ref 0.05–0.5)
EOSINOPHILS ABSOLUTE: 0.01 E9/L (ref 0.05–0.5)
EOSINOPHILS ABSOLUTE: 0.05 E9/L (ref 0.05–0.5)
EOSINOPHILS ABSOLUTE: 0.09 E9/L (ref 0.05–0.5)
EOSINOPHILS ABSOLUTE: 0.1 E9/L (ref 0.05–0.5)
EOSINOPHILS ABSOLUTE: 0.18 E9/L (ref 0.05–0.5)
EOSINOPHILS RELATIVE PERCENT: 0.1 % (ref 0–6)
EOSINOPHILS RELATIVE PERCENT: 0.5 % (ref 0–6)
EOSINOPHILS RELATIVE PERCENT: 0.8 % (ref 0–6)
EOSINOPHILS RELATIVE PERCENT: 0.9 % (ref 0–6)
EOSINOPHILS RELATIVE PERCENT: 1.2 % (ref 0–6)
EOSINOPHILS RELATIVE PERCENT: 2.7 % (ref 0–6)
EPITHELIAL CELLS, UA: ABNORMAL /HPF
ESCHERICHIA COLI BY PCR: NOT DETECTED
ETHANOL: <10 MG/DL (ref 0–0.08)
FENTANYL SCREEN, URINE: NOT DETECTED
GFR AFRICAN AMERICAN: >60
GFR NON-AFRICAN AMERICAN: 52 ML/MIN/1.73
GFR NON-AFRICAN AMERICAN: 59 ML/MIN/1.73
GFR NON-AFRICAN AMERICAN: >60 ML/MIN/1.73
GLUCOSE BLD-MCNC: 100 MG/DL (ref 74–99)
GLUCOSE BLD-MCNC: 114 MG/DL (ref 74–99)
GLUCOSE BLD-MCNC: 116 MG/DL (ref 74–99)
GLUCOSE BLD-MCNC: 121 MG/DL (ref 74–99)
GLUCOSE BLD-MCNC: 132 MG/DL (ref 74–99)
GLUCOSE BLD-MCNC: 137 MG/DL (ref 74–99)
GLUCOSE BLD-MCNC: 154 MG/DL (ref 74–99)
GLUCOSE BLD-MCNC: 71 MG/DL (ref 74–99)
GLUCOSE BLD-MCNC: 77 MG/DL (ref 74–99)
GLUCOSE BLD-MCNC: 83 MG/DL (ref 74–99)
GLUCOSE BLD-MCNC: 84 MG/DL
GLUCOSE BLD-MCNC: 85 MG/DL (ref 74–99)
GLUCOSE BLD-MCNC: 88 MG/DL (ref 74–99)
GLUCOSE BLD-MCNC: 95 MG/DL (ref 74–99)
GLUCOSE BLD-MCNC: 95 MG/DL (ref 74–99)
GLUCOSE BLD-MCNC: 97 MG/DL (ref 74–99)
GLUCOSE BLD-MCNC: 99 MG/DL (ref 74–99)
GLUCOSE URINE: NEGATIVE MG/DL
GLUCOSE URINE: NEGATIVE MG/DL
GRAM STAIN RESULT: ABNORMAL
HAEMOPHILUS INFLUENZAE BY PCR: NOT DETECTED
HCO3: 22.6 MMOL/L (ref 22–26)
HCO3: 24.9 MMOL/L (ref 22–26)
HCO3: 29.3 MMOL/L (ref 22–26)
HCT VFR BLD CALC: 31.3 % (ref 34–48)
HCT VFR BLD CALC: 32 % (ref 34–48)
HCT VFR BLD CALC: 32.2 % (ref 34–48)
HCT VFR BLD CALC: 32.4 % (ref 34–48)
HCT VFR BLD CALC: 32.8 % (ref 34–48)
HCT VFR BLD CALC: 33.4 % (ref 34–48)
HCT VFR BLD CALC: 34.3 % (ref 34–48)
HCT VFR BLD CALC: 34.4 % (ref 34–48)
HCT VFR BLD CALC: 34.7 % (ref 34–48)
HCT VFR BLD CALC: 34.7 % (ref 34–48)
HCT VFR BLD CALC: 35.6 % (ref 34–48)
HCT VFR BLD CALC: 36.3 % (ref 34–48)
HCT VFR BLD CALC: 36.6 % (ref 34–48)
HDLC SERPL-MCNC: 30 MG/DL
HEMOGLOBIN: 10.2 G/DL (ref 11.5–15.5)
HEMOGLOBIN: 10.4 G/DL (ref 11.5–15.5)
HEMOGLOBIN: 10.5 G/DL (ref 11.5–15.5)
HEMOGLOBIN: 11.2 G/DL (ref 11.5–15.5)
HEMOGLOBIN: 11.3 G/DL (ref 11.5–15.5)
HEMOGLOBIN: 11.5 G/DL (ref 11.5–15.5)
HEMOGLOBIN: 11.6 G/DL (ref 11.5–15.5)
HEMOGLOBIN: 11.6 G/DL (ref 11.5–15.5)
HEMOGLOBIN: 11.9 G/DL (ref 11.5–15.5)
HEMOGLOBIN: 12 G/DL (ref 11.5–15.5)
HEMOGLOBIN: 9.8 G/DL (ref 11.5–15.5)
HHB: 1.2 % (ref 0–5)
HHB: 1.4 % (ref 0–5)
HHB: 3.7 % (ref 0–5)
HYPOCHROMIA: ABNORMAL
IMMATURE GRANULOCYTES #: 0.03 E9/L
IMMATURE GRANULOCYTES #: 0.05 E9/L
IMMATURE GRANULOCYTES #: 0.14 E9/L
IMMATURE GRANULOCYTES #: 0.37 E9/L
IMMATURE GRANULOCYTES %: 0.5 % (ref 0–5)
IMMATURE GRANULOCYTES %: 0.6 % (ref 0–5)
IMMATURE GRANULOCYTES %: 1.2 % (ref 0–5)
IMMATURE GRANULOCYTES %: 2.2 % (ref 0–5)
KETONES, URINE: NEGATIVE MG/DL
KETONES, URINE: NEGATIVE MG/DL
KLEBSIELLA OXYTOCA BY PCR: NOT DETECTED
KLEBSIELLA PNEUMONIAE GROUP BY PCR: NOT DETECTED
L. PNEUMOPHILA SEROGP 1 UR AG: NORMAL
LAB: ABNORMAL
LACTIC ACID: 1.2 MMOL/L (ref 0.5–2.2)
LACTIC ACID: 1.3 MMOL/L (ref 0.5–2.2)
LDL CHOLESTEROL CALCULATED: 77 MG/DL (ref 0–99)
LEUKOCYTE ESTERASE, URINE: ABNORMAL
LEUKOCYTE ESTERASE, URINE: ABNORMAL
LISTERIA MONOCYTOGENES BY PCR: NOT DETECTED
LYMPHOCYTES ABSOLUTE: 1.47 E9/L (ref 1.5–4)
LYMPHOCYTES ABSOLUTE: 1.63 E9/L (ref 1.5–4)
LYMPHOCYTES ABSOLUTE: 1.72 E9/L (ref 1.5–4)
LYMPHOCYTES ABSOLUTE: 1.77 E9/L (ref 1.5–4)
LYMPHOCYTES ABSOLUTE: 1.84 E9/L (ref 1.5–4)
LYMPHOCYTES ABSOLUTE: 1.87 E9/L (ref 1.5–4)
LYMPHOCYTES ABSOLUTE: 1.98 E9/L (ref 1.5–4)
LYMPHOCYTES ABSOLUTE: 2.06 E9/L (ref 1.5–4)
LYMPHOCYTES ABSOLUTE: 2.63 E9/L (ref 1.5–4)
LYMPHOCYTES RELATIVE PERCENT: 13 % (ref 20–42)
LYMPHOCYTES RELATIVE PERCENT: 16.5 % (ref 20–42)
LYMPHOCYTES RELATIVE PERCENT: 20 % (ref 20–42)
LYMPHOCYTES RELATIVE PERCENT: 25.7 % (ref 20–42)
LYMPHOCYTES RELATIVE PERCENT: 28.5 % (ref 20–42)
LYMPHOCYTES RELATIVE PERCENT: 30.3 % (ref 20–42)
LYMPHOCYTES RELATIVE PERCENT: 9.5 % (ref 20–42)
Lab: ABNORMAL
Lab: NORMAL
MAGNESIUM: 2.1 MG/DL (ref 1.6–2.6)
MAGNESIUM: 2.3 MG/DL (ref 1.6–2.6)
MCH RBC QN AUTO: 34.2 PG (ref 26–35)
MCH RBC QN AUTO: 34.8 PG (ref 26–35)
MCH RBC QN AUTO: 34.9 PG (ref 26–35)
MCH RBC QN AUTO: 35 PG (ref 26–35)
MCH RBC QN AUTO: 35.1 PG (ref 26–35)
MCH RBC QN AUTO: 35.2 PG (ref 26–35)
MCH RBC QN AUTO: 35.3 PG (ref 26–35)
MCH RBC QN AUTO: 35.3 PG (ref 26–35)
MCH RBC QN AUTO: 35.4 PG (ref 26–35)
MCH RBC QN AUTO: 35.9 PG (ref 26–35)
MCH RBC QN AUTO: 36 PG (ref 26–35)
MCH RBC QN AUTO: 36.1 PG (ref 26–35)
MCH RBC QN AUTO: 36.2 PG (ref 26–35)
MCHC RBC AUTO-ENTMCNC: 30.5 % (ref 32–34.5)
MCHC RBC AUTO-ENTMCNC: 31.1 % (ref 32–34.5)
MCHC RBC AUTO-ENTMCNC: 31.3 % (ref 32–34.5)
MCHC RBC AUTO-ENTMCNC: 31.9 % (ref 32–34.5)
MCHC RBC AUTO-ENTMCNC: 32.3 % (ref 32–34.5)
MCHC RBC AUTO-ENTMCNC: 32.3 % (ref 32–34.5)
MCHC RBC AUTO-ENTMCNC: 32.4 % (ref 32–34.5)
MCHC RBC AUTO-ENTMCNC: 32.6 % (ref 32–34.5)
MCHC RBC AUTO-ENTMCNC: 32.6 % (ref 32–34.5)
MCHC RBC AUTO-ENTMCNC: 32.8 % (ref 32–34.5)
MCHC RBC AUTO-ENTMCNC: 32.8 % (ref 32–34.5)
MCHC RBC AUTO-ENTMCNC: 33.4 % (ref 32–34.5)
MCHC RBC AUTO-ENTMCNC: 33.8 % (ref 32–34.5)
MCV RBC AUTO: 106.2 FL (ref 80–99.9)
MCV RBC AUTO: 106.8 FL (ref 80–99.9)
MCV RBC AUTO: 107 FL (ref 80–99.9)
MCV RBC AUTO: 107.8 FL (ref 80–99.9)
MCV RBC AUTO: 108.1 FL (ref 80–99.9)
MCV RBC AUTO: 109.1 FL (ref 80–99.9)
MCV RBC AUTO: 109.5 FL (ref 80–99.9)
MCV RBC AUTO: 110.6 FL (ref 80–99.9)
MCV RBC AUTO: 110.7 FL (ref 80–99.9)
MCV RBC AUTO: 111 FL (ref 80–99.9)
MCV RBC AUTO: 112.1 FL (ref 80–99.9)
MCV RBC AUTO: 112.2 FL (ref 80–99.9)
MCV RBC AUTO: 112.3 FL (ref 80–99.9)
METAMYELOCYTES RELATIVE PERCENT: 1.7 % (ref 0–1)
METER GLUCOSE: 135 MG/DL (ref 74–99)
METER GLUCOSE: 84 MG/DL (ref 74–99)
METER GLUCOSE: 97 MG/DL (ref 74–99)
METHADONE SCREEN, URINE: NOT DETECTED
METHB: 0.2 % (ref 0–1.5)
METHB: 0.4 % (ref 0–1.5)
METHB: 0.5 % (ref 0–1.5)
METHICILLIN RESISTANCE MECA/C  BY PCR: DETECTED
MODE: ABNORMAL
MONOCYTES ABSOLUTE: 0.27 E9/L (ref 0.1–0.95)
MONOCYTES ABSOLUTE: 0.28 E9/L (ref 0.1–0.95)
MONOCYTES ABSOLUTE: 0.31 E9/L (ref 0.1–0.95)
MONOCYTES ABSOLUTE: 0.4 E9/L (ref 0.1–0.95)
MONOCYTES ABSOLUTE: 0.46 E9/L (ref 0.1–0.95)
MONOCYTES ABSOLUTE: 0.49 E9/L (ref 0.1–0.95)
MONOCYTES ABSOLUTE: 0.59 E9/L (ref 0.1–0.95)
MONOCYTES ABSOLUTE: 0.67 E9/L (ref 0.1–0.95)
MONOCYTES ABSOLUTE: 0.7 E9/L (ref 0.1–0.95)
MONOCYTES RELATIVE PERCENT: 2.6 % (ref 2–12)
MONOCYTES RELATIVE PERCENT: 2.6 % (ref 2–12)
MONOCYTES RELATIVE PERCENT: 4.1 % (ref 2–12)
MONOCYTES RELATIVE PERCENT: 4.3 % (ref 2–12)
MONOCYTES RELATIVE PERCENT: 4.4 % (ref 2–12)
MONOCYTES RELATIVE PERCENT: 5.3 % (ref 2–12)
MONOCYTES RELATIVE PERCENT: 5.9 % (ref 2–12)
MONOCYTES RELATIVE PERCENT: 6.1 % (ref 2–12)
MONOCYTES RELATIVE PERCENT: 7.5 % (ref 2–12)
NEISSERIA MENINGITIDIS BY PCR: NOT DETECTED
NEUTROPHILS ABSOLUTE: 14.38 E9/L (ref 1.8–7.3)
NEUTROPHILS ABSOLUTE: 4.1 E9/L (ref 1.8–7.3)
NEUTROPHILS ABSOLUTE: 4.56 E9/L (ref 1.8–7.3)
NEUTROPHILS ABSOLUTE: 5.42 E9/L (ref 1.8–7.3)
NEUTROPHILS ABSOLUTE: 7.08 E9/L (ref 1.8–7.3)
NEUTROPHILS ABSOLUTE: 7.33 E9/L (ref 1.8–7.3)
NEUTROPHILS ABSOLUTE: 7.93 E9/L (ref 1.8–7.3)
NEUTROPHILS ABSOLUTE: 7.98 E9/L (ref 1.8–7.3)
NEUTROPHILS ABSOLUTE: 9.04 E9/L (ref 1.8–7.3)
NEUTROPHILS RELATIVE PERCENT: 62.2 % (ref 43–80)
NEUTROPHILS RELATIVE PERCENT: 62.3 % (ref 43–80)
NEUTROPHILS RELATIVE PERCENT: 67.3 % (ref 43–80)
NEUTROPHILS RELATIVE PERCENT: 73.9 % (ref 43–80)
NEUTROPHILS RELATIVE PERCENT: 76.5 % (ref 43–80)
NEUTROPHILS RELATIVE PERCENT: 77.4 % (ref 43–80)
NEUTROPHILS RELATIVE PERCENT: 77.4 % (ref 43–80)
NEUTROPHILS RELATIVE PERCENT: 79.7 % (ref 43–80)
NEUTROPHILS RELATIVE PERCENT: 83.9 % (ref 43–80)
NITRITE, URINE: NEGATIVE
NITRITE, URINE: POSITIVE
NUCLEATED RED BLOOD CELLS: 0.9 /100 WBC
NUCLEATED RED BLOOD CELLS: 1.8 /100 WBC
O2 CONTENT: 14.6 ML/DL
O2 CONTENT: 17.1 ML/DL
O2 CONTENT: 17.6 ML/DL
O2 SATURATION: 96.3 % (ref 92–98.5)
O2 SATURATION: 98.6 % (ref 92–98.5)
O2 SATURATION: 98.8 % (ref 92–98.5)
O2HB: 95.4 % (ref 94–97)
O2HB: 97.6 % (ref 94–97)
O2HB: 97.6 % (ref 94–97)
OPERATOR ID: 101
OPERATOR ID: 1394
OPERATOR ID: 659
OPIATE SCREEN URINE: NOT DETECTED
ORDER NUMBER: ABNORMAL
ORGANISM: ABNORMAL
OVALOCYTES: ABNORMAL
OVALOCYTES: ABNORMAL
OXYCODONE URINE: NOT DETECTED
PATIENT TEMP: 37 C
PCO2: 37.9 MMHG (ref 35–45)
PCO2: 42 MMHG (ref 35–45)
PCO2: 42.6 MMHG (ref 35–45)
PDW BLD-RTO: 14.9 FL (ref 11.5–15)
PDW BLD-RTO: 15 FL (ref 11.5–15)
PDW BLD-RTO: 15.2 FL (ref 11.5–15)
PDW BLD-RTO: 15.3 FL (ref 11.5–15)
PDW BLD-RTO: 15.4 FL (ref 11.5–15)
PDW BLD-RTO: 15.5 FL (ref 11.5–15)
PDW BLD-RTO: 15.6 FL (ref 11.5–15)
PDW BLD-RTO: 15.7 FL (ref 11.5–15)
PDW BLD-RTO: 15.7 FL (ref 11.5–15)
PH BLOOD GAS: 7.39 (ref 7.35–7.45)
PH BLOOD GAS: 7.39 (ref 7.35–7.45)
PH BLOOD GAS: 7.46 (ref 7.35–7.45)
PH UA: 7 (ref 5–9)
PH UA: 7.5 (ref 5–9)
PHENCYCLIDINE SCREEN URINE: NOT DETECTED
PLATELET # BLD: 246 E9/L (ref 130–450)
PLATELET # BLD: 255 E9/L (ref 130–450)
PLATELET # BLD: 294 E9/L (ref 130–450)
PLATELET # BLD: 297 E9/L (ref 130–450)
PLATELET # BLD: 297 E9/L (ref 130–450)
PLATELET # BLD: 381 E9/L (ref 130–450)
PLATELET # BLD: 391 E9/L (ref 130–450)
PLATELET # BLD: 415 E9/L (ref 130–450)
PLATELET # BLD: 415 E9/L (ref 130–450)
PLATELET # BLD: 432 E9/L (ref 130–450)
PLATELET # BLD: 440 E9/L (ref 130–450)
PLATELET # BLD: 461 E9/L (ref 130–450)
PLATELET # BLD: 469 E9/L (ref 130–450)
PMV BLD AUTO: 10 FL (ref 7–12)
PMV BLD AUTO: 10.3 FL (ref 7–12)
PMV BLD AUTO: 10.4 FL (ref 7–12)
PMV BLD AUTO: 10.5 FL (ref 7–12)
PMV BLD AUTO: 10.5 FL (ref 7–12)
PMV BLD AUTO: 10.6 FL (ref 7–12)
PMV BLD AUTO: 10.7 FL (ref 7–12)
PMV BLD AUTO: 11 FL (ref 7–12)
PMV BLD AUTO: 9.8 FL (ref 7–12)
PMV BLD AUTO: 9.8 FL (ref 7–12)
PMV BLD AUTO: 9.9 FL (ref 7–12)
PO2: 118.6 MMHG (ref 75–100)
PO2: 147.5 MMHG (ref 75–100)
PO2: 80.5 MMHG (ref 75–100)
POIKILOCYTES: ABNORMAL
POIKILOCYTES: ABNORMAL
POLYCHROMASIA: ABNORMAL
POTASSIUM REFLEX MAGNESIUM: 3.5 MMOL/L (ref 3.5–5)
POTASSIUM REFLEX MAGNESIUM: 4.6 MMOL/L (ref 3.5–5)
POTASSIUM REFLEX MAGNESIUM: 5.1 MMOL/L (ref 3.5–5)
POTASSIUM SERPL-SCNC: 3.2 MMOL/L (ref 3.5–5)
POTASSIUM SERPL-SCNC: 3.4 MMOL/L (ref 3.5–5)
POTASSIUM SERPL-SCNC: 3.5 MMOL/L (ref 3.5–5)
POTASSIUM SERPL-SCNC: 3.5 MMOL/L (ref 3.5–5)
POTASSIUM SERPL-SCNC: 3.6 MMOL/L (ref 3.5–5)
POTASSIUM SERPL-SCNC: 3.8 MMOL/L (ref 3.5–5)
POTASSIUM SERPL-SCNC: 3.9 MMOL/L (ref 3.5–5)
POTASSIUM SERPL-SCNC: 4 MMOL/L (ref 3.5–5)
POTASSIUM SERPL-SCNC: 4.5 MMOL/L (ref 3.5–5)
POTASSIUM SERPL-SCNC: 4.6 MMOL/L (ref 3.5–5)
POTASSIUM SERPL-SCNC: 4.7 MMOL/L (ref 3.5–5)
PRO-BNP: 3769 PG/ML (ref 0–450)
PROCALCITONIN: 0.21 NG/ML (ref 0–0.08)
PROTEIN UA: 30 MG/DL
PROTEIN UA: NEGATIVE MG/DL
PROTEUS SPECIES BY PCR: NOT DETECTED
PSEUDOMONAS AERUGINOSA BY PCR: NOT DETECTED
RBC # BLD: 2.82 E12/L (ref 3.5–5.5)
RBC # BLD: 2.87 E12/L (ref 3.5–5.5)
RBC # BLD: 2.89 E12/L (ref 3.5–5.5)
RBC # BLD: 2.92 E12/L (ref 3.5–5.5)
RBC # BLD: 2.97 E12/L (ref 3.5–5.5)
RBC # BLD: 2.98 E12/L (ref 3.5–5.5)
RBC # BLD: 3.17 E12/L (ref 3.5–5.5)
RBC # BLD: 3.19 E12/L (ref 3.5–5.5)
RBC # BLD: 3.21 E12/L (ref 3.5–5.5)
RBC # BLD: 3.22 E12/L (ref 3.5–5.5)
RBC # BLD: 3.23 E12/L (ref 3.5–5.5)
RBC # BLD: 3.4 E12/L (ref 3.5–5.5)
RBC # BLD: 3.42 E12/L (ref 3.5–5.5)
RBC UA: ABNORMAL /HPF (ref 0–2)
RBC UA: ABNORMAL /HPF (ref 0–2)
RENAL EPITHELIAL, UA: ABNORMAL /HPF
SALICYLATE, SERUM: <0.3 MG/DL (ref 0–30)
SARS-COV-2, NAAT: NOT DETECTED
SARS-COV-2: NOT DETECTED
SERRATIA MARCESCENS BY PCR: NOT DETECTED
SODIUM BLD-SCNC: 131 MMOL/L (ref 132–146)
SODIUM BLD-SCNC: 135 MMOL/L (ref 132–146)
SODIUM BLD-SCNC: 136 MMOL/L (ref 132–146)
SODIUM BLD-SCNC: 138 MMOL/L (ref 132–146)
SODIUM BLD-SCNC: 139 MMOL/L (ref 132–146)
SODIUM BLD-SCNC: 143 MMOL/L (ref 132–146)
SODIUM BLD-SCNC: 144 MMOL/L (ref 132–146)
SODIUM BLD-SCNC: 146 MMOL/L (ref 132–146)
SODIUM BLD-SCNC: 148 MMOL/L (ref 132–146)
SOURCE OF BLOOD CULTURE: ABNORMAL
SOURCE, BLOOD GAS: ABNORMAL
SOURCE: NORMAL
SPECIFIC GRAVITY UA: 1.01 (ref 1–1.03)
SPECIFIC GRAVITY UA: 1.01 (ref 1–1.03)
STAPHYLOCOCCUS AUREUS BY PCR: NOT DETECTED
STAPHYLOCOCCUS SPECIES BY PCR: DETECTED
STREP PNEUMONIAE ANTIGEN, URINE: NORMAL
STREPTOCOCCUS AGALACTIAE BY PCR: NOT DETECTED
STREPTOCOCCUS PNEUMONIAE BY PCR: NOT DETECTED
STREPTOCOCCUS PYOGENES  BY PCR: NOT DETECTED
STREPTOCOCCUS SPECIES BY PCR: NOT DETECTED
T4 FREE: 1.24 NG/DL (ref 0.93–1.7)
THB: 10.8 G/DL (ref 11.5–16.5)
THB: 12.3 G/DL (ref 11.5–16.5)
THB: 12.7 G/DL (ref 11.5–16.5)
TIME ANALYZED: 1224
TIME ANALYZED: 1252
TIME ANALYZED: 1422
TOTAL PROTEIN: 5.8 G/DL (ref 6.4–8.3)
TOTAL PROTEIN: 6.1 G/DL (ref 6.4–8.3)
TOTAL PROTEIN: 6.2 G/DL (ref 6.4–8.3)
TOTAL PROTEIN: 6.2 G/DL (ref 6.4–8.3)
TOTAL PROTEIN: 6.3 G/DL (ref 6.4–8.3)
TOTAL PROTEIN: 6.4 G/DL (ref 6.4–8.3)
TOTAL PROTEIN: 6.6 G/DL (ref 6.4–8.3)
TOTAL PROTEIN: 6.9 G/DL (ref 6.4–8.3)
TOTAL PROTEIN: 7.1 G/DL (ref 6.4–8.3)
TRICYCLIC ANTIDEPRESSANTS SCREEN SERUM: NEGATIVE NG/ML
TRIGL SERPL-MCNC: 191 MG/DL (ref 0–149)
TROPONIN: 0.01 NG/ML (ref 0–0.03)
TROPONIN: 0.02 NG/ML (ref 0–0.03)
TROPONIN: <0.01 NG/ML (ref 0–0.03)
TSH SERPL DL<=0.05 MIU/L-ACNC: 1.87 UIU/ML (ref 0.27–4.2)
TSH SERPL DL<=0.05 MIU/L-ACNC: 5.23 UIU/ML (ref 0.27–4.2)
TSH SERPL DL<=0.05 MIU/L-ACNC: 5.73 UIU/ML (ref 0.27–4.2)
TSH SERPL DL<=0.05 MIU/L-ACNC: 6.78 UIU/ML (ref 0.27–4.2)
TSH SERPL DL<=0.05 MIU/L-ACNC: 6.8 UIU/ML (ref 0.27–4.2)
URINE CULTURE, ROUTINE: ABNORMAL
URINE CULTURE, ROUTINE: ABNORMAL
UROBILINOGEN, URINE: 0.2 E.U./DL
UROBILINOGEN, URINE: 1 E.U./DL
VLDLC SERPL CALC-MCNC: 38 MG/DL
WBC # BLD: 10.1 E9/L (ref 4.5–11.5)
WBC # BLD: 10.3 E9/L (ref 4.5–11.5)
WBC # BLD: 11.3 E9/L (ref 4.5–11.5)
WBC # BLD: 17.1 E9/L (ref 4.5–11.5)
WBC # BLD: 6.6 E9/L (ref 4.5–11.5)
WBC # BLD: 6.6 E9/L (ref 4.5–11.5)
WBC # BLD: 6.8 E9/L (ref 4.5–11.5)
WBC # BLD: 6.8 E9/L (ref 4.5–11.5)
WBC # BLD: 7.1 E9/L (ref 4.5–11.5)
WBC # BLD: 8 E9/L (ref 4.5–11.5)
WBC # BLD: 8.7 E9/L (ref 4.5–11.5)
WBC # BLD: 9.2 E9/L (ref 4.5–11.5)
WBC # BLD: 9.9 E9/L (ref 4.5–11.5)
WBC UA: ABNORMAL /HPF (ref 0–5)
WBC UA: ABNORMAL /HPF (ref 0–5)
WOUND/ABSCESS: ABNORMAL
WOUND/ABSCESS: ABNORMAL

## 2021-01-01 PROCEDURE — 76705 ECHO EXAM OF ABDOMEN: CPT

## 2021-01-01 PROCEDURE — 6360000002 HC RX W HCPCS: Performed by: INTERNAL MEDICINE

## 2021-01-01 PROCEDURE — 36415 COLL VENOUS BLD VENIPUNCTURE: CPT

## 2021-01-01 PROCEDURE — 2500000003 HC RX 250 WO HCPCS: Performed by: NURSE PRACTITIONER

## 2021-01-01 PROCEDURE — 6360000002 HC RX W HCPCS: Performed by: HOSPITALIST

## 2021-01-01 PROCEDURE — 87449 NOS EACH ORGANISM AG IA: CPT

## 2021-01-01 PROCEDURE — 2060000000 HC ICU INTERMEDIATE R&B

## 2021-01-01 PROCEDURE — 97530 THERAPEUTIC ACTIVITIES: CPT

## 2021-01-01 PROCEDURE — 82140 ASSAY OF AMMONIA: CPT

## 2021-01-01 PROCEDURE — 97166 OT EVAL MOD COMPLEX 45 MIN: CPT

## 2021-01-01 PROCEDURE — 2700000000 HC OXYGEN THERAPY PER DAY

## 2021-01-01 PROCEDURE — 6360000002 HC RX W HCPCS: Performed by: STUDENT IN AN ORGANIZED HEALTH CARE EDUCATION/TRAINING PROGRAM

## 2021-01-01 PROCEDURE — 83605 ASSAY OF LACTIC ACID: CPT

## 2021-01-01 PROCEDURE — 93010 ELECTROCARDIOGRAM REPORT: CPT | Performed by: INTERNAL MEDICINE

## 2021-01-01 PROCEDURE — 6370000000 HC RX 637 (ALT 250 FOR IP): Performed by: HOSPITALIST

## 2021-01-01 PROCEDURE — 6370000000 HC RX 637 (ALT 250 FOR IP): Performed by: INTERNAL MEDICINE

## 2021-01-01 PROCEDURE — 2580000003 HC RX 258

## 2021-01-01 PROCEDURE — 87040 BLOOD CULTURE FOR BACTERIA: CPT

## 2021-01-01 PROCEDURE — 94640 AIRWAY INHALATION TREATMENT: CPT

## 2021-01-01 PROCEDURE — 2580000003 HC RX 258: Performed by: INTERNAL MEDICINE

## 2021-01-01 PROCEDURE — 80053 COMPREHEN METABOLIC PANEL: CPT

## 2021-01-01 PROCEDURE — 2580000003 HC RX 258: Performed by: HOSPITALIST

## 2021-01-01 PROCEDURE — 1200000000 HC SEMI PRIVATE

## 2021-01-01 PROCEDURE — 94660 CPAP INITIATION&MGMT: CPT

## 2021-01-01 PROCEDURE — 6360000002 HC RX W HCPCS: Performed by: NURSE PRACTITIONER

## 2021-01-01 PROCEDURE — 80143 DRUG ASSAY ACETAMINOPHEN: CPT

## 2021-01-01 PROCEDURE — 74230 X-RAY XM SWLNG FUNCJ C+: CPT

## 2021-01-01 PROCEDURE — 82533 TOTAL CORTISOL: CPT

## 2021-01-01 PROCEDURE — 71045 X-RAY EXAM CHEST 1 VIEW: CPT

## 2021-01-01 PROCEDURE — 6370000000 HC RX 637 (ALT 250 FOR IP): Performed by: NURSE PRACTITIONER

## 2021-01-01 PROCEDURE — 71260 CT THORAX DX C+: CPT

## 2021-01-01 PROCEDURE — 85025 COMPLETE CBC W/AUTO DIFF WBC: CPT

## 2021-01-01 PROCEDURE — 72125 CT NECK SPINE W/O DYE: CPT

## 2021-01-01 PROCEDURE — 87075 CULTR BACTERIA EXCEPT BLOOD: CPT

## 2021-01-01 PROCEDURE — 81001 URINALYSIS AUTO W/SCOPE: CPT

## 2021-01-01 PROCEDURE — 84484 ASSAY OF TROPONIN QUANT: CPT

## 2021-01-01 PROCEDURE — 2500000003 HC RX 250 WO HCPCS: Performed by: INTERNAL MEDICINE

## 2021-01-01 PROCEDURE — 82962 GLUCOSE BLOOD TEST: CPT

## 2021-01-01 PROCEDURE — 87077 CULTURE AEROBIC IDENTIFY: CPT

## 2021-01-01 PROCEDURE — 2500000003 HC RX 250 WO HCPCS: Performed by: STUDENT IN AN ORGANIZED HEALTH CARE EDUCATION/TRAINING PROGRAM

## 2021-01-01 PROCEDURE — 93005 ELECTROCARDIOGRAM TRACING: CPT | Performed by: STUDENT IN AN ORGANIZED HEALTH CARE EDUCATION/TRAINING PROGRAM

## 2021-01-01 PROCEDURE — 82077 ASSAY SPEC XCP UR&BREATH IA: CPT

## 2021-01-01 PROCEDURE — 2580000003 HC RX 258: Performed by: STUDENT IN AN ORGANIZED HEALTH CARE EDUCATION/TRAINING PROGRAM

## 2021-01-01 PROCEDURE — 2140000000 HC CCU INTERMEDIATE R&B

## 2021-01-01 PROCEDURE — 2580000003 HC RX 258: Performed by: NURSE PRACTITIONER

## 2021-01-01 PROCEDURE — 97535 SELF CARE MNGMENT TRAINING: CPT

## 2021-01-01 PROCEDURE — 85027 COMPLETE CBC AUTOMATED: CPT

## 2021-01-01 PROCEDURE — 87070 CULTURE OTHR SPECIMN AEROBIC: CPT

## 2021-01-01 PROCEDURE — 2500000003 HC RX 250 WO HCPCS: Performed by: HOSPITALIST

## 2021-01-01 PROCEDURE — 87088 URINE BACTERIA CULTURE: CPT

## 2021-01-01 PROCEDURE — 87205 SMEAR GRAM STAIN: CPT

## 2021-01-01 PROCEDURE — 87186 SC STD MICRODIL/AGAR DIL: CPT

## 2021-01-01 PROCEDURE — 83880 ASSAY OF NATRIURETIC PEPTIDE: CPT

## 2021-01-01 PROCEDURE — 92611 MOTION FLUOROSCOPY/SWALLOW: CPT | Performed by: SPEECH-LANGUAGE PATHOLOGIST

## 2021-01-01 PROCEDURE — 70551 MRI BRAIN STEM W/O DYE: CPT

## 2021-01-01 PROCEDURE — 97162 PT EVAL MOD COMPLEX 30 MIN: CPT

## 2021-01-01 PROCEDURE — 2580000003 HC RX 258: Performed by: FAMILY MEDICINE

## 2021-01-01 PROCEDURE — 80048 BASIC METABOLIC PNL TOTAL CA: CPT

## 2021-01-01 PROCEDURE — 83735 ASSAY OF MAGNESIUM: CPT

## 2021-01-01 PROCEDURE — 82805 BLOOD GASES W/O2 SATURATION: CPT

## 2021-01-01 PROCEDURE — 70450 CT HEAD/BRAIN W/O DYE: CPT

## 2021-01-01 PROCEDURE — 99284 EMERGENCY DEPT VISIT MOD MDM: CPT

## 2021-01-01 PROCEDURE — 97161 PT EVAL LOW COMPLEX 20 MIN: CPT

## 2021-01-01 PROCEDURE — 0J970ZZ DRAINAGE OF BACK SUBCUTANEOUS TISSUE AND FASCIA, OPEN APPROACH: ICD-10-PCS | Performed by: STUDENT IN AN ORGANIZED HEALTH CARE EDUCATION/TRAINING PROGRAM

## 2021-01-01 PROCEDURE — 87635 SARS-COV-2 COVID-19 AMP PRB: CPT

## 2021-01-01 PROCEDURE — 96375 TX/PRO/DX INJ NEW DRUG ADDON: CPT

## 2021-01-01 PROCEDURE — 80307 DRUG TEST PRSMV CHEM ANLYZR: CPT

## 2021-01-01 PROCEDURE — 96365 THER/PROPH/DIAG IV INF INIT: CPT

## 2021-01-01 PROCEDURE — 6360000004 HC RX CONTRAST MEDICATION: Performed by: RADIOLOGY

## 2021-01-01 PROCEDURE — 2500000003 HC RX 250 WO HCPCS: Performed by: RADIOLOGY

## 2021-01-01 PROCEDURE — 84145 PROCALCITONIN (PCT): CPT

## 2021-01-01 PROCEDURE — 80179 DRUG ASSAY SALICYLATE: CPT

## 2021-01-01 PROCEDURE — 99223 1ST HOSP IP/OBS HIGH 75: CPT | Performed by: NURSE PRACTITIONER

## 2021-01-01 PROCEDURE — 36600 WITHDRAWAL OF ARTERIAL BLOOD: CPT

## 2021-01-01 PROCEDURE — 31720 CLEARANCE OF AIRWAYS: CPT

## 2021-01-01 PROCEDURE — 74177 CT ABD & PELVIS W/CONTRAST: CPT

## 2021-01-01 PROCEDURE — 94664 DEMO&/EVAL PT USE INHALER: CPT

## 2021-01-01 RX ORDER — SODIUM CHLORIDE 9 MG/ML
25 INJECTION, SOLUTION INTRAVENOUS PRN
Status: DISCONTINUED | OUTPATIENT
Start: 2021-01-01 | End: 2021-01-01 | Stop reason: HOSPADM

## 2021-01-01 RX ORDER — HALOPERIDOL 5 MG/ML
5 INJECTION INTRAMUSCULAR ONCE
Status: DISCONTINUED | OUTPATIENT
Start: 2021-01-01 | End: 2021-01-01

## 2021-01-01 RX ORDER — RIVASTIGMINE 4.6 MG/24H
1 PATCH, EXTENDED RELEASE TRANSDERMAL DAILY
Status: DISCONTINUED | OUTPATIENT
Start: 2021-01-01 | End: 2021-01-01 | Stop reason: HOSPADM

## 2021-01-01 RX ORDER — PROMETHAZINE HYDROCHLORIDE 25 MG/1
12.5 TABLET ORAL EVERY 6 HOURS PRN
Status: DISCONTINUED | OUTPATIENT
Start: 2021-01-01 | End: 2021-01-01 | Stop reason: HOSPADM

## 2021-01-01 RX ORDER — POLYETHYLENE GLYCOL 3350 17 G/17G
17 POWDER, FOR SOLUTION ORAL DAILY PRN
Status: DISCONTINUED | OUTPATIENT
Start: 2021-01-01 | End: 2021-01-01 | Stop reason: HOSPADM

## 2021-01-01 RX ORDER — FERROUS SULFATE 300 MG/5ML
300 LIQUID (ML) ORAL 2 TIMES DAILY
Status: DISCONTINUED | OUTPATIENT
Start: 2021-01-01 | End: 2021-01-01 | Stop reason: HOSPADM

## 2021-01-01 RX ORDER — LEVOTHYROXINE SODIUM 0.05 MG/1
50 TABLET ORAL DAILY
COMMUNITY

## 2021-01-01 RX ORDER — DOCUSATE SODIUM 100 MG/1
100 CAPSULE, LIQUID FILLED ORAL DAILY
Status: DISCONTINUED | OUTPATIENT
Start: 2021-01-01 | End: 2021-01-01 | Stop reason: HOSPADM

## 2021-01-01 RX ORDER — AMOXICILLIN AND CLAVULANATE POTASSIUM 875; 125 MG/1; MG/1
1 TABLET, FILM COATED ORAL 2 TIMES DAILY
DISCHARGE
Start: 2021-01-01 | End: 2021-01-01

## 2021-01-01 RX ORDER — M-VIT,TX,IRON,MINS/CALC/FOLIC 27MG-0.4MG
1 TABLET ORAL DAILY
Status: DISCONTINUED | OUTPATIENT
Start: 2021-01-01 | End: 2021-01-01 | Stop reason: HOSPADM

## 2021-01-01 RX ORDER — SODIUM CHLORIDE 9 MG/ML
INJECTION, SOLUTION INTRAVENOUS CONTINUOUS
Status: DISCONTINUED | OUTPATIENT
Start: 2021-01-01 | End: 2021-01-01

## 2021-01-01 RX ORDER — SULFAMETHOXAZOLE AND TRIMETHOPRIM 800; 160 MG/1; MG/1
1 TABLET ORAL EVERY 12 HOURS SCHEDULED
Status: DISCONTINUED | OUTPATIENT
Start: 2021-01-01 | End: 2021-01-01 | Stop reason: HOSPADM

## 2021-01-01 RX ORDER — PANTOPRAZOLE SODIUM 40 MG/1
40 TABLET, DELAYED RELEASE ORAL
Status: DISCONTINUED | OUTPATIENT
Start: 2021-01-01 | End: 2021-01-01 | Stop reason: HOSPADM

## 2021-01-01 RX ORDER — METOPROLOL TARTRATE 50 MG/1
25 TABLET, FILM COATED ORAL 2 TIMES DAILY
Qty: 60 TABLET | Refills: 3 | Status: SHIPPED | OUTPATIENT
Start: 2021-01-01

## 2021-01-01 RX ORDER — ONDANSETRON 2 MG/ML
4 INJECTION INTRAMUSCULAR; INTRAVENOUS EVERY 6 HOURS PRN
Status: DISCONTINUED | OUTPATIENT
Start: 2021-01-01 | End: 2021-01-01 | Stop reason: HOSPADM

## 2021-01-01 RX ORDER — LEVOTHYROXINE SODIUM 0.05 MG/1
50 TABLET ORAL DAILY
Status: DISCONTINUED | OUTPATIENT
Start: 2021-01-01 | End: 2021-01-01 | Stop reason: HOSPADM

## 2021-01-01 RX ORDER — POTASSIUM CHLORIDE 20 MEQ/1
40 TABLET, EXTENDED RELEASE ORAL PRN
Status: DISCONTINUED | OUTPATIENT
Start: 2021-01-01 | End: 2021-01-01 | Stop reason: HOSPADM

## 2021-01-01 RX ORDER — RIVASTIGMINE 13.3 MG/24H
1 PATCH, EXTENDED RELEASE TRANSDERMAL DAILY
Status: DISCONTINUED | OUTPATIENT
Start: 2021-01-01 | End: 2021-01-01

## 2021-01-01 RX ORDER — MINERAL OIL/HYDROPHIL PETROLAT
OINTMENT (GRAM) TOPICAL 2 TIMES DAILY PRN
Status: DISCONTINUED | OUTPATIENT
Start: 2021-01-01 | End: 2021-01-01 | Stop reason: HOSPADM

## 2021-01-01 RX ORDER — ACETAMINOPHEN 325 MG/1
650 TABLET ORAL EVERY 4 HOURS PRN
Status: DISCONTINUED | OUTPATIENT
Start: 2021-01-01 | End: 2021-01-01 | Stop reason: SDUPTHER

## 2021-01-01 RX ORDER — SODIUM CHLORIDE 0.9 % (FLUSH) 0.9 %
5-40 SYRINGE (ML) INJECTION EVERY 12 HOURS SCHEDULED
Status: DISCONTINUED | OUTPATIENT
Start: 2021-01-01 | End: 2021-01-01 | Stop reason: HOSPADM

## 2021-01-01 RX ORDER — ZIPRASIDONE MESYLATE 20 MG/ML
10 INJECTION, POWDER, LYOPHILIZED, FOR SOLUTION INTRAMUSCULAR ONCE
Status: COMPLETED | OUTPATIENT
Start: 2021-01-01 | End: 2021-01-01

## 2021-01-01 RX ORDER — METOPROLOL SUCCINATE 50 MG/1
50 TABLET, EXTENDED RELEASE ORAL DAILY
Status: DISCONTINUED | OUTPATIENT
Start: 2021-01-01 | End: 2021-01-01

## 2021-01-01 RX ORDER — DOXYCYCLINE HYCLATE 100 MG
100 TABLET ORAL 2 TIMES DAILY
Qty: 20 TABLET | Refills: 0 | DISCHARGE
Start: 2021-01-01 | End: 2021-01-01 | Stop reason: HOSPADM

## 2021-01-01 RX ORDER — MINERAL OIL/HYDROPHIL PETROLAT
1 OINTMENT (GRAM) TOPICAL 2 TIMES DAILY
Refills: 0 | DISCHARGE
Start: 2021-01-01 | End: 2021-01-01

## 2021-01-01 RX ORDER — 0.9 % SODIUM CHLORIDE 0.9 %
500 INTRAVENOUS SOLUTION INTRAVENOUS ONCE
Status: COMPLETED | OUTPATIENT
Start: 2021-01-01 | End: 2021-01-01

## 2021-01-01 RX ORDER — SODIUM CHLORIDE 9 MG/ML
INJECTION, SOLUTION INTRAVENOUS CONTINUOUS
Status: ACTIVE | OUTPATIENT
Start: 2021-01-01 | End: 2021-01-01

## 2021-01-01 RX ORDER — SODIUM CHLORIDE 0.9 % (FLUSH) 0.9 %
10 SYRINGE (ML) INJECTION EVERY 12 HOURS SCHEDULED
Status: DISCONTINUED | OUTPATIENT
Start: 2021-01-01 | End: 2021-01-01 | Stop reason: HOSPADM

## 2021-01-01 RX ORDER — MINERAL OIL/HYDROPHIL PETROLAT
OINTMENT (GRAM) TOPICAL 2 TIMES DAILY
Status: DISCONTINUED | OUTPATIENT
Start: 2021-01-01 | End: 2021-01-01 | Stop reason: HOSPADM

## 2021-01-01 RX ORDER — ACETAMINOPHEN 650 MG/1
650 SUPPOSITORY RECTAL EVERY 6 HOURS PRN
Status: DISCONTINUED | OUTPATIENT
Start: 2021-01-01 | End: 2021-01-01 | Stop reason: HOSPADM

## 2021-01-01 RX ORDER — ERGOCALCIFEROL 1.25 MG/1
50000 CAPSULE ORAL
Status: DISCONTINUED | OUTPATIENT
Start: 2021-01-01 | End: 2021-01-01 | Stop reason: HOSPADM

## 2021-01-01 RX ORDER — GLYCOPYRROLATE 0.2 MG/ML
0.1 INJECTION INTRAMUSCULAR; INTRAVENOUS ONCE
Status: COMPLETED | OUTPATIENT
Start: 2021-01-01 | End: 2021-01-01

## 2021-01-01 RX ORDER — SULFAMETHOXAZOLE AND TRIMETHOPRIM 800; 160 MG/1; MG/1
1 TABLET ORAL 2 TIMES DAILY
Qty: 20 TABLET | Refills: 0 | DISCHARGE
Start: 2021-01-01 | End: 2021-01-01

## 2021-01-01 RX ORDER — FERROUS SULFATE 325(65) MG
325 TABLET ORAL 2 TIMES DAILY
Status: DISCONTINUED | OUTPATIENT
Start: 2021-01-01 | End: 2021-01-01 | Stop reason: ALTCHOICE

## 2021-01-01 RX ORDER — RISPERIDONE 0.5 MG/1
0.5 TABLET, FILM COATED ORAL 2 TIMES DAILY
Status: DISCONTINUED | OUTPATIENT
Start: 2021-01-01 | End: 2021-01-01 | Stop reason: HOSPADM

## 2021-01-01 RX ORDER — MEMANTINE HYDROCHLORIDE 10 MG/1
10 TABLET ORAL 2 TIMES DAILY
COMMUNITY

## 2021-01-01 RX ORDER — SODIUM CHLORIDE 0.9 % (FLUSH) 0.9 %
10 SYRINGE (ML) INJECTION PRN
Status: DISCONTINUED | OUTPATIENT
Start: 2021-01-01 | End: 2021-01-01 | Stop reason: HOSPADM

## 2021-01-01 RX ORDER — METOPROLOL SUCCINATE 50 MG/1
50 TABLET, EXTENDED RELEASE ORAL DAILY
Status: DISCONTINUED | OUTPATIENT
Start: 2021-01-01 | End: 2021-01-01 | Stop reason: HOSPADM

## 2021-01-01 RX ORDER — AMOXICILLIN AND CLAVULANATE POTASSIUM 875; 125 MG/1; MG/1
1 TABLET, FILM COATED ORAL EVERY 12 HOURS SCHEDULED
Status: DISCONTINUED | OUTPATIENT
Start: 2021-01-01 | End: 2021-01-01 | Stop reason: HOSPADM

## 2021-01-01 RX ORDER — METOPROLOL SUCCINATE 25 MG/1
25 TABLET, EXTENDED RELEASE ORAL DAILY
Status: DISCONTINUED | OUTPATIENT
Start: 2021-01-01 | End: 2021-01-01 | Stop reason: HOSPADM

## 2021-01-01 RX ORDER — PANTOPRAZOLE SODIUM 20 MG/1
20 TABLET, DELAYED RELEASE ORAL DAILY
Status: DISCONTINUED | OUTPATIENT
Start: 2021-01-01 | End: 2021-01-01 | Stop reason: HOSPADM

## 2021-01-01 RX ORDER — RISPERIDONE 1 MG/1
1 TABLET, FILM COATED ORAL 2 TIMES DAILY
Status: DISCONTINUED | OUTPATIENT
Start: 2021-01-01 | End: 2021-01-01 | Stop reason: HOSPADM

## 2021-01-01 RX ORDER — LIDOCAINE HYDROCHLORIDE AND EPINEPHRINE 10; 10 MG/ML; UG/ML
20 INJECTION, SOLUTION INFILTRATION; PERINEURAL ONCE
Status: COMPLETED | OUTPATIENT
Start: 2021-01-01 | End: 2021-01-01

## 2021-01-01 RX ORDER — ACETAMINOPHEN 325 MG/1
650 TABLET ORAL EVERY 6 HOURS PRN
Status: DISCONTINUED | OUTPATIENT
Start: 2021-01-01 | End: 2021-01-01 | Stop reason: HOSPADM

## 2021-01-01 RX ORDER — POTASSIUM CHLORIDE 7.45 MG/ML
10 INJECTION INTRAVENOUS PRN
Status: DISCONTINUED | OUTPATIENT
Start: 2021-01-01 | End: 2021-01-01 | Stop reason: HOSPADM

## 2021-01-01 RX ORDER — IPRATROPIUM BROMIDE AND ALBUTEROL SULFATE 2.5; .5 MG/3ML; MG/3ML
3 SOLUTION RESPIRATORY (INHALATION)
Qty: 360 ML | Refills: 0 | Status: SHIPPED | OUTPATIENT
Start: 2021-01-01

## 2021-01-01 RX ORDER — FERROUS SULFATE 325(65) MG
325 TABLET ORAL 2 TIMES DAILY
Status: DISCONTINUED | OUTPATIENT
Start: 2021-01-01 | End: 2021-01-01 | Stop reason: HOSPADM

## 2021-01-01 RX ORDER — MEMANTINE HYDROCHLORIDE 10 MG/1
10 TABLET ORAL 2 TIMES DAILY
Status: DISCONTINUED | OUTPATIENT
Start: 2021-01-01 | End: 2021-01-01 | Stop reason: HOSPADM

## 2021-01-01 RX ORDER — ALBUTEROL SULFATE 2.5 MG/3ML
2.5 SOLUTION RESPIRATORY (INHALATION) EVERY 6 HOURS PRN
Status: DISCONTINUED | OUTPATIENT
Start: 2021-01-01 | End: 2021-01-01 | Stop reason: HOSPADM

## 2021-01-01 RX ORDER — SODIUM CHLORIDE 0.9 % (FLUSH) 0.9 %
5-40 SYRINGE (ML) INJECTION PRN
Status: DISCONTINUED | OUTPATIENT
Start: 2021-01-01 | End: 2021-01-01 | Stop reason: HOSPADM

## 2021-01-01 RX ORDER — IPRATROPIUM BROMIDE AND ALBUTEROL SULFATE 2.5; .5 MG/3ML; MG/3ML
1 SOLUTION RESPIRATORY (INHALATION)
Status: DISCONTINUED | OUTPATIENT
Start: 2021-01-01 | End: 2021-01-01 | Stop reason: HOSPADM

## 2021-01-01 RX ORDER — 0.9 % SODIUM CHLORIDE 0.9 %
1000 INTRAVENOUS SOLUTION INTRAVENOUS ONCE
Status: COMPLETED | OUTPATIENT
Start: 2021-01-01 | End: 2021-01-01

## 2021-01-01 RX ORDER — DOXYCYCLINE HYCLATE 100 MG/1
100 CAPSULE ORAL EVERY 12 HOURS SCHEDULED
Status: DISCONTINUED | OUTPATIENT
Start: 2021-01-01 | End: 2021-01-01

## 2021-01-01 RX ADMIN — SODIUM CHLORIDE: 9 INJECTION, SOLUTION INTRAVENOUS at 11:42

## 2021-01-01 RX ADMIN — RISPERIDONE 1 MG: 1 TABLET, FILM COATED ORAL at 11:23

## 2021-01-01 RX ADMIN — IPRATROPIUM BROMIDE AND ALBUTEROL SULFATE 1 AMPULE: .5; 3 SOLUTION RESPIRATORY (INHALATION) at 08:04

## 2021-01-01 RX ADMIN — GLYCOPYRROLATE 0.1 MG: 0.2 INJECTION INTRAMUSCULAR; INTRAVENOUS at 12:02

## 2021-01-01 RX ADMIN — IPRATROPIUM BROMIDE AND ALBUTEROL SULFATE 1 AMPULE: .5; 3 SOLUTION RESPIRATORY (INHALATION) at 13:03

## 2021-01-01 RX ADMIN — ERGOCALCIFEROL 50000 UNITS: 1.25 CAPSULE, LIQUID FILLED ORAL at 10:00

## 2021-01-01 RX ADMIN — Medication: at 21:41

## 2021-01-01 RX ADMIN — SODIUM CHLORIDE: 9 INJECTION, SOLUTION INTRAVENOUS at 11:38

## 2021-01-01 RX ADMIN — AMOXICILLIN AND CLAVULANATE POTASSIUM 1 TABLET: 875; 125 TABLET, FILM COATED ORAL at 21:30

## 2021-01-01 RX ADMIN — WATER 10 ML: 1 INJECTION INTRAMUSCULAR; INTRAVENOUS; SUBCUTANEOUS at 14:07

## 2021-01-01 RX ADMIN — METOPROLOL SUCCINATE 50 MG: 50 TABLET, EXTENDED RELEASE ORAL at 12:36

## 2021-01-01 RX ADMIN — FERROUS SULFATE TAB 325 MG (65 MG ELEMENTAL FE) 325 MG: 325 (65 FE) TAB at 22:02

## 2021-01-01 RX ADMIN — PANTOPRAZOLE SODIUM 20 MG: 20 TABLET, DELAYED RELEASE ORAL at 21:36

## 2021-01-01 RX ADMIN — Medication 300 MG: at 10:22

## 2021-01-01 RX ADMIN — ANTI-FUNGAL POWDER MICONAZOLE NITRATE TALC FREE: 1.42 POWDER TOPICAL at 21:37

## 2021-01-01 RX ADMIN — SODIUM CHLORIDE: 9 INJECTION, SOLUTION INTRAVENOUS at 05:57

## 2021-01-01 RX ADMIN — DOXYCYCLINE HYCLATE 100 MG: 100 CAPSULE ORAL at 10:21

## 2021-01-01 RX ADMIN — RISPERIDONE 0.5 MG: 0.5 TABLET, FILM COATED ORAL at 20:04

## 2021-01-01 RX ADMIN — CEFEPIME HYDROCHLORIDE 2000 MG: 2 INJECTION, POWDER, FOR SOLUTION INTRAVENOUS at 04:57

## 2021-01-01 RX ADMIN — FERROUS SULFATE TAB 325 MG (65 MG ELEMENTAL FE) 325 MG: 325 (65 FE) TAB at 11:10

## 2021-01-01 RX ADMIN — WATER 10 ML: 1 INJECTION INTRAMUSCULAR; INTRAVENOUS; SUBCUTANEOUS at 14:06

## 2021-01-01 RX ADMIN — MEROPENEM 1000 MG: 1 INJECTION, POWDER, FOR SOLUTION INTRAVENOUS at 12:42

## 2021-01-01 RX ADMIN — METOPROLOL SUCCINATE 50 MG: 50 TABLET, EXTENDED RELEASE ORAL at 10:00

## 2021-01-01 RX ADMIN — RISPERIDONE 0.5 MG: 0.5 TABLET, FILM COATED ORAL at 21:50

## 2021-01-01 RX ADMIN — RISPERIDONE 0.5 MG: 0.5 TABLET, FILM COATED ORAL at 10:22

## 2021-01-01 RX ADMIN — IPRATROPIUM BROMIDE AND ALBUTEROL SULFATE 1 AMPULE: .5; 3 SOLUTION RESPIRATORY (INHALATION) at 12:17

## 2021-01-01 RX ADMIN — METRONIDAZOLE 500 MG: 500 INJECTION, SOLUTION INTRAVENOUS at 05:16

## 2021-01-01 RX ADMIN — Medication 300 MG: at 23:13

## 2021-01-01 RX ADMIN — IOPAMIDOL 90 ML: 755 INJECTION, SOLUTION INTRAVENOUS at 11:24

## 2021-01-01 RX ADMIN — Medication 10 ML: at 21:07

## 2021-01-01 RX ADMIN — MEMANTINE HYDROCHLORIDE 10 MG: 10 TABLET, FILM COATED ORAL at 09:23

## 2021-01-01 RX ADMIN — ENOXAPARIN SODIUM 40 MG: 40 INJECTION SUBCUTANEOUS at 10:22

## 2021-01-01 RX ADMIN — PANTOPRAZOLE SODIUM 40 MG: 40 TABLET, DELAYED RELEASE ORAL at 05:08

## 2021-01-01 RX ADMIN — Medication 300 MG: at 20:04

## 2021-01-01 RX ADMIN — DOCUSATE SODIUM 100 MG: 100 CAPSULE, LIQUID FILLED ORAL at 09:23

## 2021-01-01 RX ADMIN — IPRATROPIUM BROMIDE AND ALBUTEROL SULFATE 1 AMPULE: .5; 3 SOLUTION RESPIRATORY (INHALATION) at 08:33

## 2021-01-01 RX ADMIN — METOPROLOL SUCCINATE 50 MG: 50 TABLET, EXTENDED RELEASE ORAL at 10:22

## 2021-01-01 RX ADMIN — MEROPENEM 1000 MG: 1 INJECTION, POWDER, FOR SOLUTION INTRAVENOUS at 02:18

## 2021-01-01 RX ADMIN — Medication 10 ML: at 12:03

## 2021-01-01 RX ADMIN — RISPERIDONE 0.5 MG: 0.5 TABLET, FILM COATED ORAL at 09:17

## 2021-01-01 RX ADMIN — FERROUS SULFATE TAB 325 MG (65 MG ELEMENTAL FE) 325 MG: 325 (65 FE) TAB at 09:22

## 2021-01-01 RX ADMIN — ANTI-FUNGAL POWDER MICONAZOLE NITRATE TALC FREE: 1.42 POWDER TOPICAL at 20:05

## 2021-01-01 RX ADMIN — Medication 10 ML: at 10:00

## 2021-01-01 RX ADMIN — RISPERIDONE 0.5 MG: 0.5 TABLET, FILM COATED ORAL at 10:03

## 2021-01-01 RX ADMIN — PANTOPRAZOLE SODIUM 20 MG: 20 TABLET, DELAYED RELEASE ORAL at 09:22

## 2021-01-01 RX ADMIN — ANTI-FUNGAL POWDER MICONAZOLE NITRATE TALC FREE: 1.42 POWDER TOPICAL at 10:00

## 2021-01-01 RX ADMIN — Medication 1 TABLET: at 22:46

## 2021-01-01 RX ADMIN — Medication 1 TABLET: at 09:24

## 2021-01-01 RX ADMIN — RISPERIDONE 1 MG: 1 TABLET, FILM COATED ORAL at 10:33

## 2021-01-01 RX ADMIN — Medication 300 MG: at 21:03

## 2021-01-01 RX ADMIN — PANTOPRAZOLE SODIUM 20 MG: 20 TABLET, DELAYED RELEASE ORAL at 12:36

## 2021-01-01 RX ADMIN — LEVOTHYROXINE SODIUM 50 MCG: 0.1 TABLET ORAL at 04:56

## 2021-01-01 RX ADMIN — METRONIDAZOLE 500 MG: 500 INJECTION, SOLUTION INTRAVENOUS at 22:02

## 2021-01-01 RX ADMIN — MEMANTINE HYDROCHLORIDE 10 MG: 10 TABLET, FILM COATED ORAL at 21:07

## 2021-01-01 RX ADMIN — FERROUS SULFATE TAB 325 MG (65 MG ELEMENTAL FE) 325 MG: 325 (65 FE) TAB at 11:23

## 2021-01-01 RX ADMIN — METOPROLOL SUCCINATE 50 MG: 50 TABLET, EXTENDED RELEASE ORAL at 09:10

## 2021-01-01 RX ADMIN — MEROPENEM 1000 MG: 1 INJECTION, POWDER, FOR SOLUTION INTRAVENOUS at 12:03

## 2021-01-01 RX ADMIN — MEMANTINE HYDROCHLORIDE 10 MG: 10 TABLET, FILM COATED ORAL at 22:02

## 2021-01-01 RX ADMIN — IPRATROPIUM BROMIDE AND ALBUTEROL SULFATE 1 AMPULE: .5; 3 SOLUTION RESPIRATORY (INHALATION) at 08:03

## 2021-01-01 RX ADMIN — MEMANTINE HYDROCHLORIDE 10 MG: 10 TABLET, FILM COATED ORAL at 11:23

## 2021-01-01 RX ADMIN — IPRATROPIUM BROMIDE AND ALBUTEROL SULFATE 1 AMPULE: .5; 3 SOLUTION RESPIRATORY (INHALATION) at 20:21

## 2021-01-01 RX ADMIN — IPRATROPIUM BROMIDE AND ALBUTEROL SULFATE 1 AMPULE: .5; 3 SOLUTION RESPIRATORY (INHALATION) at 15:52

## 2021-01-01 RX ADMIN — ANTI-FUNGAL POWDER MICONAZOLE NITRATE TALC FREE: 1.42 POWDER TOPICAL at 10:22

## 2021-01-01 RX ADMIN — MEMANTINE HYDROCHLORIDE 10 MG: 10 TABLET, FILM COATED ORAL at 10:33

## 2021-01-01 RX ADMIN — Medication 1 TABLET: at 11:23

## 2021-01-01 RX ADMIN — SODIUM CHLORIDE: 9 INJECTION, SOLUTION INTRAVENOUS at 22:53

## 2021-01-01 RX ADMIN — Medication 10 ML: at 23:15

## 2021-01-01 RX ADMIN — CEFEPIME HYDROCHLORIDE 2000 MG: 2 INJECTION, POWDER, FOR SOLUTION INTRAVENOUS at 23:05

## 2021-01-01 RX ADMIN — MEROPENEM 1000 MG: 1 INJECTION, POWDER, FOR SOLUTION INTRAVENOUS at 00:46

## 2021-01-01 RX ADMIN — AMOXICILLIN AND CLAVULANATE POTASSIUM 1 TABLET: 875; 125 TABLET, FILM COATED ORAL at 10:21

## 2021-01-01 RX ADMIN — MEMANTINE HYDROCHLORIDE 10 MG: 10 TABLET, FILM COATED ORAL at 12:35

## 2021-01-01 RX ADMIN — Medication 10 ML: at 21:00

## 2021-01-01 RX ADMIN — SODIUM CHLORIDE 1000 ML: 9 INJECTION, SOLUTION INTRAVENOUS at 08:00

## 2021-01-01 RX ADMIN — ANTI-FUNGAL POWDER MICONAZOLE NITRATE TALC FREE: 1.42 POWDER TOPICAL at 10:04

## 2021-01-01 RX ADMIN — Medication 10 ML: at 20:04

## 2021-01-01 RX ADMIN — ANTI-FUNGAL POWDER MICONAZOLE NITRATE TALC FREE: 1.42 POWDER TOPICAL at 23:14

## 2021-01-01 RX ADMIN — IPRATROPIUM BROMIDE AND ALBUTEROL SULFATE 1 AMPULE: .5; 3 SOLUTION RESPIRATORY (INHALATION) at 16:27

## 2021-01-01 RX ADMIN — PANTOPRAZOLE SODIUM 40 MG: 40 TABLET, DELAYED RELEASE ORAL at 06:45

## 2021-01-01 RX ADMIN — ENOXAPARIN SODIUM 40 MG: 40 INJECTION SUBCUTANEOUS at 09:17

## 2021-01-01 RX ADMIN — METOPROLOL SUCCINATE 50 MG: 50 TABLET, EXTENDED RELEASE ORAL at 09:17

## 2021-01-01 RX ADMIN — Medication 10 ML: at 22:53

## 2021-01-01 RX ADMIN — METRONIDAZOLE 500 MG: 500 INJECTION, SOLUTION INTRAVENOUS at 04:55

## 2021-01-01 RX ADMIN — Medication 1 TABLET: at 10:33

## 2021-01-01 RX ADMIN — PANTOPRAZOLE SODIUM 20 MG: 20 TABLET, DELAYED RELEASE ORAL at 11:10

## 2021-01-01 RX ADMIN — Medication 300 MG: at 21:36

## 2021-01-01 RX ADMIN — METOPROLOL SUCCINATE 50 MG: 50 TABLET, EXTENDED RELEASE ORAL at 11:13

## 2021-01-01 RX ADMIN — IPRATROPIUM BROMIDE AND ALBUTEROL SULFATE 1 AMPULE: .5; 3 SOLUTION RESPIRATORY (INHALATION) at 10:02

## 2021-01-01 RX ADMIN — CEFTRIAXONE 1000 MG: 1 INJECTION, POWDER, FOR SOLUTION INTRAMUSCULAR; INTRAVENOUS at 13:14

## 2021-01-01 RX ADMIN — Medication: at 10:04

## 2021-01-01 RX ADMIN — LIDOCAINE HYDROCHLORIDE AND EPINEPHRINE 20 ML: 10; 10 INJECTION, SOLUTION INFILTRATION; PERINEURAL at 17:59

## 2021-01-01 RX ADMIN — SODIUM CHLORIDE: 9 INJECTION, SOLUTION INTRAVENOUS at 20:31

## 2021-01-01 RX ADMIN — PANTOPRAZOLE SODIUM 40 MG: 40 TABLET, DELAYED RELEASE ORAL at 11:13

## 2021-01-01 RX ADMIN — ENOXAPARIN SODIUM 40 MG: 40 INJECTION SUBCUTANEOUS at 11:10

## 2021-01-01 RX ADMIN — Medication 300 MG: at 10:00

## 2021-01-01 RX ADMIN — METRONIDAZOLE 500 MG: 500 INJECTION, SOLUTION INTRAVENOUS at 13:49

## 2021-01-01 RX ADMIN — Medication 300 MG: at 11:13

## 2021-01-01 RX ADMIN — Medication 10 ML: at 11:11

## 2021-01-01 RX ADMIN — RISPERIDONE 1 MG: 1 TABLET, FILM COATED ORAL at 09:24

## 2021-01-01 RX ADMIN — ANTI-FUNGAL POWDER MICONAZOLE NITRATE TALC FREE: 1.42 POWDER TOPICAL at 09:11

## 2021-01-01 RX ADMIN — RISPERIDONE 1 MG: 1 TABLET, FILM COATED ORAL at 11:08

## 2021-01-01 RX ADMIN — SODIUM CHLORIDE: 9 INJECTION, SOLUTION INTRAVENOUS at 15:02

## 2021-01-01 RX ADMIN — DOCUSATE SODIUM 100 MG: 100 CAPSULE, LIQUID FILLED ORAL at 11:10

## 2021-01-01 RX ADMIN — LEVOTHYROXINE SODIUM 50 MCG: 0.1 TABLET ORAL at 05:16

## 2021-01-01 RX ADMIN — METOPROLOL SUCCINATE 50 MG: 50 TABLET, EXTENDED RELEASE ORAL at 09:22

## 2021-01-01 RX ADMIN — PANTOPRAZOLE SODIUM 40 MG: 40 TABLET, DELAYED RELEASE ORAL at 05:44

## 2021-01-01 RX ADMIN — MEMANTINE HYDROCHLORIDE 10 MG: 10 TABLET, FILM COATED ORAL at 11:10

## 2021-01-01 RX ADMIN — METRONIDAZOLE 500 MG: 500 INJECTION, SOLUTION INTRAVENOUS at 15:01

## 2021-01-01 RX ADMIN — MEMANTINE HYDROCHLORIDE 10 MG: 10 TABLET, FILM COATED ORAL at 22:14

## 2021-01-01 RX ADMIN — Medication 1 TABLET: at 12:35

## 2021-01-01 RX ADMIN — SODIUM CHLORIDE: 9 INJECTION, SOLUTION INTRAVENOUS at 17:17

## 2021-01-01 RX ADMIN — DOCUSATE SODIUM 100 MG: 100 CAPSULE, LIQUID FILLED ORAL at 12:35

## 2021-01-01 RX ADMIN — Medication 300 MG: at 10:04

## 2021-01-01 RX ADMIN — SODIUM CHLORIDE: 9 INJECTION, SOLUTION INTRAVENOUS at 21:30

## 2021-01-01 RX ADMIN — MEROPENEM 1000 MG: 1 INJECTION, POWDER, FOR SOLUTION INTRAVENOUS at 14:25

## 2021-01-01 RX ADMIN — FERROUS SULFATE TAB 325 MG (65 MG ELEMENTAL FE) 325 MG: 325 (65 FE) TAB at 21:36

## 2021-01-01 RX ADMIN — RISPERIDONE 0.5 MG: 0.5 TABLET, FILM COATED ORAL at 11:13

## 2021-01-01 RX ADMIN — Medication 10 ML: at 11:23

## 2021-01-01 RX ADMIN — ENOXAPARIN SODIUM 40 MG: 40 INJECTION SUBCUTANEOUS at 09:10

## 2021-01-01 RX ADMIN — SODIUM CHLORIDE: 9 INJECTION, SOLUTION INTRAVENOUS at 15:22

## 2021-01-01 RX ADMIN — IPRATROPIUM BROMIDE AND ALBUTEROL SULFATE 1 AMPULE: .5; 3 SOLUTION RESPIRATORY (INHALATION) at 21:48

## 2021-01-01 RX ADMIN — ZIPRASIDONE MESYLATE 10 MG: 20 INJECTION, POWDER, LYOPHILIZED, FOR SOLUTION INTRAMUSCULAR at 14:06

## 2021-01-01 RX ADMIN — ENOXAPARIN SODIUM 40 MG: 40 INJECTION SUBCUTANEOUS at 12:36

## 2021-01-01 RX ADMIN — RISPERIDONE 1 MG: 1 TABLET, FILM COATED ORAL at 12:35

## 2021-01-01 RX ADMIN — BARIUM SULFATE 15 ML: 400 SUSPENSION ORAL at 16:34

## 2021-01-01 RX ADMIN — ENOXAPARIN SODIUM 40 MG: 40 INJECTION SUBCUTANEOUS at 09:23

## 2021-01-01 RX ADMIN — METRONIDAZOLE 500 MG: 500 INJECTION, SOLUTION INTRAVENOUS at 21:30

## 2021-01-01 RX ADMIN — LEVOTHYROXINE SODIUM 50 MCG: 0.1 TABLET ORAL at 05:46

## 2021-01-01 RX ADMIN — DOXYCYCLINE HYCLATE 100 MG: 100 CAPSULE ORAL at 21:30

## 2021-01-01 RX ADMIN — Medication 300 MG: at 21:50

## 2021-01-01 RX ADMIN — RISPERIDONE 0.5 MG: 0.5 TABLET, FILM COATED ORAL at 21:03

## 2021-01-01 RX ADMIN — ERGOCALCIFEROL 50000 UNITS: 1.25 CAPSULE ORAL at 09:24

## 2021-01-01 RX ADMIN — CEFTRIAXONE 1000 MG: 1 INJECTION, POWDER, FOR SOLUTION INTRAMUSCULAR; INTRAVENOUS at 09:32

## 2021-01-01 RX ADMIN — IPRATROPIUM BROMIDE AND ALBUTEROL SULFATE 1 AMPULE: .5; 3 SOLUTION RESPIRATORY (INHALATION) at 20:28

## 2021-01-01 RX ADMIN — Medication 300 MG: at 09:17

## 2021-01-01 RX ADMIN — RISPERIDONE 0.5 MG: 0.5 TABLET, FILM COATED ORAL at 09:10

## 2021-01-01 RX ADMIN — ENOXAPARIN SODIUM 40 MG: 40 INJECTION SUBCUTANEOUS at 10:02

## 2021-01-01 RX ADMIN — Medication 10 ML: at 09:23

## 2021-01-01 RX ADMIN — MEMANTINE HYDROCHLORIDE 10 MG: 10 TABLET, FILM COATED ORAL at 21:36

## 2021-01-01 RX ADMIN — IPRATROPIUM BROMIDE AND ALBUTEROL SULFATE 1 AMPULE: .5; 3 SOLUTION RESPIRATORY (INHALATION) at 13:12

## 2021-01-01 RX ADMIN — ANTI-FUNGAL POWDER MICONAZOLE NITRATE TALC FREE: 1.42 POWDER TOPICAL at 21:04

## 2021-01-01 RX ADMIN — FERROUS SULFATE TAB 325 MG (65 MG ELEMENTAL FE) 325 MG: 325 (65 FE) TAB at 12:36

## 2021-01-01 RX ADMIN — RISPERIDONE 1 MG: 1 TABLET, FILM COATED ORAL at 22:02

## 2021-01-01 RX ADMIN — POTASSIUM CHLORIDE 40 MEQ: 1500 TABLET, EXTENDED RELEASE ORAL at 10:21

## 2021-01-01 RX ADMIN — MEROPENEM 1000 MG: 1 INJECTION, POWDER, FOR SOLUTION INTRAVENOUS at 11:41

## 2021-01-01 RX ADMIN — RISPERIDONE 1 MG: 1 TABLET, FILM COATED ORAL at 22:14

## 2021-01-01 RX ADMIN — RISPERIDONE 1 MG: 1 TABLET, FILM COATED ORAL at 21:07

## 2021-01-01 RX ADMIN — FERROUS SULFATE TAB 325 MG (65 MG ELEMENTAL FE) 325 MG: 325 (65 FE) TAB at 22:14

## 2021-01-01 RX ADMIN — RISPERIDONE 0.5 MG: 0.5 TABLET, FILM COATED ORAL at 23:14

## 2021-01-01 RX ADMIN — PANTOPRAZOLE SODIUM 40 MG: 40 TABLET, DELAYED RELEASE ORAL at 10:00

## 2021-01-01 RX ADMIN — RISPERIDONE 0.5 MG: 0.5 TABLET, FILM COATED ORAL at 10:00

## 2021-01-01 RX ADMIN — IPRATROPIUM BROMIDE AND ALBUTEROL SULFATE 1 AMPULE: .5; 3 SOLUTION RESPIRATORY (INHALATION) at 20:08

## 2021-01-01 RX ADMIN — ENOXAPARIN SODIUM 40 MG: 40 INJECTION SUBCUTANEOUS at 11:14

## 2021-01-01 RX ADMIN — METOPROLOL SUCCINATE 50 MG: 50 TABLET, EXTENDED RELEASE ORAL at 11:10

## 2021-01-01 RX ADMIN — ENOXAPARIN SODIUM 40 MG: 40 INJECTION SUBCUTANEOUS at 11:23

## 2021-01-01 RX ADMIN — Medication: at 21:04

## 2021-01-01 RX ADMIN — CEFTRIAXONE 1000 MG: 1 INJECTION, POWDER, FOR SOLUTION INTRAMUSCULAR; INTRAVENOUS at 15:45

## 2021-01-01 RX ADMIN — IPRATROPIUM BROMIDE AND ALBUTEROL SULFATE 1 AMPULE: .5; 3 SOLUTION RESPIRATORY (INHALATION) at 20:38

## 2021-01-01 RX ADMIN — RISPERIDONE 1 MG: 1 TABLET, FILM COATED ORAL at 22:46

## 2021-01-01 RX ADMIN — ENOXAPARIN SODIUM 40 MG: 40 INJECTION SUBCUTANEOUS at 10:00

## 2021-01-01 RX ADMIN — Medication 10 ML: at 10:32

## 2021-01-01 RX ADMIN — Medication 10 ML: at 21:04

## 2021-01-01 RX ADMIN — METOPROLOL SUCCINATE 50 MG: 50 TABLET, EXTENDED RELEASE ORAL at 10:03

## 2021-01-01 RX ADMIN — Medication: at 14:00

## 2021-01-01 RX ADMIN — FERROUS SULFATE TAB 325 MG (65 MG ELEMENTAL FE) 325 MG: 325 (65 FE) TAB at 10:33

## 2021-01-01 RX ADMIN — Medication 300 MG: at 09:10

## 2021-01-01 RX ADMIN — FERROUS SULFATE TAB 325 MG (65 MG ELEMENTAL FE) 325 MG: 325 (65 FE) TAB at 21:07

## 2021-01-01 RX ADMIN — ENOXAPARIN SODIUM 40 MG: 40 INJECTION SUBCUTANEOUS at 10:32

## 2021-01-01 RX ADMIN — LEVOTHYROXINE SODIUM 50 MCG: 0.1 TABLET ORAL at 06:07

## 2021-01-01 RX ADMIN — Medication: at 10:22

## 2021-01-01 RX ADMIN — CEFEPIME HYDROCHLORIDE 2000 MG: 2 INJECTION, POWDER, FOR SOLUTION INTRAVENOUS at 15:02

## 2021-01-01 RX ADMIN — Medication 1 TABLET: at 11:10

## 2021-01-01 RX ADMIN — IPRATROPIUM BROMIDE AND ALBUTEROL SULFATE 1 AMPULE: .5; 3 SOLUTION RESPIRATORY (INHALATION) at 10:47

## 2021-01-01 RX ADMIN — RISPERIDONE 0.5 MG: 0.5 TABLET, FILM COATED ORAL at 21:36

## 2021-01-01 RX ADMIN — SODIUM CHLORIDE 500 ML: 9 INJECTION, SOLUTION INTRAVENOUS at 00:30

## 2021-01-01 RX ADMIN — RISPERIDONE 0.5 MG: 0.5 TABLET, FILM COATED ORAL at 22:52

## 2021-01-01 RX ADMIN — Medication 10 ML: at 09:11

## 2021-01-01 ASSESSMENT — PAIN SCALES - GENERAL
PAINLEVEL_OUTOF10: 0
PAINLEVEL_OUTOF10: 2
PAINLEVEL_OUTOF10: 0

## 2021-01-01 ASSESSMENT — PAIN SCALES - PAIN ASSESSMENT IN ADVANCED DEMENTIA (PAINAD)
NEGVOCALIZATION: 1
FACIALEXPRESSION: 0
FACIALEXPRESSION: 0
BODYLANGUAGE: 1
BREATHING: 1
CONSOLABILITY: 0
BODYLANGUAGE: 0
NEGVOCALIZATION: 0
FACIALEXPRESSION: 0
TOTALSCORE: 2
BODYLANGUAGE: 0
CONSOLABILITY: 0
BODYLANGUAGE: 0
NEGVOCALIZATION: 1
BODYLANGUAGE: 0
CONSOLABILITY: 0
CONSOLABILITY: 0
TOTALSCORE: 2
TOTALSCORE: 2
BREATHING: 0
NEGVOCALIZATION: 1
FACIALEXPRESSION: 0
BODYLANGUAGE: 0
FACIALEXPRESSION: 0
BODYLANGUAGE: 0
BREATHING: 1
CONSOLABILITY: 0
FACIALEXPRESSION: 0
BREATHING: 0
TOTALSCORE: 0
TOTALSCORE: 0
CONSOLABILITY: 0
TOTALSCORE: 2
BREATHING: 0
FACIALEXPRESSION: 0
TOTALSCORE: 2
BODYLANGUAGE: 0
NEGVOCALIZATION: 0

## 2021-01-01 NOTE — PROGRESS NOTES
Physician Progress Note      Marissa Melo  [facility] #:                  [id number]  :                       1932  ADMIT DATE:       2020 9:40 AM  DISCH DATE:        2020 1:17 PM  RESPONDING  PROVIDER #:        Saeid GUNN DO          QUERY TEXT:    Patient admitted with AMS. Noted documentation of sepsis 2/2 to uti in H/P   . Please indicate one of the following and document in the medical   record: The medical record reflects the following:  Risk Factors: dementia, anemia , htn, age , snf resident  Clinical Indicators: WBC 10.8-8.1/neutrophils 65-62, -76, T 97-98.6, rr   30-16 room air sat 96%, urine 100K e coli-no hypotension, LA 2.0-2.4, Per h/p   sepsis 2/2 uti-acute delirium , ^ la, per  progress note sepsis 2/2   uti-dehydration-pt more alert  Treatment: ivf boluses, Rocephin, urine cx, labs tests and monitoring  Thank you, Raven Mohamud RN BSN [facility]  contact number [phone]  Options provided:  -- Sepsis present as evidenced by, Please document evidence.   -- Sepsis was ruled out after study  -- Other - I will add my own diagnosis  -- Disagree - Not applicable / Not valid  -- Disagree - Clinically unable to determine / Unknown  -- Refer to Clinical Documentation Reviewer    PROVIDER RESPONSE TEXT:    Sepsis is present as evidenced by acute delirium and UTI sepsis    Query created by: Alexx Grijalva on 2020 9:33 AM      Electronically signed by:  Valentin Pat DO 2020 7:42 PM

## 2021-02-27 PROBLEM — N39.0 UTI (URINARY TRACT INFECTION): Status: ACTIVE | Noted: 2021-01-01

## 2021-02-27 NOTE — ED PROVIDER NOTES
HPI:     Reyes Toledo is a 80 y.o. female presenting to the ED for altered mental status, beginning 1 day ago. The complaint has been persistent, moderate in severity, and worsened by nothing. Per nursing home, patient does have dementia at baseline but is able to carry on conversation knows who she is. Nursing home's nurse states that for the last 24 hours, patient has been more confused and she is not been responsive at all. She states that she did left-sided facial droop and she was concerned the patient was having a stroke so she sent her to the emergency room for further evaluation. Patient unable to provide any history secondary altered mental status    Review of Systems:   Unable to obtain secondary to altered mental status        --------------------------------------------- PAST HISTORY ---------------------------------------------  Past Medical History:  has a past medical history of LETHA (acute kidney injury) ([facility] [address] Bleeding ulcer, Blood circulation, collateral, GERD (gastroesophageal reflux disease), History of bleeding ulcers, Hypertension, Hyperuricemia, Metabolic acidosis, and PVD (peripheral vascular disease) ([facility] [address] Past Surgical History:  has a past surgical history that includes Hysterectomy (early 80's); Endoscopy, colon, diagnostic (colonoscopy); Tonsillectomy (as a child); Wrist fracture surgery (Right, as a child); fracture surgery (Right, 12/7/2013); Upper gastrointestinal endoscopy (N/A, 8/25/2020); and Upper gastrointestinal endoscopy (8/25/2020). Social History:  reports that she has never smoked. She has never used smokeless tobacco. She reports that she does not drink alcohol or use drugs. Family History: family history is not on file. The patients home medications have been reviewed. Allergies: Patient has no known allergies.     -------------------------------------------------- RESULTS -------------------------------------------------  All laboratory and radiology results have been personally reviewed by myself   LABS:  Results for orders placed or performed during the hospital encounter of 02/27/21   CBC auto differential   Result Value Ref Range    WBC 8.7 4.5 - 11.5 E9/L    RBC 3.42 (L) 3.50 - 5.50 E12/L    Hemoglobin 12.0 11.5 - 15.5 g/dL    Hematocrit 36.6 34.0 - 48.0 %    .0 (H) 80.0 - 99.9 fL    MCH 35.1 (H) 26.0 - 35.0 pg    MCHC 32.8 32.0 - 34.5 %    RDW 15.2 (H) 11.5 - 15.0 fL    Platelets 232 (H) 333 - 450 E9/L    MPV 9.9 7.0 - 12.0 fL    Neutrophils % 62.3 43.0 - 80.0 %    Immature Granulocytes % 0.6 0.0 - 5.0 %    Lymphocytes % 30.3 20.0 - 42.0 %    Monocytes % 5.3 2.0 - 12.0 %    Eosinophils % 1.2 0.0 - 6.0 %    Basophils % 0.3 0.0 - 2.0 %    Neutrophils Absolute 5.42 1.80 - 7.30 E9/L    Immature Granulocytes # 0.05 E9/L    Lymphocytes Absolute 2.63 1.50 - 4.00 E9/L    Monocytes Absolute 0.46 0.10 - 0.95 E9/L    Eosinophils Absolute 0.10 0.05 - 0.50 E9/L    Basophils Absolute 0.03 0.00 - 0.20 E9/L   Comprehensive Metabolic Panel   Result Value Ref Range    Sodium 135 132 - 146 mmol/L    Potassium 3.8 3.5 - 5.0 mmol/L    Chloride 94 (L) 98 - 107 mmol/L    CO2 33 (H) 22 - 29 mmol/L    Anion Gap 8 7 - 16 mmol/L    Glucose 97 74 - 99 mg/dL    BUN 47 (H) 8 - 23 mg/dL    CREATININE 0.7 0.5 - 1.0 mg/dL    GFR Non-African American >60 >=60 mL/min/1.73    GFR African American >60     Calcium 9.2 8.6 - 10.2 mg/dL    Total Protein 6.9 6.4 - 8.3 g/dL    Albumin 3.4 (L) 3.5 - 5.2 g/dL    Total Bilirubin 0.4 0.0 - 1.2 mg/dL    Alkaline Phosphatase 104 35 - 104 U/L    ALT 17 0 - 32 U/L    AST 23 0 - 31 U/L   Magnesium   Result Value Ref Range    Magnesium 2.3 1.6 - 2.6 mg/dL   Troponin   Result Value Ref Range    Troponin <0.01 0.00 - 0.03 ng/mL   Urinalysis with Microscopic   Result Value Ref Range    Color, UA Yellow Straw/Yellow    Clarity, UA CLOUDY (A) Clear    Glucose, Ur Negative Negative mg/dL    Bilirubin Urine Negative Negative    Ketones, Urine  ID [id number]     Lab [id number]     Critical(s) Notified . No Critical Values        RADIOLOGY:  Interpreted by Radiologist.  CT HEAD WO CONTRAST   Final Result   No acute intracranial hemorrhage or edema. CT CERVICAL SPINE WO CONTRAST   Final Result   No acute abnormality of the cervical spine. XR CHEST PORTABLE   Final Result   No pneumonia or pleural effusion.          ------------------------- NURSING NOTES AND VITALS REVIEWED ---------------------------   The nursing notes within the ED encounter and vital signs as below have been reviewed. BP (!) 108/48   Pulse 71   Temp 97.2 °F (36.2 °C) (Temporal)   Resp 18   Ht 5' 3\" (1.6 m)   SpO2 97%   BMI 30.65 kg/m²   Oxygen Saturation Interpretation: Normal      ---------------------------------------------------PHYSICAL EXAM--------------------------------------      Constitutional/General: Alert and oriented x0, ill appearing, non toxic in NAD  Head: Normocephalic and atraumatic  Eyes: PERRL, EOMI  Mouth: Oropharynx clear, handling secretions, no trismus  Neck: Supple, full ROM, phonation normal  Pulmonary: Lungs clear to auscultation bilaterally, no wheezes, rales, or rhonchi. Not in respiratory distress  Cardiovascular:  Regular rate and rhythm, no murmurs, gallops, or rubs. 2+ distal pulses  Abdomen: Soft, non tender, non distended,   Extremities: Moves all extremities x 4. Warm and well perfused  Skin: warm and dry without rash  Neurologic: No focal deficit noted, no facial droop noted  Glascow Coma Scale:  Best Eye Response 4 - Opens eyes on own   Best Verbal Response 3 - Talks, but nonsensical   Best Motor Response 5 - Pushes away noxious stimulus   Total Score 12   Psych: Normal Affect        EKG: This EKG is signed and interpreted by me.     Rate: 70  Rhythm: Sinus  Interpretation: Normal sinus rhythm, no acute changes noted, left axis deviation,  ms  Comparison: stable as compared to patient's most recent EKG    ------------------------------ ED COURSE/MEDICAL DECISION MAKING----------------------  Medications   haloperidol lactate (HALDOL) injection 5 mg (has no administration in time range)   cefTRIAXone (ROCEPHIN) 1,000 mg in sterile water 10 mL IV syringe (1,000 mg Intravenous New Bag 2/27/21 7223)         ED COURSE:       Medical Decision Making:    Patient presented to the ER today with chief complaint of altered mental status. There was concern for stroke from nursing, however patient has no focal deficit here in the ER. No acute changes noted on the CT. ABG is unremarkable. CBC within normal limits, drug screens are negative, urinalysis however is consistent with urinary tract infection. We will treat patient accordingly. As she is altered I will admit her for further evaluation. Did speak to Dr. Larissa Bardales who is agreeable to admission at this time. Counseling: The emergency provider has spoken with the patient and discussed todays results, in addition to providing specific details for the plan of care and counseling regarding the diagnosis and prognosis. Questions are answered at this time and they are agreeable with the plan.      --------------------------------- IMPRESSION AND DISPOSITION ---------------------------------    IMPRESSION  1. Urinary tract infection without hematuria, site unspecified    2. Altered mental status, unspecified altered mental status type        New Prescriptions    No medications on file       DISPOSITION  Disposition: Admit to telemetry  Patient condition is stable      NOTE: This report was transcribed using voice recognition software.  Every effort was made to ensure accuracy; however, inadvertent computerized transcription errors may be present       Willard Peña DO  Resident  02/27/21 5118

## 2021-02-28 NOTE — PROGRESS NOTES
HOSPITALIST PROGRESS NOTE  Date: 2/28/2021   Name: Marion Fernández   MRN: [id number]   YOB: 1932        Hospital Course: Inés Houston is 80 YF from a nursing facility presented to the emergency department due to altered mental status probably due to UTI patient also has a past medical history of dementia, peripheral vascular disease, osteoarthritis, hypertension, duodenal ulcer, anemia and an acute kidney injury.    Subjective/Interval Hx:   Patient confused due to dementia    Objective:   Physical Exam:   /60   Pulse 87   Temp 96.9 °F (36.1 °C) (Temporal)   Resp 16   Ht 5' 3\" (1.6 m)   Wt 169 lb (76.7 kg)   SpO2 94%   BMI 29.94 kg/m²   General: no acute distress, well nourished and well hydrated  HEENT: NCAT  Heart: S1S2 RRR  Lungs: Clear to ascultation bilaterally, respiratory effort normal  Abdomen: soft, NT/ND, positive bowel sounds  Extremities: no pitting edema, nontender   Neuro: patient is awake, alert and orientated times 3, no gross deficits  Skin: no rashes or ecchymosis        Meds:   Meds:    haloperidol lactate  5 mg Intramuscular Once    metoprolol succinate  50 mg Oral Daily    pantoprazole  40 mg Oral QAM AC    risperiDONE  0.5 mg Oral BID    rivastigmine  1 patch Transdermal Daily    [START ON 3/3/2021] vitamin D  50,000 Units Oral Q14 Days    sodium chloride flush  10 mL Intravenous 2 times per day    enoxaparin  40 mg Subcutaneous Daily    cefTRIAXone (ROCEPHIN) IV  1,000 mg Intravenous Q24H    ferrous sulfate  300 mg Oral BID      Infusions:    sodium chloride 75 mL/hr at 02/28/21 1138     PRN Meds:     sodium chloride flush, 10 mL, PRN      promethazine, 12.5 mg, Q6H PRN    Or      ondansetron, 4 mg, Q6H PRN      acetaminophen, 650 mg, Q6H PRN    Or      acetaminophen, 650 mg, Q6H PRN      polyethylene glycol, 17 g, Daily PRN        Data/Labs:     Recent Labs     02/27/21  1156 02/28/21  0538   WBC 8.7 6.6   HGB 12.0 11.6   HCT 36.6 34.3   * 432      Recent Labs     02/27/21  1156 02/28/21  0538    136   K 3.8 3.5   CL 94* 96*   CO2 33* 31*   BUN 47* 42*   CREATININE 0.7 0.7     Recent Labs     02/27/21  1156 02/28/21  0538   AST 23 18   ALT 17 14   BILITOT 0.4 0.3   ALKPHOS 104 98     No results for input(s): INR in the last 72 hours. No results for input(s): CKTOTAL, CKMB, CKMBINDEX, TROPONINT in the last 72 hours. I/O last 3 completed shifts: In: 425 [I.V.:425]  Out: -     Intake/Output Summary (Last 24 hours) at 2/28/2021 1208  Last data filed at 2/28/2021 0531  Gross per 24 hour   Intake 425 ml   Output --   Net 425 ml        Assessment/Plan:   1. Acute metabolic encephalopathy due to altered mental status probably from a urinary tract infection-we will obtain a urine culture and treat with Rocephin 1 g daily maintain hydration with IV fluids x24 hours and will continue to monitor  02/28/2021- patient remains confused probably due to dementia, will continue to monitor  2. Iron deficiency anemia-ferrous sulfate twice daily  3. Hypertension-metoprolol, monitor blood pressure  4. GERD-Protonix  1.  Chronic dementia-Risperdal      DVT Prophylaxis: lovenox  Diet: Diet NPO Effective Now  Code Status: DNR-CCA    Dispo: when stable     Electronically signed by Porfirio Toro MD on 2/28/2021 at 12:08 PM  [facility] Hospitalist

## 2021-02-28 NOTE — PROGRESS NOTES
Left message with JAIMEE ID regarding new consult, Dr. Dina Cooper added to care team.    Shania Holloway RN

## 2021-03-01 NOTE — CARE COORDINATION
3/1/21 Transition of Care: Patient is from [facility]. Message sent to [facility] to check on return. PT/OT pending. Will await response on return to [facility].  Donna Jenkins RN CM

## 2021-03-01 NOTE — PROGRESS NOTES
Patient experiencing visual and auditory hallucinations. Talking to unseen others and sees someone behind her bed. Oriented to self only. Frequently asks to be taken back to her dining room or \"room 28A. \"    Electronically signed by Carol Ann Deras RN on 3/1/2021 at 11:39 AM

## 2021-03-01 NOTE — CARE COORDINATION
3/1/21 Update CM Note; Patient is a private pay bed hold and will need precert from [facility] in order to return. PT.OT pending. Will follow. Envelope competed and placed in soft chart.  Brayden Alvarado RN CM

## 2021-03-01 NOTE — CONSULTS
Comprehensive Nutrition Assessment    Type and Reason for Visit:  Initial, Positive Nutrition Screen, Consult    Nutrition Recommendations/Plan: Continue diet as tolerated    Start supplemental EN. Recommend Standard w/ Fiber @ 45 ml/hr + 1 protein modular daily to provide 1080 ml tv, 1296 kcal, 60 gm pro, 872 ml free water (1396 kcal, 86 gm pro w/ daily pro mod)    Nutrition Assessment:  Pt nutritionally at risk w/ decreased PO intake, noted EN + pleasure feeds at [facility] 2/2 dementia. Pt w/ open wound to back. Will provide EN recs and monitor    Malnutrition Assessment:  Malnutrition Status: At risk for malnutrition (Comment)    Context:  Acute Illness     Findings of the 6 clinical characteristics of malnutrition:  Energy Intake:  Mild decrease in energy intake (Comment)  Weight Loss:  No significant weight loss     Body Fat Loss:  No significant body fat loss     Muscle Mass Loss:  No significant muscle mass loss    Fluid Accumulation:  No significant fluid accumulation     Strength:  Not Performed    Estimated Daily Nutrient Needs:  Energy (kcal):  ; Weight Used for Energy Requirements:  Current     Protein (g):  80-90; Weight Used for Protein Requirements:  Ideal(1.5-1.8)        Fluid (ml/day):  ; Method Used for Fluid Requirements:  1 ml/kcal      Nutrition Related Findings:  pt disoriented w/ underlying dementia, soft abd, active BS, PEG LUQ clamped, +1 edema, fluids WNL      Wounds:  Open Wounds, Wound Consult Pending       Current Nutrition Therapies:    DIET DENTAL SOFT;    Anthropometric Measures:  · Height: 5' 3\" (160 cm)  · Current Body Weight: 169 lb (76.7 kg)(bed scale 2/28)   · Usual Body Weight: 172 lb (78 kg)(actual per EMR 08/2020)     · Ideal Body Weight: 115 lbs; % Ideal Body Weight 147 %   · BMI: 29.9  · BMI Categories: Overweight (BMI 25.0-29. 9)       Nutrition Diagnosis:   · Inadequate oral intake related to cognitive or neurological impairment as evidenced by nutrition support - enteral nutrition, intake 0-25%, poor intake prior to admission      Nutrition Interventions:   Food and/or Nutrient Delivery:  Continue Current Diet, Start Tube Feeding(Standard w/ Fiber @ 45 ml/hr + 1 protein modular daily)  Nutrition Education/Counseling:  Education not appropriate   Coordination of Nutrition Care:  Continue to monitor while inpatient    Goals: Tolerance to TF at goal rate       Nutrition Monitoring and Evaluation:   Food/Nutrient Intake Outcomes:  Diet Advancement/Tolerance, Food and Nutrient Intake, Enteral Nutrition Intake/Tolerance  Physical Signs/Symptoms Outcomes:  Biochemical Data, GI Status, Fluid Status or Edema, Nutrition Focused Physical Findings, Skin, Weight     Discharge Planning:     Too soon to determine     Electronically signed by Ligia Chandler MS, RD, LD on 3/1/21 at 2:59 PM EST    Contact: [phone]

## 2021-03-02 NOTE — PROGRESS NOTES
OCCUPATIONAL THERAPY INITIAL EVALUATION      Date:3/2/2021  Patient Name: Carmelita Harrington  MRN: [id number]  : 1932  Room: [facility]/82-A    Modified Campbell Scale   Score     Description  0             No symptoms  1             No significant disability despite symptoms  2             Slight disability; able to look after own affairs  3             Moderate disability; able to ambulate without assist/ requires assist with ADLs  4             Moderate/Severe disability;requires assist to ambulate/assist with ADLs  5             Severe disability;bedridden/incontinent   6               Score:   5    Evaluating OT: MALLORY Grewal/L  Referring Provider: Porfirio Toro MD    AM-PAC Daily Activity Raw Score: 10/24  Recommended Adaptive Equipment: to be determined     Comments: Based on patient's functional performance as stated below and level of assistance needed prior to admission, this therapist believes that the patient would benefit from further skilled OT following hospital stay in an effort to increase safety, functional independence, and quality of life. Diagnosis: UTI (urinary tract infection) [N39.0]    Pertinent Medical History:   Past Medical History:   Diagnosis Date    LETHA [facility]acute kidney injury) ([facility] [address] 2020    Bleeding ulcer     Blood circulation, collateral pain to legs    GERD (gastroesophageal reflux disease)     History of bleeding ulcers     Hypertension     Hyperuricemia     Metabolic acidosis     PVD [facility]peripheral vascular disease) ([facility])        Precautions:  Falls, bed alarm     Home Living: Pt is poor historian- per chart, admitted from SNF    Prior Level of Function: Per pt reports- she is independent with ADLs and with IADLs; using ww for ambulation. Patient significantly plantar flexed in bed, indicating she has not been ambulatory, denies use of orthotic shoes or AFO braces, poor ability to dorsiflex.   Driving: no  Occupation: retired     Pain Level: denies pain  Cognition: A&O: to self and year only- states she is in [place]; Follows 1 step directions with increased time. Poor eye contact throughout session. Memory: P   Sequencing: P   Problem solving: P   Judgement/safety: P     Functional Assessment:   Initial Eval Status  Date: 3/2/21 Treatment Status  Date: STG=LTG (~5-14  days)     Feeding Min A  For utensil to mouth and cup to mouth; extensive time spent masticating, cues for swallowing     Set up A   Grooming Mod A  Seated, simulated    Min A while seated EOB    UB Dressing Max A  simulated    Mod A   LB Dressing DEP    Min A   Bathing DEP    Max A   Toileting DEP    Max A   Bed Mobility  Rolling: NT  Supine to sit: NT  Sit to supine: NT  Min A   Functional Transfers Sit to stand: NT  Stand to sit: NT     Functional Mobility NT     Balance Sitting:     Static:  NT    Dynamic:NT  Standing: NT  Min A   Endurance/Activity Tolerance Fair+ with light bed level activity  Good-   Visual/  Perceptual Glasses: readers   -poor eye contact throughout session, able to visually locate items on tray. Continue to assess           Hand dominance: R  UE ROM: RUE: Limited in extension at digits and limited in flexion at shoulder    LUE: WFL  Strength: RUE: grossly 2-/5 LUE: grossly 3+/5   Strength: WFL L, impaired R  Fine Motor Coordination: impaired R, WFL L    Hearing: WFL  Sensation: No c/o numbness or tingling  Tone: WNL  Edema: unremarkable                            Comments: Upon arrival patient supine with HOB slightly elevated. Bed mobility deferred for patient safety d/t habitus and likely PLOF as noted above. Bed level assessment completed. Feeding, UB assessment, cognition addressed. After session, patient long sitting with all devices within reach, all lines and tubes intact. Bed alarm on. Pt required cues and education as noted above for safe facilitation and completion of tasks.  Therapist provided skilled monitoring of patient's response during treatment session. Prior to and at the end of session, environmental modifications/line management completed for patients safety and efficiency of treatment session. Overall, patient demonstrates signficant difficulties with completion of BADLs and IADLs. Factors contributing to these difficulties include likely bed bound at baseline, impaired cognition and memory, RUE weakness, decreased endurance, and generalized weakness. As noted above, patient likely to benefit from further OT intervention to increase independence, safety, and overall quality of life. Treatment:  · ADL completion: Self-care retraining for the above-mentioned ADLs; training on proper hand placement, safety technique, sequencing, and energy conservation techniques. · Therapeutic Exercises: B UE AROM/AAROM completed at all levels to maintain strength/flexibility and to decrease edema/contractures to enhance ADL completion. · Skilled positioning: Proper positioning to improve interaction with environment, overall functioning and decrease/prevent edema and contractures. Eval Complexity:   · Medium Complexity  · History: Expanded review of medical records and additional review of physical, cognitive, or psychosocial history related to current functional performance  · Exam: 3+ performance deficits  · Assistance/Modification: Min/mod assistance or modifications required to perform tasks. May have comorbidities that affect occupational performance.     Assessment of current deficits   Functional mobility [x]  ADLs [x] Strength [x]  Cognition [x]  Functional transfers  [x] IADLs [x] Safety Awareness [x]  Endurance [x]  Fine Motor Coordination [x] Balance [x] Vision/perception [x] Sensation []   Gross Motor Coordination [] ROM [x] Delirium []                  Motor Control []    Plan of Care: 1-3 days/week for 1-2 weeks PRN   [x]ADL retraining/adapted techniques and AE recommendations to increase functional independence within precautions                    [x]Energy conservation techniques to improve tolerance for selfcare routine   [x]Functional transfer/mobility training/DME recommendations for increased independence, safety and fall prevention         [x]Patient/family education to increase safety and functional independence             [x]Environmental modifications for safe mobility and completion of ADLs                             []Cognitive retraining ex's to improve problem solving skills & safe participation in ADLs/IADLs     []Sensory re-education techniques to improve extremity awareness, maintain skin integrity and improve hand function                             []Visual/Perceptual retraining  to improve body awareness and safety during transfers and ADLs  []Splinting/positioning needs to maintain joint/skin integrity and prevent contractures  [x]Therapeutic activity to improve functional performance during ADLs. [x]Therapeutic exercise to improve tolerance and functional strength for ADLs   [x]Balance retraining/tolerance tasks for facilitation of postural control with dynamic challenges during ADLs . []Neuromuscular re-education: facilitation of righting/equilibrium reactions, midline orientation, scapular stability/mobility, Normalization muscle     tone and facilitation active functional movement/Attention                         []Delirium prevention/treatment    []Positioning to improve functional independence  []Other:       Rehab Potential: Good for established goals     Patient / Family Goal: None stated      Patient and/or family were instructed on functional diagnosis, prognosis/goals and OT plan of care. Demonstrated poor understanding.      Eval Complexity: Medium      Time In: 1010  Time Out: 1027  Total Time: 17 minutes    Min Units   OT Eval Low 57336       OT Eval Medium 97166  X 1   OT Eval High 08783       OT Re-Eval [id number]       Therapeutic Ex 28925       Therapeutic Activities 40316  8 1    ADL/Self Care 50100       Orthotic Management 23253       Neuro Re-Ed 75306       Non-Billable Time          Evaluation Time includes thorough review of current medical information, gathering information on past medical history/social history and prior level of function, completion of standardized testing/informal observation of tasks, assessment of data and education on plan of care and goals.     Keon Oh, OTR/L  [id number]

## 2021-03-02 NOTE — PROGRESS NOTES
HOSPITALIST PROGRESS NOTE  Date: 3/2/2021   Name: Eitan Brumfield   MRN: [id number]   YOB: 1932        Hospital Course: Fatimah Rosa is 80 YF from a nursing facility presented to the emergency department due to altered mental status probably due to UTI patient also has a past medical history of dementia, peripheral vascular disease, osteoarthritis, hypertension, duodenal ulcer, anemia and an acute kidney injury.    Subjective/Interval Hx:   Patient confused due to dementia    Objective:   Physical Exam:   /73   Pulse 85   Temp 97.5 °F (36.4 °C) (Temporal)   Resp 18   Ht 5' 3\" (1.6 m)   Wt 169 lb (76.7 kg)   SpO2 92%   BMI 29.94 kg/m²   General: no acute distress, well nourished and well hydrated  HEENT: NCAT  Heart: S1S2 RRR  Lungs: Clear to ascultation bilaterally, respiratory effort normal  Abdomen: soft, NT/ND, positive bowel sounds  Extremities: no pitting edema, nontender   Neuro: patient is awake, alert and orientated times 2, no gross deficits  Skin: no rashes or ecchymosis        Meds:   Meds:    miconazole   Topical BID    haloperidol lactate  5 mg Intramuscular Once    metoprolol succinate  50 mg Oral Daily    pantoprazole  40 mg Oral QAM AC    risperiDONE  0.5 mg Oral BID    rivastigmine  1 patch Transdermal Daily    [START ON 3/3/2021] vitamin D  50,000 Units Oral Q14 Days    sodium chloride flush  10 mL Intravenous 2 times per day    enoxaparin  40 mg Subcutaneous Daily    ferrous sulfate  300 mg Oral BID      Infusions:     PRN Meds:     mineral oil-hydrophil petrolat, , BID PRN      sodium chloride flush, 10 mL, PRN      promethazine, 12.5 mg, Q6H PRN    Or      ondansetron, 4 mg, Q6H PRN      acetaminophen, 650 mg, Q6H PRN    Or      acetaminophen, 650 mg, Q6H PRN      polyethylene glycol, 17 g, Daily PRN        Data/Labs:     Recent Labs     02/27/21  1156 02/28/21  0538   WBC 8.7 6.6   HGB 12.0 11.6   HCT 36.6 34.3   * 432      Recent Labs 02/27/21  1156 02/28/21  0538    136   K 3.8 3.5   CL 94* 96*   CO2 33* 31*   BUN 47* 42*   CREATININE 0.7 0.7     Recent Labs     02/27/21  1156 02/28/21  0538   AST 23 18   ALT 17 14   BILITOT 0.4 0.3   ALKPHOS 104 98     No results for input(s): INR in the last 72 hours. No results for input(s): CKTOTAL, CKMB, CKMBINDEX, TROPONINT in the last 72 hours. I/O last 3 completed shifts: In: 370 [P.O.:360; I.V.:10]  Out: 400 [Urine:400]    Intake/Output Summary (Last 24 hours) at 3/2/2021 0902  Last data filed at 3/2/2021 0525  Gross per 24 hour   Intake 250 ml   Output 400 ml   Net -150 ml        Assessment/Plan:   1. Acute metabolic encephalopathy    02/28/2021- patient remains confused probably due to dementia, will continue to monitor  03/01/2021-blood cultures are growing MRSA and gram-positive cocci in clusters ID thinks is probably a contaminant we will continue to monitor off antibiotics at this time and blood cultures are repeated  3/2/2021- no acute complaints, contacting snf to see what mental baseline is  2. Iron deficiency anemia-ferrous sulfate twice daily  3. Hypertension-metoprolol, monitor blood pressure  4. GERD-Protonix  5.  Chronic dementia-Risperdal      DVT Prophylaxis: lovenox  Diet: DIET DENTAL SOFT;  Code Status: DNR-CCA    Dispo: when stable     Electronically signed by Luisa Hillman, DO on 3/2/2021 at 9:02 AM  [facility]

## 2021-03-02 NOTE — PROGRESS NOTES
functional mobility as noted above. Pt pleasantly confused throughout session. She is able to follow simple commands but is poor historian. Pt with limited use of BUE and has BLE contractures. Pt was able to attempt to participate in bed mobility but unable to maintain static sitting. Pt returned to supine at end of session and left with all needs met and call light in reach. Pt would benefit from continued skilled PT intervention for the purposes of maximizing functional mobility and improving BLE ROM for improved overall QOL. Treatment:  Patient practiced and was instructed in the following treatment:     Therapeutic Activities Completed:  o Functional mobility as noted above:   - Bed mobility: dependent all aspects. Pt sat at EOB at [facility]. Pt retropulsive. Pt able to follow cues to increase trunk flexion to improve static seated balance but d/t BLE contractures was unable to maintain static sitting balance.  o Skilled repositioning in supine with HOB elevated for comfort. B PRAFO boots donned to promote improved B ankle ROM. o Pt education as noted above. Pt's/ family goals   1. Not stated. Patient and or family understand(s) diagnosis, prognosis, and plan of care. yes    PLAN OF CARE:    Current Treatment Recommendations     [x] Strengthening     [x] ROM   [x] Balance Training   [x] Endurance Training   [] Transfer Training   [] Gait Training   [] Stair Training   [x] Positioning   [x] Safety and Education Training   [x] Patient/Caregiver Education   [] HEP  [] Other     PT care will be provided in accordance with the objectives noted above. The above treatment recommendations will be utilized to address deficits described above in order to restore pt's prior level of function and/or achieve modified functional independence with adaptive strategies. Frequency of treatments: 2-5x/week x 1-2 weeks.     Time in  1036  Time out  1058    Total Treatment Time  10 minutes     Evaluation Time includes thorough review of current medical information, gathering information on past medical history/social history and prior level of function, completion of standardized testing/informal observation of tasks, assessment of data and education on plan of care and goals.     CPT codes:  [] Low Complexity PT evaluation 41399  [x] Moderate Complexity PT evaluation 62358  [] High Complexity PT evaluation 89943  [] PT Re-evaluation 60388  [] Gait training 26349 0 minutes  [] Manual therapy 72027 0 minutes  [x] Therapeutic activities 95891 10 minutes  [] Therapeutic exercises 88298 0 minutes  [] Neuromuscular reeducation 03406 0 minutes     Maddy Ward, PT, DPT  [id number]

## 2021-03-02 NOTE — PROGRESS NOTES
[facility] PROGRESS NOTE      Chief complaint: Follow-up of Staphylococcus epidermidis on blood cultures    The patient is a 80 y.o. female nursing home resident with history of hypertension, peripheral vascular disease, presented on 02/27 with confusion. On admission, she was afebrile and hemodynamically stable with no leukocytosis. Chest x-ray was unremarkable. CT head without contrast showed no acute intracranial hemorrhage or edema. Urinalysis showed pyuria of 10-20 WBCs. Blood cultures showed gram-positive cocci in clusters (methicillin-resistant Staphylococcus species not aureus by PCR). Ceftriaxone was started on admission. Subjective: Patient was seen and examined. No chills, no abdominal pain, no diarrhea, no rash, no itching. Objective:    Vitals:    03/02/21 0745   BP: 101/73   Pulse: 85   Resp: 18   Temp: 97.5 °F (36.4 °C)   SpO2: 92%     Constitutional: Alert, not in distress  Respiratory: Clear breath sounds, no crackles, no wheezes  Cardiovascular: Regular rate and rhythm, no murmurs  Gastrointestinal: Bowel sounds present, soft, nontender  Skin: Warm and dry, no active dermatoses  Musculoskeletal: No joint swelling, no joint erythema    Labs, imaging, and medical records/notes were personally reviewed. Assessment:  Asymptomatic bacteriuria  Staphylococcus epidermidis on blood cultures, deemed contaminant    Recommendations:  Monitor clinically off antibiotics for now. Follow up blood cultures from 02/28.     Thank you for involving me in the care of Vicki Perera. I will continue to follow. Please do not hesitate to call for any questions or concerns.     Electronically signed by Julieta Mendieta MD on 3/2/2021 at 10:47 AM

## 2021-03-03 NOTE — PROGRESS NOTES
Audible wheezing and use of accessory muscles were noted while bathing patient. This nurse Paged MD on call and got an order for albuterol treatments q6hr PRN. RT called. RT stated that she was in an RRT and will come after. RT arrived at bedside and stated that pt RR were in the 40 and needed Bipap. Pt sleeping at the time. Bipap was placed on pt. No respiratory distress noted. Pt woke up and became agitated and requested that bipap be removed. /73 100% 98.6 22 HR 77. Pt placed on 3L NC no distress noted pt being fed breakfast. No issues to report.

## 2021-03-03 NOTE — PROGRESS NOTES
Received message from [facility] via perfect [employer]. Patient has seen Dr. Kalyan Lovell. Call placed to office regading consult and aQsim Murphy notified via perfect serve.     Rich Hsu RN

## 2021-03-03 NOTE — PROGRESS NOTES
Hospitalist Progress Note      SYNOPSIS: Patient admitted on 2021 from a nursing facility presented to the emergency department due to altered mental status probably due to UTI patient also has a past medical history of dementia, peripheral vascular disease, osteoarthritis, hypertension, duodenal ulcer, anemia and an acute kidney injury. SUBJECTIVE:  Stable overnight. No other overnight issues reported. Patient seen and examined  Records reviewed. This morning she reported to be in respiratory distress and was placed on BiPAP with improvement, now BiPAP has been taken off    She has baseline dementia but seems more confused today, is repeatedly spelling words        Temp (24hrs), Av.1 °F (36.7 °C), Min:97.6 °F (36.4 °C), Max:98.6 °F (37 °C)    DIET: DIET DENTAL SOFT;  CODE: DNR-CCA    Intake/Output Summary (Last 24 hours) at 3/3/2021 1258  Last data filed at 3/3/2021 0858  Gross per 24 hour   Intake 120 ml   Output --   Net 120 ml       Review of Systems  All bolded are positive; please see HPI  General:  Fever, chills, diaphoresis, fatigue, malaise, night sweats, weight loss  Psychological:  Anxiety, disorientation, hallucinations. ENT:  Epistaxis, headaches, vertigo, visual changes. Cardiovascular:  Chest pain, irregular heartbeats, palpitations, paroxysmal nocturnal dyspnea. Respiratory:  Shortness of breath, coughing, sputum production, hemoptysis, wheezing, orthopnea.   Gastrointestinal:  Nausea, vomiting, diarrhea, heartburn, constipation, abdominal pain, hematemesis, hematochezia, melena, acholic stools  Genito-Urinary:  Dysuria, urgency, frequency, hematuria  Musculoskeletal:  Joint pain, joint stiffness, joint swelling, muscle pain  Neurology:  Headache, focal neurological deficits, weakness, numbness, paresthesia  Derm:  Rashes, ulcers, excoriations, bruising  Extremities:  Decreased ROM, peripheral edema, mottling      OBJECTIVE:    /73   Pulse 77   Temp 98.6 °F (37 °C) (Temporal)   Resp 22   Ht 5' 3\" (1.6 m)   Wt 169 lb (76.7 kg)   SpO2 95%   BMI 29.94 kg/m²     General appearance:  awake, alert, and oriented to person, place, time, and purpose; appears stated age and cooperative; no apparent distress no labored breathing 2L  HEENT:  Conjunctivae/corneas clear. Neck: Supple. No jugular venous distention. Respiratory: symmetrical; clear to auscultation bilaterally; no wheezes; no rhonchi; no rales  Cardiovascular: rhythm regular; rate controlled; no murmurs  Abdomen: Soft, nontender, nondistended  Extremities:  peripheral pulses present; no peripheral edema; no ulcers  Musculoskeletal: No clubbing, cyanosis, no bilateral lower extremity edema. Brisk capillary refill. Skin:  No rashes  on visible skin  Neurologic: awake, alert and following commands     ASSESSMENT and PLAN:  · Acute metabolic encephalopathy -blood cultures did grow staph but likely a contaminant. ID following off antibiotics. Patient seems more confused today than yesterday. Awaiting blood work and ABGs. · Acute hypoxic respiratory failure- CXR negative for acute process. Check ABGs.    · Iron deficiency anemia-ferrous sulfate twice daily  · Hypertension-metoprolol, monitor blood pressure  · GERD-Protonix  · Chronic dementia-Risperdal         DISPOSITION: Continue current plan of care    Medications:  REVIEWED DAILY    Infusion Medications   Scheduled Medications    mineral oil-hydrophil petrolat   Topical BID    miconazole   Topical BID    metoprolol succinate  50 mg Oral Daily    pantoprazole  40 mg Oral QAM AC    risperiDONE  0.5 mg Oral BID    rivastigmine  1 patch Transdermal Daily    vitamin D  50,000 Units Oral Q14 Days    sodium chloride flush  10 mL Intravenous 2 times per day    enoxaparin  40 mg Subcutaneous Daily    ferrous sulfate  300 mg Oral BID     PRN Meds: albuterol, mineral oil-hydrophil petrolat, sodium chloride flush, promethazine **OR** ondansetron, acetaminophen **OR**

## 2021-03-03 NOTE — CONSULTS
GENERAL SURGERY  CONSULT NOTE  3/3/2021    Physician Consulted: Dr. Venkat Thompson  Reason for Consult: I&D    CC: AMS    HPI  Cayla Mejia is a 80 y.o. female who presents for evaluation of AMS from SNF. Patient has a history of dementia and it is uncertain if she is worse than her baseline. CT of the head negative for acute process. Patient was growing bacteria from her urine, however does not report any pain or discomfort with urinating. Patient has been worked up for other sources of sepsis. Chest x-ray negative for pneumonia. Blood cultures did grow from 1 source corynebacterium which was believed to be contaminant. Patient does have a abscess on her upper back that is spontaneously draining purulent fluid. General surgery was consulted for possible I&D    Patient has been afebrile and without leukocytosis      Past Medical History:   Diagnosis Date    LETHA (acute kidney injury) ([facility] 8/18/2020    Bleeding ulcer 2008    Blood circulation, collateral pain to legs    GERD (gastroesophageal reflux disease)     History of bleeding ulcers     Hypertension     Hyperuricemia 4/47/3688    Metabolic acidosis 6/32/2823    PVD (peripheral vascular disease) ([facility]        Past Surgical History:   Procedure Laterality Date    ENDOSCOPY, COLON, DIAGNOSTIC  colonoscopy    FRACTURE SURGERY Right 12/7/2013    ORIF RIGHT FEMUR    HYSTERECTOMY  early 80's    TONSILLECTOMY  as a child    UPPER GASTROINTESTINAL ENDOSCOPY N/A 8/25/2020    EGD PEG [address] Ko [facility] [address]  8/25/2020    EGD BIOPSY performed by Ko Pulido MD at [address] [place] [address] Right as a child       Medications Prior to Admission:    Prior to Admission medications    Medication Sig Start Date End Date Taking? Authorizing Provider   miconazole (MICOTIN) 2 % powder Apply topically 2 times daily.  3/2/21  Yes Kierra Mendez, DO   mineral oil-hydrophilic petrolatum (HYDROPHOR) ointment Apply topically as needed. 12/31/20  Yes Roberto Hancock,    rivastigmine (EXELON) 4.6 MG/24HR Place 1 patch onto the skin daily   Yes Historical Provider, MD   ferrous sulfate (IRON 325) 325 (65 Fe) MG tablet Take 325 mg by mouth 2 times daily   Yes Historical Provider, MD   metoprolol succinate (TOPROL XL) 50 MG extended release tablet Take 50 mg by mouth daily   Yes Historical Provider, MD   polyethylene glycol (GLYCOLAX) 17 g packet Take 17 g by mouth daily as needed for Constipation   Yes Historical Provider, MD   potassium chloride (KLOR-CON M) 20 MEQ extended release tablet Take 20 mEq by mouth daily   Yes Historical Provider, MD   risperiDONE (RISPERDAL) 0.5 MG tablet Take 0.5 mg by mouth 2 times daily   Yes Historical Provider, MD   vitamin D (ERGOCALCIFEROL) 1.25 MG (54071 UT) CAPS capsule Take 50,000 Units by mouth every 14 days Given on the 3rd and the 17 th   Yes Historical Provider, MD   omeprazole (PRILOSEC) 20 MG capsule Take 20 mg by mouth daily. Yes Historical Provider, MD   acetaminophen (TYLENOL) 325 MG tablet Take 650 mg by mouth every 4 hours as needed for Pain or Fever Indications: Pain    Yes Historical Provider, MD       No Known Allergies    History reviewed. No pertinent family history. Social History     Tobacco Use    Smoking status: Never Smoker    Smokeless tobacco: Never Used   Substance Use Topics    Alcohol use: No    Drug use: No         Review of Systems   Unable to perform due to patient's altered mental status    PHYSICAL EXAM:    Vitals:    03/03/21 0839   BP:    Pulse:    Resp:    Temp:    SpO2: 95%       General Appearance:  awake, alert, not oriented, in no acute distress  Skin: Upper mid back subcutaneous abscess approximately 2 cm x 2 cm with overlying erythema. It is spontaneously draining purulent fluid. Head/face:  NCAT  Eyes:  No gross abnormalities.   Lungs:  Breathing Pattern: regular, no distress  Heart: Heart regular rate   Abdomen:  Soft, non-tender, non distended. No guarding or rigididty  Extremities: Extremities warm to touch, pink, with LE edema. LABS:    CBC  Recent Labs     03/03/21  1339   WBC 8.0   HGB 11.6   HCT 35.6        BMP  Recent Labs     03/03/21  1339      K 3.5   *   CO2 20*   BUN 26*   CREATININE 0.8   CALCIUM 9.5     Liver Function  Recent Labs     03/02/21  1104   BILITOT 0.4   AST 20   ALT 17   ALKPHOS 98   PROT 6.6   LABALBU 3.2*     No results for input(s): LACTATE in the last 72 hours. No results for input(s): INR, PTT in the last 72 hours. Invalid input(s): PT    RADIOLOGY    Ct Head Wo Contrast    Result Date: 2/27/2021  EXAMINATION: CT OF THE HEAD WITHOUT CONTRAST  2/27/2021 3:00 pm TECHNIQUE: CT of the head was performed without the administration of intravenous contrast. Dose modulation, iterative reconstruction, and/or weight based adjustment of the mA/kV was utilized to reduce the radiation dose to as low as reasonably achievable. COMPARISON: December 28, 2020. HISTORY: ORDERING SYSTEM PROVIDED HISTORY: ams TECHNOLOGIST PROVIDED HISTORY: Has a \"code stroke\" or \"stroke alert\" been called? ->No Reason for exam:->ams Decision Support Exception->Emergency Medical Condition (MA) What reading provider will be dictating this exam?->CRC FINDINGS: No evidence of acute intracranial hemorrhage or edema. No abnormal extra-axial fluid collections. There is prominence of sulci, cisterns and ventricles related age-appropriate parenchymal volume loss. Areas of hypoattenuation are seen in periventricular and subcortical white matter are suggestive of areas of chronic microvascular ischemia. No evidence of depressed calvarial fracture. No acute intracranial hemorrhage or edema.     Ct Cervical Spine Wo Contrast    Result Date: 2/27/2021  EXAMINATION: CT OF THE CERVICAL SPINE WITHOUT CONTRAST 2/27/2021 3:00 pm TECHNIQUE: CT of the cervical spine was performed without the administration of intravenous contrast. Multiplanar reformatted images are provided for review. Dose modulation, iterative reconstruction, and/or weight based adjustment of the mA/kV was utilized to reduce the radiation dose to as low as reasonably achievable. COMPARISON: None. HISTORY: ORDERING SYSTEM PROVIDED HISTORY: ams TECHNOLOGIST PROVIDED HISTORY: Reason for exam:->ams Decision Support Exception->Emergency Medical Condition (MA) What reading provider will be dictating this exam?->CRC FINDINGS: BONES/ALIGNMENT: There is no acute fracture or traumatic malalignment. DEGENERATIVE CHANGES: Moderate disc space narrowing at C4/C5 and C5/C6 with subchondral sclerosis and mild marginal osteophytosis. There is anterolisthesis of 3 mm of C3 on C4 which is likely degenerative given significant bilateral facet arthritis. Well corticated calcific density is seen posterior to left C1/C2 facet likely related to degenerative osteophytosis. SOFT TISSUES: There is no prevertebral soft tissue swelling. No acute abnormality of the cervical spine. Xr Chest Portable    Result Date: 2/27/2021  EXAMINATION: ONE XRAY VIEW OF THE CHEST 2/27/2021 12:55 pm COMPARISON: December 28, 2020 HISTORY: ORDERING SYSTEM PROVIDED HISTORY: Shortness of breath TECHNOLOGIST PROVIDED HISTORY: Reason for exam:->Shortness of breath What reading provider will be dictating this exam?->CRC FINDINGS: No airspace opacity or pleural effusion. The heart is normal size. No pneumothorax. No free air beneath the hemidiaphragms. No pneumonia or pleural effusion. ASSESSMENT:  80 y.o. female with mid upper back soft tissue abscess. History of dementia, believed to be altered from baseline. Asymptomatic bacteriuria.     PLAN:    -Do not believe this is a source of suspected sepsis or patient's altered mental status  -Patient has remained without leukocytosis and has been afebrile on this entire admission  -We will perform bedside I&D once consent has been obtained  -We will obtain cultures  -ABX per ID  -Overall care per primary    Plan discussed with Dr. Kalyan Lovell    Electronically signed by Qasim Murphy DO on 3/3/21 at 5:19 PM EST

## 2021-03-03 NOTE — PROCEDURES
Maria Guadalupe Mcclain is a 80 y.o. female patient. 1. Urinary tract infection without hematuria, site unspecified    2. Altered mental status, unspecified altered mental status type      Past Medical History:   Diagnosis Date    [facility]18/[facility]    Bleeding ulcer 2008    Blood circulation, collateral pain to legs    GERD (gastroesophageal reflux disease)     History of bleeding ulcers     Hypertension     Hyperuricemia 3/89/4229    Metabolic acidosis 0/98/7369    [facility]VD [facility]per[facility]      Blood pressure 123/73, pulse 77, temperature 98.6 °F (37 °C), temperature source Temporal, resp. rate 22, height 5' 3\" (1.6 m), weight 169 lb (76.7 kg), SpO2 95 %. Procedures    Incision and Drainage Procedure Note    Indication: Abscess    Procedure: The patient was positioned appropriately and the skin over the incision site was prepped with betadine and draped in a sterile fashion. Local anesthesia was obtained by infiltration using 1% Lidocaine with epinephrine. An incision was then made over the apex of the lesion and approximately 5 cc of bloody material was expressed. Wound and anaerobic cultures were obtained. Loculations were not present. The drainage cavity was then packed with sterile gauze and dressed with a sterile dressing. The patient tolerated the procedure well.     Complications: None    Dr. Yanira Blue was available for the entire procedure      Sergio Knowles DO  3/3/2021

## 2021-03-03 NOTE — PROGRESS NOTES
KENNEDY PROGRESS NOTE      Chief complaint: Follow-up of Staphylococcus epidermidis on blood cultures    The patient is a 80 y.o. female nursing home resident with history of hypertension, peripheral vascular disease, presented on 02/27 with confusion. On admission, she was afebrile and hemodynamically stable with no leukocytosis. Chest x-ray was unremarkable. CT head without contrast showed no acute intracranial hemorrhage or edema. Urinalysis showed pyuria of 10-20 WBCs. Blood cultures showed Staphylococcus epidermidis and hominis. Ceftriaxone was started on admission. Subjective: Patient was seen and examined. No chills, no abdominal pain, no diarrhea, no rash, no itching. Objective:  /73   Pulse 77   Temp 98.6 °F (37 °C) (Temporal)   Resp 22   Ht 5' 3\" (1.6 m)   Wt 169 lb (76.7 kg)   SpO2 95%   BMI 29.94 kg/m²   Constitutional: Alert, not in distress  Respiratory: Clear breath sounds, no crackles, no wheezes  Cardiovascular: Regular rate and rhythm, no murmurs  Gastrointestinal: Bowel sounds present, soft, nontender  Skin: Warm and dry, Pus expressed on upper back wound  Musculoskeletal: No joint swelling, no joint erythema    Labs, imaging, and medical records/notes were personally reviewed. Assessment:  Asymptomatic bacteriuria  Staphylococcus epidermidis/hominis on blood cultures, deemed contaminant  Abscess, upper back    Recommendations:  Send abscess fluid for culture. Consult General Surgery for possible I&D. [facility] Records Follow up blood cultures from 02/28.     Thank you for involving me in the care of Leonel De Anda. I will continue to follow. Please do not hesitate to call for any questions or concerns.     Electronically signed by Flora Montalvo MD on 3/3/2021 at 10:07 AM

## 2021-03-03 NOTE — PROGRESS NOTES
ABG drawn x 1 from Right Radial. Patient had   Unable to do Rey's Test.  Patient was on 3 liters/min via nasal cannula  at time of puncture. Pressure held for 7. No bleeding or bruising noted at puncture site.   Patient tolerated procedure well      Performed by Mikhail Mayes

## 2021-03-03 NOTE — PROGRESS NOTES
RN called me an hour ago saying that patient was wheezy. Treatments were orderd, and upon arrival patient was in severe respiratory distress with an increased work of breathing, respiratory rate of 35, abdominal breathing, and diaphoretic. Placed patient on BiPAP with settings of 12/6. Patient's breathing immediately slowed to a respiratory rate of 16, patient no longer demonstrated increased work of breathing.

## 2021-03-03 NOTE — FLOWSHEET NOTE
Inpatient Wound Care(initial evaluation) 8209a    Admit Date: 2/27/2021 11:26 AM    Reason for consult:  back    Significant history:    Chief Complaint: Altered mental status probably due to UTI  From a nursing facility presented to the emergency department due to altered mental status probably due to UTI patient also has a past medical history of dementia, peripheral vascular disease, osteoarthritis, hypertension, duodenal ulcer, anemia and an acute kidney injury. admitted to [facility] service for medical management and treatment of a complicated urinary tract infection with dementia and altered mental status. Wound history:  Admitted with wound    Findings:     03/03/21 1050   Skin Integrity   Skin Integrity   (yellow perpendicular line)   Location  right inner buttocks   Skin Integrity Site 3   Skin Integrity Location 3 Bruising; Redness    Location 3 BUE   Skin Integrity Site 4   Skin Integrity Location 4 Redness   Location 4 under left breast   Skin Integrity Site 5   Skin Integrity Location 5   (boggy, blanchable)   Location 5 bilateral heels- right greater than left   Wound 02/27/21 Back Mid   Date First Assessed/Time First Assessed: 02/27/21 2100   Present on Hospital Admission: Yes  Location: Back  Wound Location Orientation: Mid   Wound Image    Dressing/Treatment Alginate  (stratasorb)   Wound Length (cm) 5 cm   Wound Width (cm) 2 cm   Wound Depth (cm) 0.1 cm   Wound Surface Area (cm^2) 10 cm^2   Change in Wound Size % (l*w) -11.11   Wound Volume (cm^3) 1 cm^3   Wound Healing % -11   Wound Assessment Purple/maroon   Drainage Amount Small   Drainage Description Purulent   Odor None   Betzaida-wound Assessment Dry/flaky     **Informed Consent**    photos taken of wounds and inserted into their chart as part of their permanent medical record for purposes of documentation, treatment management and/or medical review.    All Images taken on 3/3/21 of patient name: Riky Mary were transmitted and stored on secured Joseph Moreau located within [facility] by a registered [employer] Mobile Application Device.    purewick in place  Bilateral foot drop    Plan:  Spoke to Dr. Elba Varela to notify of purulent drainage from back wound  heelmedix boots  Dietary consult  Opticell to back wound  Antifungal powder to folds  aquaphor to buttocks    Eloy Mccain 3/3/2021 1:09 PM

## 2021-03-04 NOTE — PROGRESS NOTES
GENERAL SURGERY  DAILY PROGRESS NOTE  3/4/2021    Chief Complaint   Patient presents with    Altered Mental Status     per [facility] pt was unresponsive with L sided facial droop, EMS states pt was responsive laying on her L side with pinpoint pupils, pt is A&O x2 in triage, [id number]       Subjective:  Patient is not oriented but in no acute distress. Objective:  BP (!) 108/53   Pulse 77   Temp 97.3 °F (36.3 °C) (Oral)   Resp 16   Ht 5' 3\" (1.6 m)   Wt 169 lb (76.7 kg)   SpO2 95%   BMI 29.94 kg/m²     General Appearance:  awake, alert, not oriented, in no acute distress  Skin: Upper mid back subcutaneous abscess approximately 2 cm x 2 cm with overlying erythema. It is spontaneously draining purulent fluid. Head/face:  NCAT  Eyes:  No gross abnormalities. Lungs:  Breathing Pattern: regular, no distress  Heart:  Heart regular rate   Abdomen:  Soft, non-tender, non distended. No guarding or rigididty  Extremities: Extremities warm to touch, pink, with LE edema. Assessment/Plan:  80 y.o. female with mid upper back soft tissue abscess, sp bedside I&D 3/3. History of dementia, believed to be altered from baseline.   Asymptomatic bacteriuria.    -Do not believe this is a source of suspected sepsis or patient's altered mental status  -Patient has remained without leukocytosis and has been afebrile on this entire admission  -Wound can be packed BID covered with 4x4 and paper tape  -Cultures pending  -ABX per ID  -Overall care per primary       Electronically signed by Roland Marino DO on 3/4/2021 at 6:44 AM

## 2021-03-04 NOTE — PROGRESS NOTES
KENNEDY PROGRESS NOTE      Chief complaint: Follow-up of Staphylococcus epidermidis on blood cultures    The patient is a 80 y.o. female nursing home resident with history of hypertension, peripheral vascular disease, presented on 02/27 with confusion. On admission, she was afebrile and hemodynamically stable with no leukocytosis. Chest x-ray was unremarkable. CT head without contrast showed no acute intracranial hemorrhage or edema. Urinalysis showed pyuria of 10-20 WBCs. Blood cultures showed Staphylococcus epidermidis and hominis. Ceftriaxone was started on admission. She was noted to have an abscess on her upper bakc for which she underwent bedside I&D on 03/03 during which approximately 5 cc of bloody material was expressed. Wound Gram stain and culture showed rare polymorphonuclear leukocytes, no epithelial cells, few Gram-positive diplococci, no growth to date. Subjective: Patient was seen and examined. No chills, no abdominal pain, no diarrhea, no rash, no itching, no pain on her upper back. Objective:  BP (!) 104/58   Pulse 77   Temp 97 °F (36.1 °C) (Temporal)   Resp 16   Ht 5' 3\" (1.6 m)   Wt 169 lb (76.7 kg)   SpO2 95%   BMI 29.94 kg/m²   Constitutional: Alert, not in distress  Respiratory: Clear breath sounds, no crackles, no wheezes  Cardiovascular: Regular rate and rhythm, no murmurs  Gastrointestinal: Bowel sounds present, soft, nontender  Skin: Warm and dry, left upper back wound with dressing in place  Musculoskeletal: No joint swelling, no joint erythema    Labs, imaging, and medical records/notes were personally reviewed. Assessment:  Asymptomatic bacteriuria  Staphylococcus epidermidis/hominis on blood cultures, deemed contaminant  Abscess, left upper back, s/p bedside I&D on 03/03    Recommendations:  Start amoxicillin-clavulanate 875-125 mg q12h and doxycycline 100 mg q12h for now pending abscess fluid cultures. Follow up abscess fluid culture.   Follow up blood cultures from 02/28.     Thank you for involving me in the care of Yulisa Fabian. I will continue to follow. Please do not hesitate to call for any questions or concerns.     Electronically signed by Julieta Spicer MD on 3/4/2021 at 11:04 AM

## 2021-03-04 NOTE — PROGRESS NOTES
Hospitalist Progress Note      SYNOPSIS: Patient admitted on 2021 from a nursing facility presented to the emergency department due to altered mental status probably due to UTI patient also has a past medical history of dementia, peripheral vascular disease, osteoarthritis, hypertension, duodenal ulcer, anemia and an acute kidney injury. SUBJECTIVE:  Stable overnight. No other overnight issues reported. Patient seen and examined  Records reviewed. Has confusion, yelling out  Abscess drained by surgery  Placed on abx by ID          Temp (24hrs), Av.3 °F (36.3 °C), Min:97.3 °F (36.3 °C), Max:97.3 °F (36.3 °C)    DIET: DIET DENTAL SOFT;  CODE: [id number]    Intake/Output Summary (Last 24 hours) at 3/4/2021 0929  Last data filed at 3/4/2021 0847  Gross per 24 hour   Intake 480 ml   Output 1100 ml   Net -620 ml       Review of Systems  All bolded are positive; please see HPI  General:  Fever, chills, diaphoresis, fatigue, malaise, night sweats, weight loss  Psychological:  Anxiety, disorientation, hallucinations. ENT:  Epistaxis, headaches, vertigo, visual changes. Cardiovascular:  Chest pain, irregular heartbeats, palpitations, paroxysmal nocturnal dyspnea. Respiratory:  Shortness of breath, coughing, sputum production, hemoptysis, wheezing, orthopnea.   Gastrointestinal:  Nausea, vomiting, diarrhea, heartburn, constipation, abdominal pain, hematemesis, hematochezia, melena, acholic stools  Genito-Urinary:  Dysuria, urgency, frequency, hematuria  Musculoskeletal:  Joint pain, joint stiffness, joint swelling, muscle pain  Neurology:  Headache, focal neurological deficits, weakness, numbness, paresthesia  Derm:  Rashes, ulcers, excoriations, bruising  Extremities:  Decreased ROM, peripheral edema, mottling      OBJECTIVE:    BP (!) 108/53   Pulse 77   Temp 97.3 °F (36.3 °C) (Oral)   Resp 16   Ht 5' 3\" (1.6 m)   Wt 169 lb (76.7 kg)   SpO2 95%   BMI 29.94 kg/m²     General appearance:  awake, alert, and oriented to person, place, time, and purpose; appears stated age and cooperative; no apparent distress no labored breathing    HEENT:  Conjunctivae/corneas clear. Neck: Supple. No jugular venous distention. Respiratory: symmetrical; clear to auscultation bilaterally; no wheezes; no rhonchi; no rales  Cardiovascular: rhythm regular; rate controlled; no murmurs  Abdomen: Soft, nontender, nondistended  Extremities:  peripheral pulses present; no peripheral edema; no ulcers  Musculoskeletal: No clubbing, cyanosis, no bilateral lower extremity edema. Brisk capillary refill. Skin:  No rashes  on visible skin  Neurologic: awake, alert and following commands     ASSESSMENT and PLAN:  · Acute metabolic encephalopathy -blood cultures did grow staph but likely a contaminant. ID following off antibiotics. Patient seems more confused today than yesterday. Awaiting blood work and ABGs. · Abscess- drained by gen surgery. On bactrim/doxy  · Acute hypoxic respiratory failure- CXR negative for acute process.     · Iron deficiency anemia-ferrous sulfate twice daily  · Hypertension-metoprolol, monitor blood pressure  · GERD-Protonix  · Chronic dementia-Risperdal         DISPOSITION: Continue current plan of care    Medications:  REVIEWED DAILY    Infusion Medications   Scheduled Medications    mineral oil-hydrophil petrolat   Topical BID    miconazole   Topical BID    metoprolol succinate  50 mg Oral Daily    pantoprazole  40 mg Oral QAM AC    risperiDONE  0.5 mg Oral BID    rivastigmine  1 patch Transdermal Daily    vitamin D  50,000 Units Oral Q14 Days    sodium chloride flush  10 mL Intravenous 2 times per day    enoxaparin  40 mg Subcutaneous Daily    ferrous sulfate  300 mg Oral BID     PRN Meds: albuterol, mineral oil-hydrophil petrolat, sodium chloride flush, promethazine **OR** ondansetron, acetaminophen **OR** acetaminophen, polyethylene glycol    Labs:     Recent Labs     03/02/21  1104

## 2021-03-04 NOTE — PROGRESS NOTES
Date: 3/3/2021    Time: 9:04 PM    Patient Placed On BIPAP/CPAP/ Non-Invasive Ventilation? No    If no must comment. Facial area red/color change? No           If YES are Blister/Lesion present? No   If yes must notify nursing staff  BIPAP/CPAP skin barrier?   No    Skin barrier type:none       Comments:    Pt refused to wear the bipap    [employer]

## 2021-03-04 NOTE — CARE COORDINATION
3/4/21 Update CM note: Patient remains confused. Surgery completed I/D of abscess on her back last pm. She is being started on iv ancef q8. She has labs pending for this am. Precert has been completed and patient can discharge back to [facility] when cleared medically. Will follow for labs.  Jorge Ghotra RN CM

## 2021-03-05 NOTE — CARE COORDINATION
3/5/21 Update CM Note: Patient to return to [facility] today via stretcher. Will be picked up by [facility] for transport at 1:30pm per Xi C[facility] at [facility]. Bedside nurse notified.  Dedrick Smith RN CM

## 2021-03-05 NOTE — PROGRESS NOTES
Called report to Kyleigh at [facility]. AVS faxed to [phone]. RN instructed that patient needs mid upper back incision packed with iodoform, covered with ABD and dressing BID.     Marly Pike RN

## 2021-03-05 NOTE — DISCHARGE SUMMARY
Discharge Summary    Admit date: 2/27/2021    Discharge date and time: No discharge date for patient encounter. Admitting Physician: Vonnie Ashley MD     Consultants: infectious disease, general surgery    Admission Diagnoses:  AMS    Discharge Diagnoses and Hospital Course:  · Acute metabolic encephalopathy -blood cultures did grow staph but likely a contaminant. ID following. Patient has abscess on back which was drained by general surgery. Placed on bacrin and doxy. · Abscess- drained by gen surgery. On bactrim/doxy  · Acute hypoxic respiratory failure- CXR negative for acute process. · Iron deficiency anemia-ferrous sulfate twice daily  · Hypertension-metoprolol, monitor blood pressure  · GERD-Protonix  · Chronic dementia-Risperdal    Discharge Exam:  Vitals:    03/05/21 0942   BP: (!) 114/55   Pulse: 81   Resp: 16   Temp: 96.4 °F (35.8 °C)   SpO2: 96%       General appearance:  awake, alert, and oriented to person, place; appears stated age and cooperative; no apparent distress no labored breathing    HEENT:  Conjunctivae/corneas clear. Neck: Supple. No jugular venous distention. Respiratory: symmetrical; clear to auscultation bilaterally; no wheezes; no rhonchi; no rales  Cardiovascular: rhythm regular; rate controlled; no murmurs  Abdomen: Soft, nontender, nondistended  Extremities:  peripheral pulses present; no peripheral edema; no ulcers  Musculoskeletal: No clubbing, cyanosis, no bilateral lower extremity edema. Brisk capillary refill. Skin:  No rashes  on visible skin  Neurologic: awake, alert and following commands     Disposition: [facility]  The patient's condition is fair. At this time the patient is without objective evidence of an acute process requiring continuing hospitalization or inpatient management. They are stable for discharge with outpatient follow-up.      I have spoken with the patient and discussed the results of the current hospitalization, in addition to providing specific details for the plan of care and counseling regarding the diagnosis and prognosis. The plan has been discussed in detail and they are aware of the specific conditions for emergent return, as well as the importance of follow-up. Their questions are answered at this time and they are agreeable with the plan for discharge to [facility]     Patient Instructions: Follow up with PCP within one week. No future appointments. Discharge Medications:     Medication List      START taking these medications    amoxicillin-clavulanate 875-125 MG per tablet  Commonly known as: Augmentin  Take 1 tablet by mouth 2 times daily for 5 days     doxycycline hyclate 100 MG tablet  Commonly known as: VIBRA-TABS  Take 1 tablet by mouth 2 times daily for 5 days Take doxycycline with full glass of water and avoid laying supine for at least 2 hours after taking doxycycline. Wear sunscreen before sun exposure while on doxycycline. miconazole 2 % powder  Commonly known as: MICOTIN  Apply topically 2 times daily. mineral oil-hydrophil petrolat Oint ointment  Apply 1 Act topically 2 times daily        CONTINUE taking these medications    acetaminophen 325 MG tablet  Commonly known as: TYLENOL     ferrous sulfate 325 (65 Fe) MG tablet  Commonly known as: IRON 325     metoprolol succinate 50 MG extended release tablet  Commonly known as: TOPROL XL     mineral oil-hydrophilic petrolatum ointment  Apply topically as needed.      omeprazole 20 MG delayed release capsule  Commonly known as: PRILOSEC     polyethylene glycol 17 g packet  Commonly known as: GLYCOLAX     potassium chloride 20 MEQ extended release tablet  Commonly known as: KLOR-CON M     risperiDONE 0.5 MG tablet  Commonly known as: RISPERDAL     rivastigmine 4.6 MG/24HR  Commonly known as: EXELON     vitamin D 1.25 MG (56634 UT) Caps capsule  Commonly known as: ERGOCALCIFEROL           Where to Get Your Medications      Information about where to get these

## 2021-03-05 NOTE — CARE COORDINATION
3/5/21 Update CM Note; Precert has been completed x2 days and patient held due to ID awating results of cultures from I/D of back abscess. Dr Catina Dawn notified and ready to discharge if ok with ID. Patient is on oral doxy bid and augmentin po bid. Will await ID to return call to check on discharge back to [facility] for today.  Stephanie Garnett RN CM

## 2021-03-05 NOTE — PROGRESS NOTES
[facility] PROGRESS NOTE      Chief complaint: Follow-up of Staphylococcus epidermidis on blood cultures    The patient is a 80 y.o. female nursing home resident with history of hypertension, peripheral vascular disease, presented on 02/27 with confusion. On admission, she was afebrile and hemodynamically stable with no leukocytosis. Chest x-ray was unremarkable. CT head without contrast showed no acute intracranial hemorrhage or edema. Urinalysis showed pyuria of 10-20 WBCs. Blood cultures showed Staphylococcus epidermidis and hominis. Ceftriaxone was started on admission. She was noted to have an abscess on her upper bakc for which she underwent bedside I&D on 03/03 during which approximately 5 cc of bloody material was expressed. Wound Gram stain and culture showed rare polymorphonuclear leukocytes, no epithelial cells, few Gram-positive diplococci, rare growth of Gram-negative rods, anaerobes. Subjective: Patient was seen and examined. No chills, no abdominal pain, no diarrhea, no rash, no itching, no pain on her upper back. Objective:  BP (!) 114/55   Pulse 81   Temp 96.4 °F (35.8 °C) (Temporal)   Resp 16   Ht 5' 3\" (1.6 m)   Wt 169 lb (76.7 kg)   SpO2 96%   BMI 29.94 kg/m²   Constitutional: Alert, not in distress  Respiratory: Clear breath sounds, no crackles, no wheezes  Cardiovascular: Regular rate and rhythm, no murmurs  Gastrointestinal: Bowel sounds present, soft, nontender  Skin: Warm and dry, left upper back wound clean and dry with packing in place  Musculoskeletal: No joint swelling, no joint erythema    Labs, imaging, and medical records/notes were personally reviewed.     Assessment:  Asymptomatic bacteriuria  Staphylococcus epidermidis/hominis on blood cultures, deemed contaminant  Abscess, left upper back, s/p bedside I&D on 03/03    Recommendations:  Continue amoxicillin-clavulanate 875-125 mg q12h and change doxycycline to Bactrim DS 1 tab BID to finish 7 days of antibiotic therapy from 03/04-03/10. Follow up abscess fluid culture. Follow up blood cultures from 02/28.     Thank you for involving me in the care of Maria Guadalupe Mcclain. I will sign off for now. Please do not hesitate to call for any questions, concerns, or new findings.     Electronically signed by Lena De La Cruz MD on 3/5/2021 at 10:51 AM

## 2021-03-11 NOTE — PROGRESS NOTES
Physician Progress Note      PATIENTJopatrickl   CSN #:                  [id number]  :                       1932  ADMIT DATE:       2021 11:26 AM  DISCH DATE:        3/5/2021 2:18 PM  RESPONDING  PROVIDER #:        Darius TRINH DO          QUERY TEXT:    Dr. Bright Subramanian,    Patient admitted with AMS. Per Procedure note dated 3/3/21 documentation of   I&D. To accurately reflect the procedure performed please further specify the depth   of tissue incised and drained: The medical record reflects the following:  Risk Factors: Abscess mid upper back  Clinical Indicators: per the 3/3 procedure note \"An incision was then made   over the apex of the lesion and approximately 5 cc of bloody material was   expressed. Delmon Green Yadira Green The drainage cavity was then packed with sterile gauze and   dressed\"  Treatment: *I&D with packing, IV antibiotics, wound cultures    Thank you,  Lj Ibarra RN  Clinical Documentation Improvement  Options provided:  -- Skin only  -- Subcutaneous tissue  -- Fascia  -- Muscle  -- Other - I will add my own diagnosis  -- Disagree - Not applicable / Not valid  -- Disagree - Clinically unable to determine / Unknown  -- Refer to Clinical Documentation Reviewer    PROVIDER RESPONSE TEXT:    Addendum to 3/3/21 procedure note The depth of the drainage to back abscess   was down to and including subcutaneous tissue.     Query created by: Nery Merrill on 3/4/2021 9:03 AM      Electronically signed by:  Huan TRINH DO 3/11/2021 4:50 PM

## 2021-03-29 PROBLEM — N39.0 UTI (URINARY TRACT INFECTION): Status: RESOLVED | Noted: 2021-01-01 | Resolved: 2021-01-01

## 2021-05-15 PROBLEM — E03.9 HYPOTHYROIDISM: Status: ACTIVE | Noted: 2021-01-01

## 2021-05-15 PROBLEM — R09.02 HYPOXIA: Status: ACTIVE | Noted: 2021-01-01
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
## 2021-05-15 PROBLEM — J18.9 PNEUMONIA: Status: ACTIVE | Noted: 2021-01-01

## 2021-05-15 PROBLEM — I48.91 ATRIAL FIBRILLATION (HCC): Status: ACTIVE | Noted: 2021-01-01

## 2021-05-15 PROBLEM — A41.9 SEPSIS (HCC): Status: ACTIVE | Noted: 2021-01-01

## 2021-05-15 PROBLEM — K62.89 PROCTITIS: Status: ACTIVE | Noted: 2021-01-01

## 2021-05-15 PROBLEM — R82.90 ABNORMAL URINALYSIS: Status: ACTIVE | Noted: 2021-01-01

## 2021-05-15 PROBLEM — J18.9 HEALTHCARE-ASSOCIATED PNEUMONIA: Status: ACTIVE | Noted: 2021-01-01

## 2021-05-15 PROBLEM — J18.9 HAP (HOSPITAL-ACQUIRED PNEUMONIA): Status: ACTIVE | Noted: 2021-01-01

## 2021-05-15 PROBLEM — D72.829 LEUKOCYTOSIS: Status: ACTIVE | Noted: 2021-01-01

## 2021-05-15 PROBLEM — K59.00 CONSTIPATION: Status: ACTIVE | Noted: 2021-01-01

## 2021-05-15 PROBLEM — I10 ESSENTIAL HYPERTENSION: Status: ACTIVE | Noted: 2021-01-01

## 2021-05-15 PROBLEM — R74.8 ELEVATED LIVER ENZYMES: Status: ACTIVE | Noted: 2021-01-01

## 2021-05-15 PROBLEM — R41.82 AMS (ALTERED MENTAL STATUS): Status: ACTIVE | Noted: 2021-01-01

## 2021-05-15 NOTE — ED PROVIDER NOTES
Chief Complaint   Patient presents with    Altered Mental Status     sent in by ecf for unresponsive to verbal and tactice stimuli. pt is awake and at her baseline per ecf. not making sense and oriented to  self only. pt given narcan per ems       Patient is a 80-year-old female presents today for altered mental status. Patient has history of dementia at baseline. Patient is usually alert to person per EMS, however they state as of 5:00 this morning nurse, noticed that she was more confused than usual.  She responds when her name is called, however has difficulty following simple commands. There have been no reports of fall, trauma or injury. Remainder of history and review of systems is limited due to patient's altered mental status. The history is provided by the patient. No  was used. Review of Systems   Unable to perform ROS: Mental status change        Physical Exam  Constitutional:       Appearance: She is ill-appearing. HENT:      Head: Normocephalic and atraumatic. Mouth/Throat:      Mouth: Mucous membranes are dry. Eyes:      Extraocular Movements: Extraocular movements intact. Pupils: Pupils are equal, round, and reactive to light. Cardiovascular:      Rate and Rhythm: Normal rate and regular rhythm. Pulses: Normal pulses. Heart sounds: Normal heart sounds. No murmur heard. Pulmonary:      Effort: No respiratory distress. Breath sounds: Normal breath sounds. No wheezing or rales. Abdominal:      General: Abdomen is flat. Bowel sounds are normal.      Tenderness: There is no abdominal tenderness. There is no guarding or rebound. Comments: Peg tube well appearing   Musculoskeletal:      Cervical back: Normal range of motion and neck supple. Neurological:      Comments:  Altered  Not following commands  Difficulty with speech          Procedures     Labs Reviewed   CBC WITH AUTO DIFFERENTIAL - Abnormal; Notable for the following components:       Result Value    WBC 17.1 (*)     RBC 3.40 (*)     .8 (*)     RDW 15.3 (*)     Neutrophils % 83.9 (*)     Lymphocytes % 9.5 (*)     Neutrophils Absolute 14.38 (*)     Eosinophils Absolute 0.01 (*)     All other components within normal limits   COMPREHENSIVE METABOLIC PANEL W/ REFLEX TO MG FOR LOW K - Abnormal; Notable for the following components:    Sodium 131 (*)     Potassium reflex Magnesium 5.1 (*)     Chloride 97 (*)     BUN 55 (*)     Albumin 3.3 (*)     Alkaline Phosphatase 140 (*)     ALT 45 (*)     AST 41 (*)     All other components within normal limits   URINALYSIS - Abnormal; Notable for the following components:    Blood, Urine MODERATE (*)     Protein, UA 30 (*)     Leukocyte Esterase, Urine TRACE (*)     All other components within normal limits   MICROSCOPIC URINALYSIS - Abnormal; Notable for the following components:    RBC, UA 5-10 (*)     Bacteria, UA RARE (*)     All other components within normal limits   COVID-19, RAPID   CULTURE, URINE   CULTURE, BLOOD 1   CULTURE, BLOOD 2   TROPONIN   LACTIC ACID, PLASMA   POCT GLUCOSE   POCT GLUCOSE     CT ABDOMEN PELVIS W IV CONTRAST Additional Contrast? None   Final Result   Persistent atelectasis in the lower lobes, right middle lobe and lingula. Developing pneumonia is considered. Asymmetric soft tissue density in the right breast.  Consider mammography. Persistent nodular density adjacent to the pancreatic head probably lymph   node. Surveillance recommended. CT ABDOMEN AND PELVIS. Comparison August 18, 2020      Findings      The liver is of normal architecture gallbladder is distended. Consider   ultrasonography. Spleen, and pancreas appear normal.  The previously noted   1.1 cm nodule somewhat exophytic to the head of the pancreas is noted without   change. Adrenals and the kidneys are normal except for bilateral renal cyst,   the larger 1 on the right side measuring 3.5 cm.   Compression deformity of   T12 is noted. Pelvis. Bladder is partially contracted with wall thickening. Please   correlate with urinalysis. Colon is tortuous and redundant with   constipation. There is distended cecum with wall thickening and presacral   edema concerning for proctitis. Appendix cannot be identified. Impression      Constipation with distended rectum with wall thickening and presacral edema   concerning for proctitis. Distended gallbladder with mild wall thickening. Please correlate with   ultrasonography to evaluate for cholecystitis. Persistent nodular density adjacent the pancreas. Consider surveillance. CT CHEST W CONTRAST   Final Result   Persistent atelectasis in the lower lobes, right middle lobe and lingula. Developing pneumonia is considered. Asymmetric soft tissue density in the right breast.  Consider mammography. Persistent nodular density adjacent to the pancreatic head probably lymph   node. Surveillance recommended. CT ABDOMEN AND PELVIS. Comparison August 18, 2020      Findings      The liver is of normal architecture gallbladder is distended. Consider   ultrasonography. Spleen, and pancreas appear normal.  The previously noted   1.1 cm nodule somewhat exophytic to the head of the pancreas is noted without   change. Adrenals and the kidneys are normal except for bilateral renal cyst,   the larger 1 on the right side measuring 3.5 cm. Compression deformity of   T12 is noted. Pelvis. Bladder is partially contracted with wall thickening. Please   correlate with urinalysis. Colon is tortuous and redundant with   constipation. There is distended cecum with wall thickening and presacral   edema concerning for proctitis. Appendix cannot be identified. Impression      Constipation with distended rectum with wall thickening and presacral edema   concerning for proctitis. Distended gallbladder with mild wall thickening.   Please circulation, collateral, GERD (gastroesophageal reflux disease), History of bleeding ulcers, Hypertension, Hyperuricemia, Metabolic acidosis, and PVD (peripheral vascular disease) ([facility] [address] Past Surgical History:  has a past surgical history that includes Hysterectomy (early 80's); Endoscopy, colon, diagnostic (colonoscopy); Tonsillectomy (as a child); Wrist fracture surgery (Right, as a child); fracture surgery (Right, 12/7/2013); Upper gastrointestinal endoscopy (N/A, 8/25/2020); and Upper gastrointestinal endoscopy (8/25/2020). Social History:  reports that she has never smoked. She has never used smokeless tobacco. She reports that she does not drink alcohol and does not use drugs. Family History: family history is not on file. The patients home medications have been reviewed. Allergies: Patient has no known allergies.     -------------------------------------------------- RESULTS -------------------------------------------------    LABS:  Results for orders placed or performed during the hospital encounter of 05/15/21   COVID-19, Rapid    Specimen: Nasopharyngeal Swab   Result Value Ref Range    SARS-CoV-2, NAAT Not Detected Not Detected   CBC Auto Differential   Result Value Ref Range    WBC 17.1 (H) 4.5 - 11.5 E9/L    RBC 3.40 (L) 3.50 - 5.50 E12/L    Hemoglobin 11.9 11.5 - 15.5 g/dL    Hematocrit 36.3 34.0 - 48.0 %    .8 (H) 80.0 - 99.9 fL    MCH 35.0 26.0 - 35.0 pg    MCHC 32.8 32.0 - 34.5 %    RDW 15.3 (H) 11.5 - 15.0 fL    Platelets 779 842 - 732 E9/L    MPV 10.6 7.0 - 12.0 fL    Neutrophils % 83.9 (H) 43.0 - 80.0 %    Immature Granulocytes % 2.2 0.0 - 5.0 %    Lymphocytes % 9.5 (L) 20.0 - 42.0 %    Monocytes % 4.1 2.0 - 12.0 %    Eosinophils % 0.1 0.0 - 6.0 %    Basophils % 0.2 0.0 - 2.0 %    Neutrophils Absolute 14.38 (H) 1.80 - 7.30 E9/L    Immature Granulocytes # 0.37 E9/L    Lymphocytes Absolute 1.63 1.50 - 4.00 E9/L    Monocytes Absolute 0.70 0.10 - 0.95 E9/L    Eosinophils Absolute 0.01 (L) 0.05 - 0.50 E9/L    Basophils Absolute 0.03 0.00 - 0.20 E9/L   Comprehensive Metabolic Panel w/ Reflex to MG   Result Value Ref Range    Sodium 131 (L) 132 - 146 mmol/L    Potassium reflex Magnesium 5.1 (H) 3.5 - 5.0 mmol/L    Chloride 97 (L) 98 - 107 mmol/L    CO2 25 22 - 29 mmol/L    Anion Gap 9 7 - 16 mmol/L    Glucose 95 74 - 99 mg/dL    BUN 55 (H) 6 - 23 mg/dL    CREATININE 0.8 0.5 - 1.0 mg/dL    GFR Non-African American >60 >=60 mL/min/1.73    GFR African American >60     Calcium 9.6 8.6 - 10.2 mg/dL    Total Protein 7.1 6.4 - 8.3 g/dL    Albumin 3.3 (L) 3.5 - 5.2 g/dL    Total Bilirubin 0.2 0.0 - 1.2 mg/dL    Alkaline Phosphatase 140 (H) 35 - 104 U/L    ALT 45 (H) 0 - 32 U/L    AST 41 (H) 0 - 31 U/L   Troponin   Result Value Ref Range    Troponin 0.02 0.00 - 0.03 ng/mL   Lactic Acid, Plasma   Result Value Ref Range    Lactic Acid 1.2 0.5 - 2.2 mmol/L   Urinalysis, reflex to microscopic   Result Value Ref Range    Color, UA Yellow Straw/Yellow    Clarity, UA Clear Clear    Glucose, Ur Negative Negative mg/dL    Bilirubin Urine Negative Negative    Ketones, Urine Negative Negative mg/dL    Specific Gravity, UA 1.015 1.005 - 1.030    Blood, Urine MODERATE (A) Negative    pH, UA 7.5 5.0 - 9.0    Protein, UA 30 (A) Negative mg/dL    Urobilinogen, Urine 0.2 <2.0 E.U./dL    Nitrite, Urine Negative Negative    Leukocyte Esterase, Urine TRACE (A) Negative   Microscopic Urinalysis   Result Value Ref Range    WBC, UA 1-3 0 - 5 /HPF    RBC, UA 5-10 (A) 0 - 2 /HPF    Renal Epithelial, UA RARE /HPF    Bacteria, UA RARE (A) None Seen /HPF   POCT Glucose   Result Value Ref Range    Meter Glucose 97 74 - 99 mg/dL   EKG 12 Lead   Result Value Ref Range    Ventricular Rate 100 BPM    Atrial Rate 394 BPM    QRS Duration 74 ms    Q-T Interval 330 ms    QTc Calculation (Bazett) 425 ms    R Axis -10 degrees    T Axis -18 degrees       RADIOLOGY:  CT ABDOMEN PELVIS W IV CONTRAST Additional Contrast? None   Final Result   Persistent atelectasis in the lower lobes, right middle lobe and lingula. Developing pneumonia is considered. Asymmetric soft tissue density in the right breast.  Consider mammography. Persistent nodular density adjacent to the pancreatic head probably lymph   node. Surveillance recommended. CT ABDOMEN AND PELVIS. Comparison August 18, 2020      Findings      The liver is of normal architecture gallbladder is distended. Consider   ultrasonography. Spleen, and pancreas appear normal.  The previously noted   1.1 cm nodule somewhat exophytic to the head of the pancreas is noted without   change. Adrenals and the kidneys are normal except for bilateral renal cyst,   the larger 1 on the right side measuring 3.5 cm. Compression deformity of   T12 is noted. Pelvis. Bladder is partially contracted with wall thickening. Please   correlate with urinalysis. Colon is tortuous and redundant with   constipation. There is distended cecum with wall thickening and presacral   edema concerning for proctitis. Appendix cannot be identified. Impression      Constipation with distended rectum with wall thickening and presacral edema   concerning for proctitis. Distended gallbladder with mild wall thickening. Please correlate with   ultrasonography to evaluate for cholecystitis. Persistent nodular density adjacent the pancreas. Consider surveillance. CT CHEST W CONTRAST   Final Result   Persistent atelectasis in the lower lobes, right middle lobe and lingula. Developing pneumonia is considered. Asymmetric soft tissue density in the right breast.  Consider mammography. Persistent nodular density adjacent to the pancreatic head probably lymph   node. Surveillance recommended. CT ABDOMEN AND PELVIS. Comparison August 18, 2020      Findings      The liver is of normal architecture gallbladder is distended. Consider   ultrasonography.   Spleen, and pancreas appear normal.  The previously noted   1.1 cm nodule somewhat exophytic to the head of the pancreas is noted without   change. Adrenals and the kidneys are normal except for bilateral renal cyst,   the larger 1 on the right side measuring 3.5 cm. Compression deformity of   T12 is noted. Pelvis. Bladder is partially contracted with wall thickening. Please   correlate with urinalysis. Colon is tortuous and redundant with   constipation. There is distended cecum with wall thickening and presacral   edema concerning for proctitis. Appendix cannot be identified. Impression      Constipation with distended rectum with wall thickening and presacral edema   concerning for proctitis. Distended gallbladder with mild wall thickening. Please correlate with   ultrasonography to evaluate for cholecystitis. Persistent nodular density adjacent the pancreas. Consider surveillance. CT Head WO Contrast   Final Result   No acute intracranial abnormality. XR CHEST PORTABLE   Final Result   Chronic elevation of the right hemidiaphragm. No acute cardiopulmonary   disease.                 ------------------------- NURSING NOTES AND VITALS REVIEWED ---------------------------  Date / Time Roomed:  5/15/2021  6:38 AM  ED Bed Assignment:  23/23    The nursing notes within the ED encounter and vital signs as below have been reviewed. Patient Vitals for the past 24 hrs:   BP Temp Pulse Resp SpO2   05/15/21 0652 (!) 143/58 98 °F (36.7 °C) 94 22 (!) 88 %       Oxygen Saturation Interpretation: Abnormal    ------------------------------------------ PROGRESS NOTES ------------------------------------------    Counseling:  I have spoken with the patient and discussed todays results, in addition to providing specific details for the plan of care and counseling regarding the diagnosis and prognosis.   Their questions are answered at this time and they are agreeable with the plan of admission.    --------------------------------- ADDITIONAL PROVIDER NOTES ---------------------------------  Consultations:  Spoke with JAIMEE. Discussed case. They will admit the patient. This patient's ED course included: a personal history and physicial examination    This patient has remained hemodynamically stable during their ED course. Medications   sodium chloride flush 0.9 % injection 10 mL (has no administration in time range)   0.9 % sodium chloride infusion (has no administration in time range)   metronidazole (FLAGYL) 500 mg in NaCl 100 mL IVPB premix (500 mg Intravenous New Bag 5/15/21 1349)   0.9 % sodium chloride bolus (0 mLs Intravenous Stopped 5/15/21 1109)   iopamidol (ISOVUE-370) 76 % injection 90 mL (90 mLs Intravenous Given 5/15/21 1124)   cefTRIAXone (ROCEPHIN) 1,000 mg in sterile water 10 mL IV syringe (0 mg Intravenous Stopped 5/15/21 1337)         Diagnosis:  1. Altered mental status, unspecified altered mental status type    2. Proctitis        Disposition:  Patient's disposition: Admit to telemetry  Patient's condition is stable.              Shaniqua Sams DO  Resident  05/15/21 9561

## 2021-05-15 NOTE — LETTER
[place] Department Medicaid  CERTIFICATION OF NECESSITY  FOR NON-EMERGENCY TRANSPORTATION   BY GROUND AMBULANCE      Individual Information   1. Name: Sunitha Danielle 2. [place] Medicaid Billing Number:    3. Address: [address]      Transportation Provider Information   4. Provider Name:    5. [place] Medicaid Provider Number:  National Provider Identifier (NPI):      Certification  7. Criteria:  During transport, this individual requires:  [x] Medical treatment or continuous     supervision by an EMT. [] The administration or regulation of oxygen by another person. [] Supervised protective restraint. 8. Period Beginning Date:    5. Length  [x] Not more than 1 day(s)  [x] One Year     Additional Information Relevant to Certification   10. Comments or Explanations, If Necessary or Appropriate     AMS; A&O x2     Certifying Practitioner Information   11. Name of Practitioner: Emeterio Pace MD   12. [place] Medicaid Provider Number, If Applicable: [address] Provider Identifier (NPI):      Signature Information   14. Date of Signature: 2021 15. Name of Person Signing: Judge Joseline RN     16. Signature and Professional Designation: Judge Joseline RN  BSN/CM     OD [id number]  Rev. 2015  [address] [place] [address] Encounter Date/Time: 5/15/2021 [facility] Account: [de-identified]    MRN: [id number]    Patient: Sunitha Danielle    Contact Serial #: [id number]      ENCOUNTER          Patient Class: I Private Enc?   No Unit [facility] [id number]   Hospital Service: Intermediate   Encounter DX: AMS (altered mental stat*   ADM Provider: Emeterio Pace MD   Procedure:     ATT Provider: Emeterio Pace MD   REF Provider:        Admission DX: AMS (altered mental status) and codes:       PATIENT                 Name: Sunitha Danielle : 1932 (88 yrs)   Address: [facility], 2022 Sex: Female   [facility] [address]         Marital Status: Single

## 2021-05-15 NOTE — ED NOTES
Dr Oscar Hill notified cultures were not drawn. Due to the patient being a hard stick he will draw if he still wants them after labs result.      Maurizio Hogan RN  05/15/21 2004

## 2021-05-16 NOTE — PLAN OF CARE
Problem: Falls - Risk of:  Goal: Will remain free from falls  Outcome: Met This Shift  Goal: Absence of physical injury  Outcome: Met This Shift     Problem: Skin Integrity:  Goal: Will show no infection signs and symptoms  Outcome: Met This Shift  Goal: Absence of new skin breakdown  Outcome: Met This Shift

## 2021-05-16 NOTE — H&P
[address] [facility]  History and Physical      CHIEF COMPLAINT:  Altered mental status       Patient of Rudy Mendez DO presents with:  AMS (altered mental status)    History of Present Illness:   Patient is a 80year old female with a past medical history of GERD, HTN, PVD, and LETHA. Patient presents to ER via EMS from facility for altered mental status. Information obtained through EHR. Patient is alert to self. EMS gave patient narcan which did not help. Patient was hypoxic at 80% and recovered with 2 liters nasal canula. ER workup reveal elevated WBC 17,000. CT Chest atelectasis lower lobes concerning for developing pneumonia. REVIEW OF SYSTEMS:  Pertinent negatives are above in HPI. 10 point ROS otherwise negative.       Past Medical History:   Diagnosis Date    LETHA (acute kidney injury) ([facility] [address] 8/18/2020    Bleeding ulcer 2008    Blood circulation, collateral pain to legs    GERD (gastroesophageal reflux disease)     History of bleeding ulcers     Hypertension     Hyperuricemia 0/74/4859    Metabolic acidosis 3/16/7412    PVD (peripheral vascular disease) ([facility] [address]          Past Surgical History:   Procedure Laterality Date    ENDOSCOPY, COLON, DIAGNOSTIC  colonoscopy    FRACTURE SURGERY Right 12/7/2013    ORIF RIGHT FEMUR    HYSTERECTOMY  early 80's    TONSILLECTOMY  as a child    UPPER GASTROINTESTINAL ENDOSCOPY N/A 8/25/2020    EGD PEG TUBE INSERTION performed by Iman Sheehan MD at [facility]  8/25/2020    EGD BIOPSY performed by Iman Sheehan MD at [facility] Right as a child       Medications Prior to Admission:    Medications Prior to Admission: Multiple Vitamin (MULTI-VITAMIN PO), Take 1 tablet by mouth daily  Cranberry 500 MG TABS, Take 1 tablet by mouth daily  levothyroxine (SYNTHROID) 50 MCG tablet, Take 50 mcg by mouth Daily  memantine (NAMENDA) 10 MG tablet, Take 10 mg by mouth 2 Alert and interactive, face symmetric, speech fluent. Patient only alert to self. 0/5 strength bilateral lower extremities, 2/5 strength bilateral upper extremities. LABS:  All labs reviewed. Of note:  CBC with Differential:    Lab Results   Component Value Date    WBC 17.1 05/15/2021    RBC 3.40 05/15/2021    HGB 11.9 05/15/2021    HCT 36.3 05/15/2021     05/15/2021    .8 05/15/2021    MCH 35.0 05/15/2021    MCHC 32.8 05/15/2021    RDW 15.3 05/15/2021    NRBC 1.8 04/13/2021    SEGSPCT 66 12/06/2013    LYMPHOPCT 9.5 05/15/2021    MONOPCT 4.1 05/15/2021    BASOPCT 0.2 05/15/2021    MONOSABS 0.70 05/15/2021    LYMPHSABS 1.63 05/15/2021    EOSABS 0.01 05/15/2021    BASOSABS 0.03 05/15/2021     CMP:    Lab Results   Component Value Date     05/15/2021    K 5.1 05/15/2021    CL 97 05/15/2021    CO2 25 05/15/2021    BUN 55 05/15/2021    CREATININE 0.8 05/15/2021    GFRAA >60 05/15/2021    LABGLOM >60 05/15/2021    GLUCOSE 84 05/15/2021    GLUCOSE 100 05/21/2012    PROT 7.1 05/15/2021    LABALBU 3.3 05/15/2021    LABALBU 4.2 05/21/2012    CALCIUM 9.6 05/15/2021    BILITOT 0.2 05/15/2021    ALKPHOS 140 05/15/2021    AST 41 05/15/2021    ALT 45 05/15/2021       Imaging:  CXR: Chronic elevation of the right hemidiaphragm. No acute cardiopulmonary disease. CT head: No acute intracranial abnormality. CT abdomen pelvis: Constipation with distended rectum with wall thickening and presacral edema concerning for proctitis. Distended gallbladder with mild wall thickening. Persistent nodular density adjacent the pancreas. Consider surveillance. CT chest: Persistent atelectasis in the lower lobes, right middle lobe and lingula. Developing pneumonia is considered. Asymmetric soft tissue density in the right breast.  Consider mammography. Persistent nodular density adjacent to the pancreatic head probably lymph node. Ultrasound gallbladder: Unremarkable right upper quadrant ultrasound. Multiple gallstones in an otherwise normal-appearing gallbladder. Normal common bile duct. EKG:  I've personally reviewed the patient's EKG:  A. fib    Telemetry:  I've personally reviewed the patient's telemetry:      ASSESSMENT/PLAN:  Principal Problem:    AMS (altered mental status)  Active Problems:    GERD (gastroesophageal reflux disease)    Dementia (HCC)    Sepsis (HCC)    Leukocytosis    Hypoxia    Hypothyroidism    Essential hypertension    Abnormal urinalysis    Healthcare-associated pneumonia    Atrial fibrillation (HCC)    Elevated liver enzymes    Proctitis    Constipation  Resolved Problems:    * No resolved hospital problems. *    78-year-old female with past medical history of GERD, HTN, LETHA, PVD admitted with altered mental status. 1.  Telemetry monitoring  2. Monitor labs supplement electrolytes as needed  3. IV hydration  4. IV antibiotics: Rocephin 1 g/cefepime 2 g  5. Medication for other comorbidities continue as appropriate dose adjustment as necessary. DVT prophylaxis  PT OT  Discharge planning  Case discussed with attending and agreed upon plan of care. Code status: DNR CCA  Requires inpatient level of care  TIKA Joya CNP    8:40 AM  5/16/2021     Patient is completely alert and awake on my examination  Unclear reason for admission unless it is for placement  We will continue to follow  I personally saw, examined and provided care for the patient. Radiographs, labs and medication list were reviewed by me independently. The case was discussed in detail and plans for care were established. Review of [address] [place] [address], documentation was conducted and revisions were made as appropriate directly by me. I agree with the above documented exam, problem list, and plan of care.      Kuldeep Aguirre MD  5:54 PM  5/16/2021

## 2021-05-17 NOTE — CARE COORDINATION
Pt admitted from [facility]; spoke with liaison Irineo Obando; private pay, can return when medically stable, no covid test to return. Left VM for pt's NICOLLE Moody via phone [phone] to confirm discharge plan is for pt to return to [facility] with my contact information. Envelope and ambulance form completed in soft-chart.

## 2021-05-17 NOTE — PROGRESS NOTES
OCCUPATIONAL THERAPY INITIAL EVALUATION      Date:2021  Patient Name: Cayla Mejia  MRN: [id number]  : 1932  Room: [address] [place], [place] [address]A    Evaluating OT: ROBERT Norman, OTR/L [id number]    AM-PAC Daily Activity Raw Score:   Recommended Adaptive Equipment: none     Diagnosis: AMS  Referring Practitioner: TIKA Tate CNP  Surgery: n/a   Pertinent Medical History: HTN, PVD, dementia   Precautions:  Falls, pureed diet, O2, TSM, contact     Home Living: Pt presents from [facility]. Pain Level: 0/10  Cognition: A&O: 1/4 (self); Follows 1 step directions, with repetition and increased time   Memory:  poor   Sequencing:  poor   Problem solving:  poor   Judgement/safety:  poor    Functional Assessment:   Initial Eval Status  Date: 21 Treatment Status  Date: Short Term Goals  Treatment frequency: 2-3 x/week   Feeding Dep (total assist to feed/drink. Pt demonstrated a wet cough and only 1 small bite of food was given to pt. RN notified of possible aspiration precautions)   Mod A   Grooming Dep (hand over hand assist for facial washing)  Mod A   UB Dressing dep   Max A   LB Dressing Dep (assist with socks)  Max A    Bathing Dep (simulated)  Max A    Toileting Dep (2 person)  Max A   Bed Mobility  Log roll: NT  Supine to sit: NT   Sit to supine: NT   Log roll Max A  Supine to sit: Max A   Sit to supine: Max A   Functional Transfers Sit to stand:NT   Stand to sit:NT  Commode: NT  TBD   Functional Mobility NT  TBD   Balance Sitting: NT  Standing: NT     Activity Tolerance poor     Visual/  Perceptual Glasses: no                UE ROM: BUE: pt's hands appeared arthritic, shoulder flex grossly 10'  Strength: RUE: grossly 2/5 LUE: grossly 2/5   Strength: poor  Fine Motor Coordination: poor    Hearing: WFL  Sensation:  c/o numbness/tingling in B hands  Tone: WFL  Edema: BLEs                            Comments:Cleared by RN to see pt.  Upon arrival, patient supine in bed and agreeable to OT session. At end of session, patient sitting upright in bed with call light and phone within reach, all lines and tubes intact. Pt would benefit from continued OT to increase functional independence and quality of life. Treatment: Required re-orientation to year/month/place. Pt required vc's for proper technique/safety with hand placement/body mechanics/posture for bed mobility/ADLs. Max x 2 for rolling for toileting task and repositioning to [facility] for ease with self feeding. Pt required vc's for sequencing/initiation of ADLs/functional transfers. Pt only able to keep eyes open ~75% of session. Pt required increased time with feeding task and RN notified of further safety concerns. Pt required skilled monitoring of SpO2 during session and education on energy conservation techniques. SpO2 92% on 1L. Pt instructed on use of call light for assistance and fall prevention. Continue to educate. Eval Complexity: moderate  · History: Expanded chart review of medical records and additional review of physical, cognitive, or psychosocial history related to current functional performance  · Exam: 3+ performance deficits  · Assistance/Modification: mod/max assistance or modifications required to perform tasks. May have comorbidities that affect occupational performance. Assessment of current deficits   Functional mobility [x]  ADLs [x] Strength [x]  Cognition []  Functional transfers  [x] IADLs [x] Safety Awareness [x]  Endurance [x]  Fine Motor Coordination [] Balance [x] Vision/perception [] Sensation []   Gross Motor Coordination [] ROM [] Delirium []                  Motor Control []    Plan of Care:     Instruction/training on adapted ADL techniques and AE recommendations to increase functional independence within precautions  Training on energy conservation strategies/techniques to improve independence/tolerance for self-care routine  Functional transfer/mobility training/DME recommendations for increased independence, safety, and fall prevention  Patient/Family education to increase follow through with safety techniques and functional independence  Recommendation of environmental modifications for increased safety with functional transfers/mobility and ADLs  Cognitive retraining/development of therapeutic activities to improve problem solving, judgement, memory, and attention for increased safety/participation in ADL/IADL tasks  Therapeutic exercise to improve motor endurance, ROM, and functional strength for ADLs/functional transfers  Therapeutic activities to facilitate/challenge dynamic balance, stand tolerance, fine motor dexterity/in-hand manipulation for increased independence with ADLs  Neuro-muscular re-education: facilitation of righting/equilibrium reactions, midline orientation, scapular stability/mobility, normalization of muscle tone, and facilitation of volitional active controled movement  Delirium prevention/treatment    2-3 DAYS/WK FOR 1-2 WEEKS prn    Rehab Potential: Good for established goals, pt. assisted in establishment of goals. LTG: maximize independence with ADLs to return to PLOF    Patient  instructed on diagnosis, prognosis/goals and plan of care. Demonstrated poor understanding. [] Malnutrition indicators have been identified and nursing has been notified to ensure a dietitian consult is ordered. Evaluation time includes thorough review of current medical information, gathering information on past medical & social history & PLOF, completion of standardized testing, informal observation of tasks, consultation with other medical professions/disciplines, assessment of data & development of POC/goals.      moderate Evaluation +     Treatment Time In: 1030        Treatment Time Out: 1050           Treatment Charges: Mins Units   Ther Ex  65264       Manual Therapy 96380       Thera Activities 03723       ADL/Home Mgt 59435 20 1   Neuro Re-ed 91265       Group Therapy        Orthotic manage/training  30735       Non-Billable Time       Total Timed Treatment 20 [address] [place], [place], OTR/L [id number]

## 2021-05-18 NOTE — PROGRESS NOTES
evaluation.       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 ([address]    Closed displaced supracondylar fracture with intracondylar extension of lower end of right femur with malunion    Osteoarthritis of right knee    LETHA (acute kidney injury) ([address]    PVD (peripheral vascular disease) ([address]    GERD (gastroesophageal reflux disease)    History of bleeding ulcers    Metabolic acidosis    Hyperuricemia    Acute cystitis without hematuria    Sepsis secondary to UTI ([address]    Elevated lactic acid level    Delirium, acute    Anemia, macrocytic    Dementia (HCC)    AMS (altered mental status)    Sepsis ([address]    Hypoxia    Hypothyroidism    Essential hypertension    Abnormal urinalysis    Healthcare-associated pneumonia    Atrial fibrillation ([address]    Elevated liver enzymes    Proctitis    Constipation       Sreekanth Cerrato CCC-SLP  Speech-Language Pathologist  [id number]  5/18/2021

## 2021-05-18 NOTE — PROGRESS NOTES
Chief Complaint:  Chief Complaint   Patient presents with    Altered Mental Status     sent in by ecf for unresponsive to verbal and tactice stimuli. pt is awake and at her baseline per ecf. not making sense and oriented to  self only. pt given narcan per ems     AMS (altered mental status)     Subjective:    She is awake and conversant but her speech is very slurred. She is a terribly poor historian. I spoke to her [address], her . Unfortunately her brother also has memory issues and also resides in a nursing home. Keron Solares tells me he saw her about a month ago and neurologically she was similar to now - slurred speech and very confused / demented. Objective:    BP (!) 106/57   Pulse 87   Temp 96.9 °F (36.1 °C) (Temporal)   Resp 24   Ht 5' 6\" (1.676 m)   Wt 208 lb 3 oz (94.4 kg)   SpO2 93%   BMI 33.60 kg/m²     Current medications that patient is taking have been reviewed. General appearance: NAD, conversant, obese and chronically ill-appearing  HEENT: AT/NC, MMM. Wet oral secretions  Neck: FROM, supple  Lungs: Clear to auscultation, WOB normal  CV: RRR, no MRGs  Abdomen: Soft, non-tender; no masses or HSM, +BS  Extremities: No peripheral edema or digital cyanosis  Skin: no rash, lesions or ulcers  Psych: Calm and cooperative  Neuro: Awake, conversant, very dysarthric. Handgrips equal bilaterally. Does not really participate with remainder of neurologic exam.  Mumbles unintelligibly when I asked orientation questions. Certainly seems disoriented.     Labs:  CBC with Differential:    Lab Results   Component Value Date    WBC 9.2 05/18/2021    RBC 2.92 05/18/2021    HGB 10.2 05/18/2021    HCT 32.8 05/18/2021     05/18/2021    .3 05/18/2021    MCH 34.9 05/18/2021    MCHC 31.1 05/18/2021    RDW 15.6 05/18/2021    NRBC 0.9 05/17/2021    SEGSPCT 66 12/06/2013    LYMPHOPCT 20.0 05/18/2021    MONOPCT 2.6 05/18/2021    BASOPCT 0.1 05/18/2021    MONOSABS 0.28 05/18/2021 LYMPHSABS 1.84 05/18/2021    EOSABS 0.00 05/18/2021    BASOSABS 0.00 05/18/2021     CMP:    Lab Results   Component Value Date     05/18/2021    K 3.6 05/18/2021    K 4.6 05/16/2021     05/18/2021    CO2 19 05/18/2021    BUN 36 05/18/2021    CREATININE 0.9 05/18/2021    GFRAA >60 05/18/2021    LABGLOM 59 05/18/2021    GLUCOSE 71 05/18/2021    GLUCOSE 100 05/21/2012    PROT 6.2 05/18/2021    LABALBU 2.9 05/18/2021    LABALBU 4.2 05/21/2012    CALCIUM 9.0 05/18/2021    BILITOT 0.3 05/18/2021    ALKPHOS 115 05/18/2021    AST 24 05/18/2021    ALT 24 05/18/2021        Assessment/Plan:  Principal Problem:    AMS (altered mental status)  Active Problems:    Dementia (HCC)    Sepsis (HCC)    Hypoxia    Hypothyroidism    Essential hypertension    Abnormal urinalysis    Healthcare-associated pneumonia    Atrial fibrillation (HCC)    Elevated liver enzymes    Proctitis  Resolved Problems:    * No resolved hospital problems. *       Continue meropenem for UTI versus pneumonia    Stop the rivastigmine, she has lots of oral secretions that will predispose to aspiration.   Give single dose robinul    Keep NPO    Resume tube feeds    MBS with SLP    Continue metoprolol for atrial fibrillation    D/c IV fluids since we're resuming TF's    BP well controlled    Requires continued inpatient level of care   Joey Taveras MD    9:50 AM  5/18/2021

## 2021-05-18 NOTE — DISCHARGE INSTR - COC
fracture with intracondylar extension of lower end of right femur with malunion S72.461P    Osteoarthritis of right knee M17.11    LETHA (acute kidney injury) ([address] N17.9    PVD (peripheral vascular disease) ([facility]) I73.9    GERD (gastroesophageal reflux disease) K21.9    History of bleeding ulcers C79.66    Metabolic acidosis B80.0    Hyperuricemia E79.0    Acute cystitis without hematuria N30.00    Sepsis secondary to UTI ([facility]) A41.9, N39.0    Elevated lactic acid level R79.89    Delirium, acute R41.0    Anemia, macrocytic D53.9    Dementia ([facility]) F03.90    AMS (altered mental status) R41.82    Sepsis ([address] A41.9    Hypoxia R09.02    Hypothyroidism E03.9    Essential hypertension I10    Abnormal urinalysis R82.90    Healthcare-associated pneumonia J18.9    Atrial fibrillation ([facility]) I48.91    Elevated liver enzymes R74.8    Proctitis K62.89    Constipation K59.00       Isolation/Infection:   Isolation          Contact        Patient Infection Status     Infection Onset Added Last Indicated Last Indicated By Review Planned Expiration Resolved Resolved By    ESBL (Extended Spectrum Beta Lactamase) 05/15/21 05/17/21 05/15/21 Culture, Urine        Ecoli urine 5/15/21    Resolved    COVID-19 Rule Out 05/15/21 05/15/21 05/15/21 COVID-19, Rapid (Ordered)   05/15/21 Rule-Out Test Resulted    COVID-19 Rule Out 12/28/20 12/28/20 12/28/20 COVID-19 (Ordered)   12/28/20 Rule-Out Test Resulted    COVID-19 Rule Out 10/30/20 10/30/20 10/30/20 Covid-19 Ambulatory (Ordered)   11/01/20 Rule-Out Test Resulted    COVID-19 Rule Out 10/27/20 10/28/20 10/27/20 Covid-19 Ambulatory (Ordered)   10/30/20 Rule-Out Test Resulted    COVID-19 Rule Out 09/14/20 09/14/20 09/14/20 Covid-19 Ambulatory (Ordered)   09/16/20 Rule-Out Test Resulted    COVID-19 Rule Out 09/08/20 09/08/20 09/08/20 Covid-19 Ambulatory (Ordered)   09/09/20 Rule-Out Test Resulted    COVID-19 Rule Out 09/01/20 09/02/20 09/01/20 Covid-19 Ambulatory (Ordered) 20 Rule-Out Test Resulted    COVID-19 Rule Out 20 Covid-19 Ambulatory (Ordered)   20 Rule-Out Test Resulted    COVID-19 Rule Out 20 Covid-19 Ambulatory (Ordered)   20 Rule-Out Test Resulted    COVID-19 Rule Out 20 Covid-19 Ambulatory (Ordered)   20 Rule-Out Test Resulted    COVID-19 20 Covid-19 Ambulatory   20     COVID-19 Rule Out 20 Covid-19 Ambulatory (Ordered)   20 Rule-Out Test Resulted    ESBL (Extended Spectrum Beta Lactamase) 20 Culture, Urine   21 Brain Deyvi RN    E Coli Urine 20, 20          Nurse Assessment:  Last Vital Signs: BP (!) 106/57   Pulse 87   Temp 96.9 °F (36.1 °C) (Temporal)   Resp 24   Ht 5' 6\" (1.676 m)   Wt 208 lb 3 oz (94.4 kg)   SpO2 93%   BMI 33.60 kg/m²     Last documented pain score (0-10 scale): Pain Level: 0  Last Weight:   Wt Readings from Last 1 Encounters:   21 208 lb 3 oz (94.4 kg)     Mental Status:  disoriented and confused    IV Access:  - None    Nursing Mobility/ADLs:  Walking   N/A  Transfer  Dependent  Bathing  Dependent  Dressing  Dependent  Toileting  Dependent  Feeding  Dependent  Med Admin  Dependent  Med Delivery   Crushed & given via PEG tube    Wound Care Documentation and Therapy:  Incision 13 Leg Upper; Lower;Right (Active)   Number of days: 6951        Elimination:  Continence:   · Bowel: No   · Bladder: Has indwelling garcia catheter  Urinary Catheter: Insertion Date: 2021   Colostomy/Ileostomy/Ileal Conduit: No       Date of Last BM: 2021    Intake/Output Summary (Last 24 hours) at 2021 0812  Last data filed at 2021 0457  Gross per 24 hour   Intake 0 ml   Output 1050 ml   Net -1050 ml     I/O last 3 completed shifts: In: 0   Out: 1050 [Urine:1050]    Safety Concerns:      At Risk for Falls and Aspiration Risk    Impairments/Disabilities:      cognitively impaired    Nutrition Therapy:  Current Nutrition Therapy:   - Tube Feedings:  Standard with fiber at 60cc/hr. With 30cc free water flush every 4 hours    Routes of Feeding: Gastrostomy Tube  Liquids: N/A  Daily Fluid Restriction: no  Last Modified Barium Swallow with Video (Video Swallowing Test): done on 5/18/2021-attempted,but patient unable to follow commands/***    Treatments at the Time of Hospital Discharge:   Respiratory Treatments: See discharge med-rec  Oxygen Therapy:  is on oxygen at 3 L/min per nasal cannula.   Ventilator:    - No ventilator support    Rehab Therapies: N/A  Weight Bearing Status/Restrictions: N/A  Other Medical Equipment (for information only, NOT a DME order):  hospital bed  Other Treatments: Turn every 2 hours, Mouth care every 4 hours & prn    Patient's personal belongings (please select all that are sent with patient):  None    RN SIGNATURE:  Electronically signed by Sylwia Dempsey RN on 5/20/21 at 3:08 PM EDT    CASE MANAGEMENT/SOCIAL WORK SECTION    Inpatient Status Date: ***    Readmission Risk Assessment Score:  Readmission Risk              Risk of Unplanned Readmission:  31           Discharging to Facility/ Agency   · Name: [facility]  · Address:  · Phone:  · Fax:    Dialysis Facility (if applicable)   · Name:  · Address:  · Dialysis Schedule:  · Phone:  · Fax:    / signature: Electronically signed by AZUL Barry on 5/18/2021 at 8:12 AM      PHYSICIAN SECTION    Prognosis: {Prognosis:6207647038}    Condition at Discharge: [facility] Patient Condition:[id number]}    Rehab Potential (if transferring to Rehab): {Prognosis:5885935117}    Recommended Labs or Other Treatments After Discharge: ***    Physician Certification: I certify the above information and transfer of Juan Francisco Calderón  is necessary for the continuing treatment of the diagnosis listed and that she requires Intermediate Nursing Care for *** 30 days.      Update Admission H&P: {CHP DME Changes in QIPOZ:[id number]}    PHYSICIAN SIGNATURE:  {Esignature:[id number]}

## 2021-05-18 NOTE — PROGRESS NOTES
session, patient in bed with  call light and phone within reach,  all lines and tubes intact, nursing notified. Treatment:  Patient practiced and was instructed in the following treatment:    · Bed mobility training - pt given verbal and tactile cues to facilitate proper sequencing and safety during rolling and supine>sit as well as provided with physical assistance to complete task      · Therapeutic exercise was completed to improve strength of BLE to improve functional mobility status. Pt's/ family goals   1. None stated    Patient and or family understand(s) diagnosis, prognosis, and plan of care. yes    PLAN OF CARE:    PT care will be provided in accordance with the objectives noted above. Exercises and functional mobility practice will be used as well as appropriate assistive devices or modalities to obtain goals. Patient and family education will also be administered as needed. Current Treatment Recommendations     [x] Strengthening     [x] ROM   [x] Balance Training   [x] Endurance Training   [x] Transfer Training   [x] Gait Training   [x] Stair Training   [] Positioning   [] Safety and Education Training   [] Patient/Caregiver Education   [] HEP  [] Other     Frequency of treatments: 2-5x/week x 1-2 weeks. Time in  1345  Time out  1405    Total Treatment Time  20 minutes     Evaluation Time includes thorough review of current medical information, gathering information on past medical history/social history and prior level of function, completion of standardized testing/informal observation of tasks, assessment of data and education on plan of care and goals.     CPT codes:  [x] Low Complexity PT evaluation 70766  [] Moderate Complexity PT evaluation 25055  [] High Complexity PT evaluation 78781  [] PT Re-evaluation 16741  [] Gait training 40952  minutes  [] Manual therapy 23441  minutes  [x] Therapeutic activities 27385 10 minutes  [] Therapeutic exercises 68701 - minutes  [] [facility] 20[facility] - minutes       Leland Rose, [address] [facility]

## 2021-05-19 NOTE — CONSULTS
([facility] [I48.91] 05/15/2021    Elevated liver enzymes [R74.8] 05/15/2021    Proctitis [K62.89] 05/15/2021    Dementia ([facility] [F03.90] 12/28/2020       Details of Conversation:  Reviewed patient's chart. Reviewed patient with Staff RN. Patient resides in [facility]. Patient ws sent in by the nursing facility d/t patient not arousable to tactile or verbal stimuli by staff. Patient normally able to say a few words, and is alert to self at baseline. Saw patient at the bedside. Patient only able to moan at this time. Patient with mouth open, tongue dry. TF infusing, meropenem infusing. No family at the bedside. Call placed to [address] [place] [address]. Explained the patient health status. Patient changed to Limited- NO to ALL. Will alert CM to call [facility] tomorrow to see who they use for Hospice at their facility, and they will be consulted on the patient's behalf. Fransisco Troncoso thanked me for my time. Rissa Bella stated that he was able to speak to the patient's brother yesterday. Patient's brother Lester Nuñez stated that the patient said she wanted to die.      -CM- contact [facility] for Hospice    Physical Function:  PPS: 40        OBJECTIVE:   Prognosis: Guarded    Physical Exam:  /71   Pulse 94   Temp 97.9 °F (36.6 °C) (Axillary)   Resp (!) 32   Ht 5' 6\" (1.676 m)   Wt 208 lb 3 oz (94.4 kg)   SpO2 95%   BMI 33.60 kg/m²   Constitutional:  Elderly, obese, NAD, moaning  Eyes: no scleral icterus, normal lids, no discharge  ENMT:  Normocephalic, atraumatic, mucosa dry  Neck:  trachea midline, no JVD  Lungs:  Diminished bilaterally, no audible rhonchi or wheezes noted, respirations unlabored, no retractions, 3 L NC  Heart[de-identified]  RRR, distant heart tones, no murmur, rub, or gallop noted during exam  Abd:  Soft, non tender, non distended, bowel sounds present  :  Catheter  MSK: sarcopenia present  Ext: not moving extremities, no edema, pulses present  Skin:  Warm and dry, no rashes on visible skin  Psych: non-anxious

## 2021-05-19 NOTE — PROGRESS NOTES
Comprehensive Nutrition Assessment    Type and Reason for Visit:  Initial    Nutrition Recommendations/Plan: Restart TF when medically appropriate  Recommend Standard w/ Fiber @ 60 ml/hr to provide 1440 ml tv, 1728 kcal, 80 gm pro, 1162 ml free water    Nutrition Assessment:  Pt adm w/ AMS w/ underlying dementia. Pt now s/p RRT for hypotension, s/p failed MBS w/ pre-existing PEG now w/ noted milky drainage. Will provide updated TF recs and monitor    Malnutrition Assessment:  Malnutrition Status:  No malnutrition    Context:  Chronic Illness     Findings of the 6 clinical characteristics of malnutrition:  Energy Intake:  Mild decrease in energy intake (Comment)  Weight Loss:  No significant weight loss     Body Fat Loss:  No significant body fat loss     Muscle Mass Loss:  No significant muscle mass loss    Fluid Accumulation:  No significant fluid accumulation     Strength:  Not Performed    Estimated Daily Nutrient Needs:  Energy (kcal):   (MSJ 1368 x 1.2 SF); Weight Used for Energy Requirements:  Admission     Protein (g):  80-90 (1.3-1.5 gm/kg IBW);  Weight Used for Protein Requirements:  Ideal        Fluid (ml/day):  ; Method Used for Fluid Requirements:  1 ml/kcal      Nutrition Related Findings:  dementia, soft abd, active BS, PEG LUQ, +2-3 edema, -I/Os      Wounds:  None       Current Nutrition Therapies:    Current Tube Feeding (TF) Orders:  · Feeding Route: PEG (w/ milky drainage)  · Formula: Standard w/Fiber  · Schedule: Continuous (ordered incorrectly as bolus, 85 ml/hr, 2040 ml tv)  · Current TF & Flush Orders Provides: not currently running per doc flow  · Goal TF & Flush Orders Provides: 2448 kcal, 113 gm pro, 1646 ml free water      Anthropometric Measures:  · Height: 5' 6\" (167.6 cm)  · Current Body Weight: 203 lb (92.1 kg) (first measured 5/15, noted bed wt 208# 5/17 likely r/t fluids, UTO updated wt d/t iso precautions)   · Admission Body Weight: 203 lb (92.1 kg) (bed scale 5/15)    · Usual Body Weight: 172 lb (78 kg) (actual per EMR 08/2020)     · Ideal Body Weight: 130 lbs; % Ideal Body Weight 156.2 %   · BMI: 32.8  · BMI Categories: Obese Class 1 (BMI 30.0-34. 9)       Nutrition Diagnosis:   · Inadequate oral intake related to cognitive or neurological impairment as evidenced by NPO or clear liquid status due to medical condition, nutrition support - enteral nutrition      Nutrition Interventions:   Food and/or Nutrient Delivery:  Modify Tube Feeding (Recommend Standard w/ Fiber @ 60 ml/hr)  Nutrition Education/Counseling:  Education not indicated   Coordination of Nutrition Care:  Continue to monitor while inpatient    Goals: Tolerance to TF at goal rate       Nutrition Monitoring and Evaluation:   Food/Nutrient Intake Outcomes:  Enteral Nutrition Intake/Tolerance  Physical Signs/Symptoms Outcomes:  Biochemical Data, GI Status, Fluid Status or Edema, Nutrition Focused Physical Findings, Skin, Weight, Hemodynamic Status     Discharge Planning:     Too soon to determine     Electronically signed by Hailee Paulson, MS, RD, LD on 5/19/21 at 1:24 PM EDT    Contact: [phone]

## 2021-05-19 NOTE — CARE COORDINATION
Transition of Care-Pt admitted from [facility]; spoke with liaison John Zaldivar; private pay, can return when medically stable, no covid test to return. Left VM for pt's NICOLLE Moody via phone [phone] to confirm discharge plan is for pt to return to [facility] with my contact information. Patient was an RRT overnight for hypotension, continues on Merrem Q12 for ESBL urine. Envelope/ambulance in soft chart.     Pastora Gaffney BSN, RN  [place]   [phone]

## 2021-05-19 NOTE — SIGNIFICANT EVENT
Rapid Response Team Note  Date of event: 5/19/2021   Time of event: Dajuan Montilla 80y.o. year old female   YOB: 1932   Admit date:  5/15/2021   Location: 46/56-A   Witnessed? : [x]Yes  [] No  Monitored? : [x]Yes  [] No  Code status: [] Full  [x] DNR-CCA  []DNR-CC  ______________________________________________________________________  Reason for RRT:    [] RR < 8     [] RR > 28   [] SpO2 <90%   [] HR < 40 bpm   [] HR > 130 bpm  [x] SBP < 90 mmHg    [] SpO2 <90%   [] LOC   [] Seizures    [] Significant Bleeding Event    [] Other:     Subjective:   CTSP regarding the above event, upon arrival, patient is awake but confused, open eyes and responds to name, answers simple questions but does not hold a conversation (her baseline per nursing staff)  VS check upon arrival, /58. HR 97. Patient likely needed a bigger BP cuff for BP checks.  Stat labs, CXR were sent an patient was started on IVF     Objective:   Vital signs: Temp: 97.7/BP: 113/58/RR: 30/HR: 97  Initial Condition:  Conscious   [x] Yes  [] No     Breathing [x] Yes  [] No     Pulse  [x] Yes  [] No    Airway:   [x] Open/ Clear     Intervention: [x] None  [] Pooled secretions     [] Suctioned  [] Stridor      [] Intubation    Lungs:   [x] Symmetrical chest rise/ CTABL Intervention: [] None  [] Use of accessory muscles    [] NIV (CPAP/BiPAP)  [] Cyanosis      [] Nasal Oxygen/Mask  [] Wheezing       [] ABG             [x] CXR  [] Other:    Circulation:   Rhythm:  [] Sinus [] Other:   Intervention: [] None            [] IV Access  [] Peripheral              [] Central            [] EKG            [] Cardioversion            [] Defibrillation     Capillary Refill:  [] > 2 seconds [] < 2 seconds    Neurologic:   [] NIHSS      [] Pupillary Response:    Response to pain:   [x] Yes  [] No  Follow commands:  [x] Yes  [] No  Facial asymmetry:  [] Yes  [x] No  Motor strength:  [x] Equal  [] Focal deficit:  Diagnostic Test:  Blood Sugar:  135 mg/dL  EKG:      ABG:      Lab Results   Component Value Date    PH 7.391 03/03/2021    PCO2 42.0 03/03/2021    PO2 147.5 03/03/2021    HCO3 24.9 03/03/2021    O2SAT 98.8 03/03/2021     Medication(s) Given:  []  Narcan (Naloxone)   []  Romazicon (Flumazenil)    []  Breathing Treatment    []  Steroids (Prednisone, Solu-Medrol)  []  Adenosine  [] Cardiac Medicines:     Infusion(s):   [x] Normal Saline    Amount: 50cc/hr      [] Lactate Ringers      [] Other:     Imaging:   [x] CXR:   [] Normal         [] Pneumothorax         [] Pulmonary Edema  [] Infiltrate          [] CT Head  [] Normal          [] ICB          [] SAH          [] Other:        Teams Assessment and Plan:   Hypotension  -Upon arrival, /58  -Started on IVF 50/hr  -Check CXR and stat labs. ?  ??   Disposition:  [x] No transfer   [] Transfer to monitor floor  [x] Transfer to: [] MICU [] NICU [] CCU [x] Kamla Mejia family updated: [x] Yes  [] No   Discussed with:  [] Critical Care Intensivist:         [] Primary Care Provider: Can Oneal DO      [x] Other:       Michelle Salcido MD  PGY-3  5/19/2021 5:45 AM  Attending Amy Nelson MD

## 2021-05-19 NOTE — PROGRESS NOTES
BASOPCT 0.2 05/19/2021    MONOSABS 0.40 05/19/2021    LYMPHSABS 1.72 05/19/2021    EOSABS 0.00 05/19/2021    BASOSABS 0.00 05/19/2021     CMP:    Lab Results   Component Value Date     05/19/2021    K 3.9 05/19/2021    K 4.6 05/16/2021     05/19/2021    CO2 22 05/19/2021    BUN 31 05/19/2021    CREATININE 0.9 05/19/2021    GFRAA >60 05/19/2021    LABGLOM 59 05/19/2021    GLUCOSE 132 05/19/2021    GLUCOSE 100 05/21/2012    PROT 6.3 05/19/2021    LABALBU 3.1 05/19/2021    LABALBU 4.2 05/21/2012    CALCIUM 9.6 05/19/2021    BILITOT 0.3 05/19/2021    ALKPHOS 111 05/19/2021    AST 22 05/19/2021    ALT 21 05/19/2021        Assessment/Plan:  Principal Problem:    AMS (altered mental status)  Active Problems:    Dementia (HCC)    Sepsis (HCC)    Hypoxia    Hypothyroidism    Essential hypertension    Abnormal urinalysis    Healthcare-associated pneumonia    Atrial fibrillation (HCC)    Elevated liver enzymes    Proctitis  Resolved Problems:    * No resolved hospital problems. *       Continue meropenem for UTI versus pneumonia    Stay off rivastigmine due to secretions and concern for aspiration    Keep NPO    Continue tube feeds    CXR personally reviewed, doesn't appear any worse, actually basically clear        Check ABG    I think she's declining and likely nearing end of life. Will d/w Fransisco once I get a hold of him. Will consult Palliative Care as well.     Continue metoprolol for atrial fibrillation    BP well controlled    Requires continued inpatient level of care   Perry Gonzales MD    11:03 AM  5/19/2021

## 2021-05-20 NOTE — CARE COORDINATION
Transition of care-Hospice consult noted-request for CM to touch base with facility for preference, spoke to liaison, requested HOTV-consult called to [facility], [facility], NOLAN RN to see patient later today here or at facility. NICOLLE Moody changed code status yesterday to [facility], agreeable to plan to return to facility under hospice services. [facility] set up for 3:30 PM today to return patient to [facility]. Call placed to Storm Alvarado to update, facility and RN aware.     Pastora Gaffney BSN, RN  [place]   [phone]

## 2021-05-20 NOTE — PROGRESS NOTES
Call placed to Keron Solares [place]. He was not able to answer and I left a message. Will await call back.

## 2021-05-20 NOTE — DISCHARGE SUMMARY
Physician Discharge Summary     Patient ID:  Perfecto Khan  [id number]  88 y.o.  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 ([facility]    Closed displaced supracondylar fracture with intracondylar extension of lower end of right femur with malunion    Osteoarthritis of right knee    LETHA (acute kidney injury) ([facility]    PVD (peripheral vascular disease) ([facility]    GERD (gastroesophageal reflux disease)    History of bleeding ulcers    Metabolic acidosis    Hyperuricemia    Acute cystitis without hematuria    Sepsis secondary to UTI (HCC)    Elevated lactic acid level    Delirium, acute    Anemia, macrocytic    Dementia (HCC)    AMS (altered mental status)    Sepsis ([facility]    Hypoxia    Hypothyroidism    Essential hypertension    Abnormal urinalysis    Healthcare-associated pneumonia    Atrial fibrillation (HCC)    Elevated liver enzymes    Proctitis    Constipation       Discharge Diagnoses: Sepsis    Consults: Palliative Medicine    Procedures: None    Hospital Course: The patient is a 80 y.o. female of [place]key Can[place], DO with significant past medical history of GERD, HTN, PVD, and LETHA who presents with altered mental status. Patient had UTI on meropenem, failed swallow must use peg for tube feeds. Off rivastigmine due to secretions and concern for aspiration. Consulted palliative medicine, patient to return to [facility] with hospice.      Recent Labs     05/18/21  0515 05/19/21  0530 05/19/21  1029   WBC 9.2 10.1 10.3   HGB 10.2* 10.2* 10.2*   HCT 32.8* 33.4* 32.0*    297 297       Recent Labs     05/18/21  0515 05/19/21  0530 05/19/21  1029    148* 146   K 3.6 3.9 3.5   * 114* 112*   CO2 19* 22 21*   BUN 36* 31* 30*   CREATININE 0.9 0.9 0.8 CALCIUM 9.0 9.6 9.1       CT Head WO Contrast    Result Date: 5/15/2021  EXAMINATION: CT OF THE HEAD WITHOUT CONTRAST  5/15/2021 9:10 am TECHNIQUE: CT of the head was performed without the administration of intravenous contrast. Dose modulation, iterative reconstruction, and/or weight based adjustment of the mA/kV was utilized to reduce the radiation dose to as low as reasonably achievable. COMPARISON: February 27, 2021. HISTORY: ORDERING SYSTEM PROVIDED HISTORY: AMS TECHNOLOGIST PROVIDED HISTORY: Reason for exam:->AMS Has a \"code stroke\" or \"stroke alert\" been called? ->No Decision Support Exception - unselect if not a suspected or confirmed emergency medical condition->Emergency Medical Condition (MA) What reading provider will be dictating this exam?->CRC FINDINGS: BRAIN/VENTRICLES: There is no acute intracranial hemorrhage, mass effect or midline shift. No abnormal extra-axial fluid collection. The gray-white differentiation is maintained without evidence of an acute infarct. There is no evidence of hydrocephalus. Generalized volume loss and chronic small vessel disease noted. ORBITS: The visualized portion of the orbits demonstrate no acute abnormality. SINUSES: There is minimal opacification of all the visualized paranasal sinuses. Mastoid air cells are clear. SOFT TISSUES/SKULL:  No acute abnormality of the visualized skull or soft tissues. No acute intracranial abnormality. CT CHEST W CONTRAST    Result Date: 5/15/2021  EXAMINATION: CT OF THE CHEST WITH CONTRAST; CT OF THE ABDOMEN AND PELVIS WITH CONTRAST 5/15/2021 11:06 am TECHNIQUE: CT of the chest was performed with the administration of intravenous contrast. Multiplanar reformatted images are provided for review.  Dose modulation, iterative reconstruction, and/or weight based adjustment of the mA/kV was utilized to reduce the radiation dose to as low as reasonably achievable.; CT of the abdomen and pelvis was performed with the administration of intravenous contrast. Multiplanar reformatted images are provided for review. Dose modulation, iterative reconstruction, and/or weight based adjustment of the mA/kV was utilized to reduce the radiation dose to as low as reasonably achievable. COMPARISON: 01/08/2017 HISTORY: ORDERING SYSTEM PROVIDED HISTORY: hypoxia TECHNOLOGIST PROVIDED HISTORY: Reason for exam:->hypoxia Decision Support Exception - unselect if not a suspected or confirmed emergency medical condition->Emergency Medical Condition (MA) What reading provider will be dictating this exam?->CRC FINDINGS: There is borderline cardiac size. There is mild coronary artery calcification. The great vessels are normal.  The trachea and major bronchi are patent. There is minimal persistent atelectasis/infiltrates in the lower lobes bilaterally, right middle lobe and lingula. Liver is of normal architecture. Gallbladder is distended. A previously noted nearly 1.1 cm nodule adjacent to the pancreatic head is noted without change and continued surveillance is  recommended. A G-tube is noted in the stomach. Nearly 30% compression deformity of T4 and T12 are identified. Severe degenerative changes are noted in the shoulders bilaterally. An asymmetric soft tissue density is identified in the right breast.  Please correlate with mammography. Persistent atelectasis in the lower lobes, right middle lobe and lingula. Developing pneumonia is considered. Asymmetric soft tissue density in the right breast.  Consider mammography. Persistent nodular density adjacent to the pancreatic head probably lymph node. Surveillance recommended. CT ABDOMEN AND PELVIS. Comparison August 18, 2020 Findings The liver is of normal architecture gallbladder is distended. Consider ultrasonography. Spleen, and pancreas appear normal.  The previously noted 1.1 cm nodule somewhat exophytic to the head of the pancreas is noted without change.   Adrenals and the kidneys are normal except for bilateral renal cyst, the larger 1 on the right side measuring 3.5 cm. Compression deformity of T12 is noted. Pelvis. Bladder is partially contracted with wall thickening. Please correlate with urinalysis. Colon is tortuous and redundant with constipation. There is distended cecum with wall thickening and presacral edema concerning for proctitis. Appendix cannot be identified. Impression Constipation with distended rectum with wall thickening and presacral edema concerning for proctitis. Distended gallbladder with mild wall thickening. Please correlate with ultrasonography to evaluate for cholecystitis. Persistent nodular density adjacent the pancreas. Consider surveillance. CT ABDOMEN PELVIS W IV CONTRAST Additional Contrast? None    Result Date: 5/15/2021  EXAMINATION: CT OF THE CHEST WITH CONTRAST; CT OF THE ABDOMEN AND PELVIS WITH CONTRAST 5/15/2021 11:06 am TECHNIQUE: CT of the chest was performed with the administration of intravenous contrast. Multiplanar reformatted images are provided for review. Dose modulation, iterative reconstruction, and/or weight based adjustment of the mA/kV was utilized to reduce the radiation dose to as low as reasonably achievable.; CT of the abdomen and pelvis was performed with the administration of intravenous contrast. Multiplanar reformatted images are provided for review. Dose modulation, iterative reconstruction, and/or weight based adjustment of the mA/kV was utilized to reduce the radiation dose to as low as reasonably achievable. COMPARISON: 01/08/2017 HISTORY: ORDERING SYSTEM PROVIDED HISTORY: hypoxia TECHNOLOGIST PROVIDED HISTORY: Reason for exam:->hypoxia Decision Support Exception - unselect if not a suspected or confirmed emergency medical condition->Emergency Medical Condition (MA) What reading provider will be dictating this exam?->CRC FINDINGS: There is borderline cardiac size. There is mild coronary artery calcification.   The great pancreas. Consider surveillance. US GALLBLADDER RUQ    Result Date: 5/15/2021  EXAMINATION: RIGHT UPPER QUADRANT ULTRASOUND 5/15/2021 6:09 pm COMPARISON: None. HISTORY: ORDERING SYSTEM PROVIDED HISTORY: Evaluate for Cholecystitis TECHNOLOGIST PROVIDED HISTORY: Reason for exam:->Evaluate for Cholecystitis What reading provider will be dictating this exam?->CRC FINDINGS: LIVER:  The liver demonstrates normal echogenicity without evidence of intrahepatic biliary ductal dilatation. BILIARY SYSTEM:  Multiple gallstones. No pericholecystic fluid, wall thickening or stones. Negative sonographic Ashley's sign. Common bile duct is within normal limits measuring . OTHER: No evidence of right upper quadrant ascites. Unremarkable right upper quadrant ultrasound. Multiple gallstones in an otherwise normal-appearing gallbladder. Normal common bile duct. XR CHEST PORTABLE    Result Date: 5/15/2021  EXAMINATION: ONE XRAY VIEW OF THE CHEST 5/15/2021 7:09 am COMPARISON: 03/03/2021 HISTORY: ORDERING SYSTEM PROVIDED HISTORY: ams TECHNOLOGIST PROVIDED HISTORY: Reason for exam:->ams What reading provider will be dictating this exam?->CRC FINDINGS: There is elevation of the right hemidiaphragm with overlying atelectasis/scarring. No focal infiltrate, pleural effusion or pneumothorax is demonstrated. The heart is enlarged. No acute osseous abnormality is seen. Chronic elevation of the right hemidiaphragm. No acute cardiopulmonary disease. Discharge Exam:    HEENT: NCAT,  PERRLA, No JVD Oral secretions  Heart:  RRR, no murmurs, gallops, or rubs.   Lungs:  CTA bilaterally, no wheeze, rales or rhonchi  Abd: bowel sounds present, nontender, nondistended, no masses, peg placement  Extrem:  No clubbing, cyanosis, or edema    Disposition: [facility]     Patient Condition at Discharge: Stable    Patient Instructions:      Medication List      START taking these medications    ipratropium-albuterol 0.5-2.5 (3) MG/3ML Soln

## 2021-05-20 NOTE — PROGRESS NOTES
Call placed to [facility]- his  states he may have stepped out of the office as he was not answering his phone. She connected me to his voicemail and I left another message.

## 2021-05-20 NOTE — PROGRESS NOTES
[facility] OF THE [facility] Information Visit Note              Patient Name: Alejo Montilla   :  1932  MRN:  [id number]  Admit date:  5/15/2021   Hospital Admitting Physician:  Antonella Webb MD   PCP:  Africa James DO  Primary Insurance: Payor: [place] /  /  /    Emergency Contact:      Contact/Relation:   /         Phone:     Advance Directive  Patient has completed an advance directive   Discussed with: Other Caregiver  DPOA-HC Name-Relation:Healthcare Agent's Name: Patrica Can Agent's Phone Number: [phone]    Terminal Diagnosis vascular dementia as confirmed by Dr. 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 ([facility] F02.80    Closed displaced supracondylar fracture with intracondylar extension of lower end of right femur with malunion S72.461P    Osteoarthritis of right knee M17.11    LETHA (acute kidney injury) ([facility] N17.9    PVD (peripheral vascular disease) ([facility]) I73.9    GERD (gastroesophageal reflux disease) K21.9    History of bleeding ulcers S50.36    Metabolic acidosis F69.4    Hyperuricemia E79.0    Acute cystitis without hematuria N30.00    Sepsis secondary to UTI ([facility]) A41.9, N39.0    Elevated lactic acid level R79.89    Delirium, acute R41.0    Anemia, macrocytic D53.9    Dementia ([facility]) F03.90    AMS (altered mental status) R41.82    Sepsis ([facility]) A41.9    Hypoxia R09.02    Hypothyroidism E03.9    Essential hypertension I10    Abnormal urinalysis R82.90    Healthcare-associated pneumonia J18.9    Atrial fibrillation ([facility]) I48.91    Elevated liver enzymes R74.8    Proctitis K62.89    Constipation K59.00       Code Status Order: Limited     Allergies:  Patient has no known allergies.     Family Goal: comfort    Meeting held with spoke

## 2021-05-20 NOTE — PROGRESS NOTES
Consult to NOLAN Momin RN to call Ramana Troncoso today to discuss plan of care.      Electronically signed by Kristen Boogie RN on 5/20/2021 at 11:21 AM

## 2021-06-11 NOTE — CONSULTS
"Progress Note    Brief summary:  Per previous provider summary is\"   70 year  old female with history of HTN, T2DM, chronic atrial fibrillation on warfarin, diastolic CHF, coronary artery disease status post stenting (7/27/2020), peripheral artery disease, calculus cholecystitis status post cholecystectomy, depression, who came to [facility] on 8/11/2020 for abdominal pain and vomiting. CT scan revealed acute diverticulitis with perforation. on 8/14 showed increased size of abscess from 2x2cm to 4x3cm, though pt reports clinical improvement of her symptoms.. General surgery consulted - no current plans for operating room so interventional radiology was consulted for possible drainage which was done on 8/17/2020 by placing CT scan guided drain placement in the left pelvic diverticular abscess, repeat CTAP (8/20) - near complete collapse of fluid cavity.   Patient developed new leukocytosis.  Chest x-ray showed possible hospital-acquired pneumonia.  Due to concern of leukocytosis being from untreated Candida infection (at least improved Ashley dubliniensis), so she was started on  Fluconazole. WBC slowly improving.  On 8/25, patient was noted to be shortness of breath and given 25 lbs weight gain since admission, started on iv lasix.   On 8/26, developed acute kidney injury. Started having episodes of bradycardia. Digoxin level elevated. Started on digifab and transferred to ICU. Required Atropine in ICU for HR as low as 20-40s. Started on dopamine\"    Assessment/Plan  Principal Problem:    Diverticulitis  Active Problems:    Bradyarrhythmia    Acute kidney injury (H)    Diverticulitis of large intestine with perforation and abscess without bleeding    Atrial fibrillation with rapid ventricular response (H)    Obstructive sleep apnea syndrome    Poisoning by digoxin, accidental or unintentional, initial encounter    Acute respiratory failure with hypoxia and hypercapnia (H)    Acute metabolic " Nephrology Consult  [facility]    CC:   Elevated creatinine    HPI:   This is an 22-year-old female patient sent in from a local nursing home for elevated creatinine of 7.1 and . Review of labs shows her creatinine was 0.7 in July of this year. She is very confused and information from her is not reliable. From chart review: she has been confused and has Hx HTN, dementia, anemia due to blood loss, OA and GIB. She is pleasant but very confused, she is in no distress but is not able to participate in any information. 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 ([address]    Closed displaced supracondylar fracture with intracondylar extension of lower end of right femur with malunion    Osteoarthritis of right knee    LETHA (acute kidney injury) ([address]    PVD (peripheral vascular disease) ([address]       Meds:     metoprolol succinate  50 mg Oral Daily    ferrous sulfate  325 mg Oral Daily with breakfast    heparin (porcine)  5,000 Units Subcutaneous Q8H    pantoprazole  40 mg Oral QAM AC        sodium bicarbonate infusion         Meds prn:     diphenhydrAMINE, HYDROcodone-acetaminophen, ondansetron, acetaminophen, mineral oil-hydrophilic petrolatum    Meds prior to admission:     No current facility-administered medications on file prior to encounter.       Current Outpatient Medications on File Prior to Encounter   Medication Sig Dispense Refill    spironolactone-hydroCHLOROthiazide (ALDACTAZIDE) 25-25 MG per tablet Take 1 tablet by mouth daily      calcium carbonate 600 MG TABS tablet Take 2 encephalopathy    Shock circulatory (H)    Metabolic acidosis      1. Digoxin toxicity: bradyarrhythmia, renal failure, hyperkalemia. EKG showed junctional rhythm. Digoxin level of 6. S/p Digifab.  Now in sinus rhythm.  Continue dopamine drip waiting for resolution of dig toxicity.  Low threshold for TV pacing repeat digoxin level pending. Cardiology on board.  2. MAY: Likely due to contrast, dig toxicity, lisinopril with metabolic acidosis and hyperkalemia. due to digoxin. Digoxin stopped. Holding diuretics, lisinopril.  Not making enough urine.  Hemodialysis today.    3. Acute perforated diverticulitis with pericolonic abscess: Initially started on Cipro and Flagyl.  Status post CT-guided drain placement on 8/17, culture growing Candida.  CT abdomen on 8/20/2020 with near complete resolution of the abscess. Has abscessogram 8/5- no residual abscess pocket but has fistula to colon, drain switched to gravity drainage.  Patient is now on meropenem, daptomycin and fluconazole.  vanco switched to dapto on 8/26.    4. Hospital-acquired pneumonia: On meropenem and daptomycin.  Covid negative X 2.   5. Fever : No fevers in last 24 hours . Treating for pneumonia. Drug fever?  6. Leukocytosis: Likely due to above  7. Acute on chronic diastolic CHF: weight is up by 25 lbs since admission. Holding lasix due to MAY, hemodialysis today.  8. Acute hypoxic resp failure: Likely from heart failure as well as pneumonia.  3 L nasal cannula saturating well   9. transaminitis: likely from hepatic congestion with heart failure. Similar events during last admission and in 2018.  10. Acute anemia: Hemoglobin dropped to 6.1 on 8/20.  Status post 1 unit.  Hemoglobin slowly trending down.  Will transfuse if hemoglobin goes below 7  11. Supratherapeutic INR: on presentation, improved with Vit K. INR went up again with liver failure. Worsened today likely from reaction of INR reagent with daptomycin  12. Atrial fibrillation: Had RVR on 8/19.   tablets by mouth daily      Cranberry 500 MG TABS Take 500 mg by mouth daily      metoprolol succinate (TOPROL XL) 50 MG extended release tablet Take 50 mg by mouth daily      HYDROcodone-acetaminophen (NORCO) 5-325 MG per tablet Take 1 tablet by mouth every 12 hours as needed for Pain.  potassium chloride (KLOR-CON M) 10 MEQ extended release tablet Take 10 mEq by mouth daily      mirtazapine (REMERON) 15 MG tablet Take 15 mg by mouth nightly      vitamin D (ERGOCALCIFEROL) 1.25 MG (69992 UT) CAPS capsule Take 50,000 Units by mouth every 14 days Given on the 3rd and the 17 th      omeprazole (PRILOSEC) 20 MG capsule Take 20 mg by mouth daily.  ferrous sulfate 325 (65 FE) MG tablet Take 325 mg by mouth 2 times daily       acetaminophen (TYLENOL) 325 MG tablet Take 650 mg by mouth every 4 hours as needed for Pain or Fever Indications: Pain       therapeutic multivitamin-minerals (THERAGRAN-M) tablet Take 1 tablet by mouth daily.  polyethylene glycol (GLYCOLAX) powder Take 17 g by mouth daily as needed (constipation)          Allergies:    Patient has no known allergies. Social History:     reports that she has never smoked. She has never used smokeless tobacco. She reports that she does not drink alcohol or use drugs. Family History:     History reviewed. No pertinent family history.     ROS:   Patient is confused and could not obtain ROS    Physical Exam:      Patient Vitals for the past 24 hrs:   BP Temp Temp src Pulse Resp SpO2 Weight   08/19/20 1000 132/82 98 °F (36.7 °C) Infrared 93 17 95 % --   08/19/20 0800 (!) 174/95 97 °F (36.1 °C) Infrared 104 16 -- --   08/19/20 0600 118/60 97.1 °F (36.2 °C) Temporal 94 16 96 % --   08/19/20 0454 -- -- -- -- -- -- 174 lb (78.9 kg)   08/19/20 0400 114/60 97.2 °F (36.2 °C) Temporal 88 16 96 % --   08/19/20 0200 (!) 112/52 97.8 °F (36.6 °C) Temporal 90 16 96 % --   08/19/20 0018 (!) 110/52 97.8 °F (36.6 °C) Temporal 93 16 97 % --   08/18/20 2200 (!) 108/52 97.8 °F (36.6 °C) Temporal 96 16 97 % --   08/18/20 2029 -- -- -- -- -- 97 % --   08/18/20 2000 90/70 98 °F (36.7 °C) Temporal 92 16 97 % --   08/18/20 1830 86/74 98 °F (36.7 °C) Temporal 90 16 -- --   08/18/20 1725 115/81 -- -- 88 18 97 % --   08/18/20 1551 110/88 97.9 °F (36.6 °C) -- 79 17 96 % --   08/18/20 1422 (!) 134/99 -- -- -- -- -- --   08/18/20 1417 -- 97.6 °F (36.4 °C) -- 81 18 95 % 180 lb (81.6 kg)         Intake/Output Summary (Last 24 hours) at 8/19/2020 1222  Last data filed at 8/19/2020 1219  Gross per 24 hour   Intake 120 ml   Output 150 ml   Net -30 ml       Constitutional: Patient in no acute distress   Head: normocephalic, atraumatic   Neck: supple, no jvd  Cardiovascular: regular rate and rhythm, no murmurs, gallops, or rubs   Respiratory: Clear, no rales, rhochi, or wheezes,   Gastrointestinal: soft, nontender, nondistended, no hepatosplenomegaly  Ext: no edema   Neuro: pleasantly confused  Skin: dry, no rash       Data:    Recent Labs     08/18/20  1441 08/19/20  0555   WBC 8.2 7.9   HGB 12.7 12.4   HCT 38.8 36.8   .3* 102.2*    398       Recent Labs     08/18/20  0515 08/18/20  1441 08/19/20  0555    137 143   K 3.9 4.6 3.8    98 105   CO2 15* 18* 14*   CREATININE 7.1* 7.1* 7.0*   * 129* 130*   LABGLOM 5 5 6   GLUCOSE 99 228* 109*   CALCIUM 10.2 10.5* 9.9   PHOS  --   --  4.3   MG  --   --  1.5*       Vit D, 25-Hydroxy   Date Value Ref Range Status   07/14/2020 30 30 - 100 ng/mL Final     Comment:     <20 ng/mL. ........... Chris Brinks Deficient  20-30 ng/mL. ......... Chris Brinks Insufficient   ng/mL. ........ Chris Brinks Sufficient  >100 ng/mL. .......... Chris Brinks Toxic         No results found for: PTH    Recent Labs     08/18/20  1441 08/19/20  0555   ALT 14 13   AST 29 15   ALKPHOS 106* 101   BILITOT 0.3 0.4   BILIDIR  --  <0.2       Recent Labs     08/18/20  1441 08/19/20  0555   LABALBU 3.5 3.3*       No results found for: FERRITIN, IRON, TIBC    Vitamin B-12   Date Value Ref Range Digoxin held due to toxicity. Metoprolol held due to bradycardia. Warfarin held due to supratherapeutic INR.  13. Type 2 diabetes mellitus: was on SSI but BG consistently low, will stop SSI for now and monitor  14. CAD: Status post coronary angiogram and PCI to LAD on 7/27/2020    Subjective  Patient seen and examined  Overnight events noted.  Patient was in respiratory distress due to which she was intubated on 8/28/2020 night shift.      Objective    Vital signs in last 24 hours  Temp:  [98  F (36.7  C)-99.4  F (37.4  C)] 98.2  F (36.8  C)  Heart Rate:  [55-92] 79  Resp:  [17-47] 18  BP: ()/(30-66) 86/30  Arterial Line BP: ()/(32-45) 130/37  FiO2 (%):  [30 %-100 %] 40 %  Weight:   214 lb 8 oz (97.3 kg)    Intake/Output last 3 shifts  I/O last 3 completed shifts:  In: 1348.9 [I.V.:1148.9; IV Piggyback:200]  Out: 310 [Urine:310]  Intake/Output this shift:  I/O this shift:  In: 1218 [I.V.:785; NG/GT:180; IV Piggyback:253]  Out: 485 [Urine:485]    Physical Exam   General: drowsy but arousable  Eyes: no pallor  Chest: Clear to auscultation bilaterally  Heart: RRR, normal S1 and S2, mild pitting edema  Abdomen: soft, non tender drain in pelvis.  Neuro: moving all extremities  Bardales catheter      Meds    aspirin  81 mg Enteral Tube DAILY     chlorhexidine  15 mL Topical Q12H     chlorothiazide (DIURIL) IVPB  500 mg Intravenous Q12H     insulin aspart (NovoLOG) injection   Subcutaneous Q4H FIXED TIMES     meropenem  1 g Intravenous Q12H     micafungin (MYCAMINE) IV  100 mg Intravenous Q24H     [Held by provider] omeprazole  20 mg Oral BID AC     omeprazole  20 mg Oral QAM AC    Or     omeprazole  20 mg Enteral Tube QAM AC    Or     pantoprazole  40 mg Intravenous QAM AC     [Held by provider] sertraline  100 mg Oral DAILY     sodium chloride bacteriostatic  1-5 mL Intradermal Once     sodium chloride  10 mL Intravenous Q8H FIXED TIMES     sodium chloride  10-30 mL Intravenous Q8H FIXED TIMES     [Held by  Status   08/19/2020 >2000 (H) 211 - 946 pg/mL Final       Folate   Date Value Ref Range Status   08/19/2020 >20.0 4.8 - 24.2 ng/mL Final         Lab Results   Component Value Date    COLORU Yellow 07/16/2020    NITRU POSITIVE 07/16/2020    GLUCOSEU Negative 07/16/2020    KETUA Negative 07/16/2020    UROBILINOGEN 0.2 07/16/2020    BILIRUBINUR Negative 07/16/2020       No results found for: VAIBHAV, CREURRAN, MACREATRATIO, OSMOU    No components found for: URIC    No results found for: LIPIDPAN      Assessment and Plan:    1. Acute kidney injury-  Likely pre-renal in the setting of poor po intake  Abdominal CT no hydro, atrophic kidneys  Will check urine studies  Continue IVF  Follow labs daily  Follow I&O, she has a garcia    2. Metabolic acidosis-  In the setting of LETHA  Started bicarb drip  Goal CO2 >22    3. Hyperuricemia-  Will hydrate and reevaluate before treating.      4. Hypomagnesemia-   Treat      TIKA Garcia - CNP     Patient examined , more alert comfortable   In no distress , eating her lunch     Garcia in place draining clear urine   She has started making urine , she appears much better   Will continue hydration -- add bicarbonate in fluids   Plan as outlined above     Dr Blue Esposito provider] ticagrelor  90 mg Enteral Tube BID       Pertinent Labs   Lab Results: personally reviewed.   not applicable    Results from last 7 days   Lab Units 08/29/20  0500 08/28/20  1715 08/28/20  0431  08/27/20  1207   LN-SODIUM mmol/L 137  --  138  --  139   LN-POTASSIUM mmol/L 3.6  3.6 3.7 3.5  3.5   < > 3.8   LN-CHLORIDE mmol/L 106  --  106  --  107   LN-CO2 mmol/L 22  --  20*  --  20*   LN-BLOOD UREA NITROGEN mg/dL 52*  --  48*  --  43*   LN-CREATININE mg/dL 3.08*  --  3.13*  --  2.92*   LN-CALCIUM mg/dL 8.6  --  8.4*  --  8.7    < > = values in this interval not displayed.         Results from last 7 days   Lab Units 08/28/20  0923 08/28/20  0431 08/27/20  0406   LN-WHITE BLOOD CELL COUNT thou/uL 14.1* 14.6* 18.3*   LN-HEMOGLOBIN g/dL 9.0* 8.9* 9.1*   LN-HEMATOCRIT % 27.1* 27.0* 28.3*   LN-PLATELET COUNT thou/uL 116* 115* 111*         Pertinent Radiology   Radiology Results: Personally reviewed impression/s    Ct Abdomen Pelvis Without Oral Without Iv Contrast    Result Date: 8/14/2020  EXAM: CT ABDOMEN PELVIS WO ORAL WO IV CONTRAST LOCATION: [facility] DATE/TIME: 8/14/2020 10:26 AM INDICATION: Abdominal infection suspected perforated diverticulitis, assess for abscess COMPARISON: 8/11/2020 TECHNIQUE: CT scan of the abdomen and pelvis was performed without oral or IV contrast. Multiplanar reformats were obtained. Dose reduction techniques were used. CONTRAST: None. FINDINGS: LOWER CHEST: Trace pleural fluid. Minimal scarring or edema at the lung bases. The heart is enlarged. There is coronary artery calcification. HEPATOBILIARY: Cholecystectomy. There is extrahepatic bile duct dilatation. PANCREAS: Pancreatic duct dilatation is not as well-seen on this study as on the comparison study. SPLEEN: Normal. ADRENAL GLANDS: Normal. KIDNEY/BLADDER: Normal kidneys and ureters. There is wall thickening of the nondistended urinary bladder. A Bardales catheter is present. BOWEL: There is colonic  diverticulosis. There is stranding adjacent to the sigmoid colon in the region of diverticulitis. There is a complex 4 cm x 3 cm collection to the left of the sigmoid colon in this region (previously this was 2 cm x 2 cm). Minimal  extraluminal air is noted. LYMPH NODES: Normal. VASCULATURE: Atherosclerotic disease. PELVIC ORGANS: Fibroid uterus. MUSCULOSKELETAL: Normal.     1.  Again seen is sigmoid colon diverticulitis with a small contained perforation. A small fluid collection adjacent to the sigmoid colon has increased in size to 4 cm x 3 cm (previously 2 cm x 2 cm). Percutaneous drainage would be difficult though may be possible. 2.  Biliary and pancreatic ductal dilatation is not as well-seen as on the comparison study. See prior study for details.    Xr Chest 1 View Portable    Result Date: 8/28/2020  EXAM: XR CHEST 1 VIEW PORTABLE LOCATION: [facility] DATE/TIME: 8/28/2020 3:39 PM INDICATION: Postintubation. COMPARISON: 08/27/2020.     ET tube tip at the level of the emily; recommend 1 to 2 cm retraction. New feeding tube coursing into the stomach and off the field-of-view. Stable right PICC. Similar to marginally improved coarse interstitial appearance and opacities bilaterally. No substantial pleural effusion. No pneumothorax. Stable enlarged cardiac silhouette. ET tube tip findings verbally communicated to patient's nurse, Lavern, at 3:45 PM on 08/28/2020. NOTE: ABNORMAL REPORT THE DICTATION ABOVE DESCRIBES AN ABNORMALITY FOR WHICH FOLLOW-UP IS NEEDED.     Xr Chest 1 View Portable    Result Date: 8/27/2020  EXAM: XR CHEST 1 VIEW PORTABLE LOCATION: [facility] DATE/TIME: 8/27/2020 5:53 AM INDICATION: Respiratory failure with progressive hypoxia. COMPARISON: 08/26/2020     Slight interval improvement in reticular interstitial infiltrates in alveolar infiltrates since prior study. There still remains extensive infiltrates throughout both lungs. Stable cardiac enlargement. Mild pulmonary  vascular congestion improved. Atherosclerotic vascular calcification of the aorta. Right PICC with tip in good position low SVC. Degenerative changes in the spine and shoulders    Xr Chest 1 View Portable    Result Date: 8/26/2020  EXAM: XR CHEST 1 VIEW PORTABLE LOCATION: [facility] DATE/TIME: 8/26/2020 4:56 PM INDICATION: sob COMPARISON: 08/23/2020     Right PICC tip projects over the mid SVC. Unchanged bilateral mixed reticular and alveolar opacities. These could represent cardiogenic or noncardiogenic pulmonary edema, pneumonia, and drug reaction. No pleural effusion.    Xr Chest 1 View Portable    Result Date: 8/21/2020  EXAM: XR CHEST 1 VIEW PORTABLE LOCATION: [facility] DATE/TIME: 8/21/2020 7:34 PM INDICATION: r/o pna- shortness of breath COMPARISON: 08/11/2020     New bilateral interstitial infiltrates could represent edema or pneumonia including COVID. Enlarged cardiac silhouette. No pleural effusion. No definite pulmonary vascular congestion.    Xr Chest 2 Views    Result Date: 8/11/2020  EXAM: XR CHEST 2 VIEWS LOCATION: [facility] DATE/TIME: 8/11/2020 11:50 AM INDICATION: Cough. COMPARISON: None.     Negative chest. Lungs are clear. Coronary stenting.     Xr Foot Right 2 Vws    Result Date: 8/16/2020  EXAM: XR FOOT RIGHT 2 VWS LOCATION: [facility] DATE/TIME: 8/16/2020 12:51 PM INDICATION: Acute right foot pain COMPARISON: None.     Bones are demineralized. No definitive evidence of an acute fracture. No cortical destructive changes to suggest osteomyelitis. Scattered degenerative changes.    Xr Chest 1 View For Picc Placement Portable    Result Date: 8/23/2020  EXAM: XR CHEST 1 VIEW FOR PICC LINE PLACEMENT PORTABLE LOCATION: [facility] DATE/TIME: 8/23/2020 6:28 PM INDICATION: verify catheter placement COMPARISON: 08/21/2020     Right upper extremity PICC line terminates approximately 3 cm above the expected cavoatrial junction. No change in the chest  otherwise. Extensive abnormal opacity throughout both lungs persists. Mild cardiomegaly. No pneumothorax or large pleural effusion.    Ct Abdomen Pelvis Without Oral With Iv Contrast    Result Date: 8/11/2020  EXAM: CT ABDOMEN PELVIS WO ORAL W IV CONTRAST LOCATION: [facility] DATE/TIME: 8/11/2020 11:41 AM INDICATION: right sided abdominal pain, nausea and vomiting COMPARISON: 7/22/2020 TECHNIQUE: CT scan of the abdomen and pelvis was performed following injection of IV contrast. Multiplanar reformats were obtained. Dose reduction techniques were used. CONTRAST: Iohexol (Omni) 75mL FINDINGS: LOWER CHEST: Minimal interstitial edema. The heart is mildly enlarged. There is coronary artery disease. HEPATOBILIARY: Cholecystectomy. There is intrahepatic and extrahepatic bile duct dilatation. There is pancreatic duct dilatation. PANCREAS: No discrete pancreatic masses identified. SPLEEN: Normal. ADRENAL GLANDS: Normal. KIDNEYS/BLADDER: Normal. BOWEL: There is colonic diverticulosis. There is acute diverticulosis involving the distal sigmoid colon with a small contained perforation. No drainable abscess at this point. LYMPH NODES: Normal. VASCULATURE: Atherosclerotic disease. Right renal artery stent. PELVIC ORGANS: Fibroid uterus. MUSCULOSKELETAL: Degenerative changes.     1.  Acute diverticulitis with a small contained perforation. No drainable abscess at this point. 2.  Biliary and pancreatic duct dilatation. No discrete pancreatic masses identified. Consider MRCP, ERCP, EUS.    Ct Abdomen Pelvis With Oral With Iv Contrast    Result Date: 8/20/2020  EXAM: CT ABDOMEN PELVIS W ORAL W IV CONTRAST LOCATION: [facility] DATE/TIME: 8/20/2020 3:43 PM INDICATION: Abdominal infection suspected ongoing pain and increasing leukocytosis COMPARISON: CT from 8/14/2020 TECHNIQUE: CT scan of the abdomen and pelvis was performed following injection of IV contrast. Multiplanar reformats were obtained. Dose reduction  "techniques were used. CONTRAST: Iohexol (Omni) 75mL FINDINGS: LOWER CHEST: Dependent atelectasis and interstitial edema within the lung bases without consolidation. Cardiomegaly and mitral annular calcifications. HEPATOBILIARY: Gallbladder surgically absent. Mild ectasia of the extrahepatic biliary tree. PANCREAS: Unchanged appearance. SPLEEN: Normal. ADRENAL GLANDS: Normal. KIDNEYS/BLADDER: Normal. BOWEL: Interval placement of a left anterior abdominal approach percutaneous drain into the perisigmoid abscess. The drain is well positioned with near complete collapse of the previously seen abscess collection. There is persistent mild adjacent stranding. The bowel is nonobstructed. No new intra-abdominal abscess identified. No pneumoperitoneum. LYMPH NODES: Normal. VASCULATURE: Atherosclerotic calcification throughout the arterial tree. No evidence of vascular complication following drain placement. PELVIC ORGANS: Fibroid uterus. No ascites. Bardales catheter is within a decompressed urinary bladder. Subcutaneous scattered calcifications in the subcutaneous flank regions compatible with prior injection sites. MUSCULOSKELETAL: No new or acute osseous findings. Degenerative changes at the hips and lumbar spine are stable.     1.  Interval percutaneous drain placement into the diverticular abscess with near complete collapse of the fluid cavity. Drain abuts the adjacent sigmoid colon. No evidence of post drain placement complication. 2.  No new intra-abdominal abscess or other findings to correlate with ongoing leukocytosis. 3.  Stable appearance of the pancreas with previously seen main pancreatic ductal dilatation not well visualized on this study.    Poc Us 3cg Picc Placement Guidance    Result Date: 8/23/2020  Exam was performed as guidance for PICC line insertion.  Click \"PACS images\" hyperlink below to view any stored images.  For specific procedure details, view procedure note authored by PICC/Vascular Access " "Nurse.    Poc Us 3cg Picc Placement Guidance    Result Date: 8/19/2020  Exam was performed as guidance for PICC line insertion.  Click \"PACS images\" hyperlink below to view any stored images.  For specific procedure details, view procedure note authored by PICC/Vascular Access Nurse.    Ct Abscess Drain Pelvic    Result Date: 8/17/2020  EXAM: 1. CT OF ABDOMEN AND PELVIS WITHOUT CONTRAST 2. PERCUTANEOUS DRAIN PLACEMENT LEFT PELVIS LOCATION:  DATE/TIME: 8/17/2020 9:59 AM INDICATION: diverticulitis TECHNIQUE: Dose reduction techniques were used. FINDINGS: The limited visualized portions of the lung bases are clear. Evaluation of the solid visceral organs is suboptimal given the lack of intravenous contrast. Within these limitations no focal hepatic lesion is seen. The patient is post cholecystectomy. There is no intra or extrahepatic biliary ductal dilatation. The stomach and duodenum are normal. The spleen size is normal. The kidneys enhance symmetrically and there is no renal collecting system dilatation. Note again is made of a diverticular abscess, small in size. This is unchanged from the prior examination. Fibroid uterus is noted. PROCEDURE: Informed consent obtained. Site marked. Prior images reviewed. Required items made available. Patient identity confirmed verbally and with arm band. Patient reevaluated immediately before administering sedation. Universal protocol was followed. Time out performed. The site was prepped and draped in sterile fashion. 10 mL of 1% lidocaine was infused into the local soft tissues. Using standard technique and under direct CT guidance, a 10 Wolof drainage catheter was inserted into the fluid collection.  SPECIMEN: 10 mL of purulent fluid was aspirated and sent to lab for cultures and Gram stain. BLOOD LOSS: Minimal. The patient tolerated the procedure well. No immediate complications. RADIOLOGIC SUPERVISION AND INTERPRETATION: CT GUIDANCE: Images demonstrate " the needle and subsequent catheter to be in good position. SEDATION: Versed 1 mg. Fentanyl 50 mcg. The procedure was performed with administration intravenous conscious sedation with appropriate preoperative, intraoperative, and postoperative evaluation. 15 minutes of supervised face to face conscious sedation time was provided by a radiology nurse under my direct supervision.     1. Stable diverticular abscess. 2. Successful CT-guided drain placement into left pelvic diverticular abscess. Reference CPT Codes: 82004, 64383    Ir Abscessogram Tube Check    Result Date: 8/25/2020  [place] [facility] LOCATION: [facility] DATE: 8/25/2020 PROCEDURE: ABSCESSOGRAM INTERVENTIONAL RADIOLOGIST: Altfa Bateman MD. INDICATION: Diverticular abscess with indwelling drain here for follow-up. FLUOROSCOPIC TIME: 0.8 minutes NUMBER OF IMAGES: 2 CONTRAST: 5 cc of Omni COMPLICATIONS: No immediate complications. PROCEDURE: Contrast injection through the existing drain demonstrates no residual abscess pocket. 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     Abscessogram reveals no residual abscess pocket. Fistula to the colon. Switched drain to gravity drainage bag from SHAMIKA suction bulb. No flushes are needed. Follow-up abscessogram in one week. CPT codes for physician use only: 08892/19225          Advanced Care Planning:  Discharge Planning discussed with patient  Anticipated LOS: multiple days  Barrier to discharge:aute issues  Disposition:TBD  Discussed care with patient for >35 minutes with greater than 50% of time spent in counseling and coordination of care.    MD Joseline  Hospitalist  [phone]    This note was dictated using voice recognition software. Any grammatical or context distortions are unintentional and inherent to the software.

## 2021-07-13 ENCOUNTER — CARE COORDINATION (OUTPATIENT)
Dept: CASE MANAGEMENT | Age: 86
End: 2021-07-13

## 2021-07-13 ENCOUNTER — CARE COORDINATION (OUTPATIENT)
Dept: CARE COORDINATION | Age: 86
End: 2021-07-13

## 2021-07-13 NOTE — CARE COORDINATION
[facility] Transitions Follow Up Call    2021    Patient: Ila Rodriguez  Patient : [id number]   MRN: <[id number]>  Reason for Admission:  Discharge Date: 21 RARS: Readmission Risk Score: 32    Esau spoke with NH and pt was d/c as she had . Care Transitions Subsequent and Final Call    Subsequent and Final Calls  Care Transitions Interventions  Other Interventions: Follow Up  No future appointments.     BELLE CarvalhoN, RN   [address] [place] [address] Transition Nurse  [phone]
